# Patient Record
Sex: MALE | Race: WHITE | HISPANIC OR LATINO | Employment: OTHER | URBAN - METROPOLITAN AREA
[De-identification: names, ages, dates, MRNs, and addresses within clinical notes are randomized per-mention and may not be internally consistent; named-entity substitution may affect disease eponyms.]

---

## 2021-09-10 ENCOUNTER — HOSPITAL ENCOUNTER (OUTPATIENT)
Dept: WOUND CARE | Facility: HOSPITAL | Age: 71
Discharge: HOME | End: 2021-09-10
Attending: PREVENTIVE MEDICINE
Payer: MEDICARE

## 2021-09-10 VITALS
HEART RATE: 72 BPM | SYSTOLIC BLOOD PRESSURE: 109 MMHG | DIASTOLIC BLOOD PRESSURE: 59 MMHG | WEIGHT: 165 LBS | TEMPERATURE: 98 F

## 2021-09-10 DIAGNOSIS — T87.89 NON-HEALING AMPUTATION SITE: Primary | ICD-10-CM

## 2021-09-10 DIAGNOSIS — L89.223 PRESSURE INJURY OF LEFT THIGH, STAGE 3: ICD-10-CM

## 2021-09-10 DIAGNOSIS — Z89.612 S/P AKA (ABOVE KNEE AMPUTATION), LEFT: ICD-10-CM

## 2021-09-10 DIAGNOSIS — T81.49XA INCISIONAL ABSCESS: ICD-10-CM

## 2021-09-10 DIAGNOSIS — Z86.73 H/O: STROKE: ICD-10-CM

## 2021-09-10 PROCEDURE — 99214 OFFICE O/P EST MOD 30 MIN: CPT

## 2021-09-17 ENCOUNTER — HOSPITAL ENCOUNTER (OUTPATIENT)
Dept: WOUND CARE | Facility: HOSPITAL | Age: 71
Discharge: HOME | End: 2021-09-17
Attending: PREVENTIVE MEDICINE
Payer: MEDICARE

## 2021-09-17 VITALS
WEIGHT: 165 LBS | SYSTOLIC BLOOD PRESSURE: 128 MMHG | TEMPERATURE: 98 F | DIASTOLIC BLOOD PRESSURE: 82 MMHG | HEART RATE: 80 BPM

## 2021-09-17 DIAGNOSIS — Z89.612 S/P AKA (ABOVE KNEE AMPUTATION), LEFT: ICD-10-CM

## 2021-09-17 DIAGNOSIS — T81.49XA INCISIONAL ABSCESS: Primary | ICD-10-CM

## 2021-09-17 DIAGNOSIS — L89.223 PRESSURE INJURY OF LEFT THIGH, STAGE 3: ICD-10-CM

## 2021-09-17 DIAGNOSIS — T87.89 NON-HEALING AMPUTATION SITE: ICD-10-CM

## 2021-09-17 PROCEDURE — 99214 OFFICE O/P EST MOD 30 MIN: CPT

## 2021-09-24 ENCOUNTER — HOSPITAL ENCOUNTER (OUTPATIENT)
Dept: WOUND CARE | Facility: HOSPITAL | Age: 71
Discharge: HOME | End: 2021-09-24
Attending: PREVENTIVE MEDICINE
Payer: MEDICARE

## 2021-09-24 VITALS
TEMPERATURE: 97 F | WEIGHT: 165 LBS | DIASTOLIC BLOOD PRESSURE: 59 MMHG | HEART RATE: 80 BPM | SYSTOLIC BLOOD PRESSURE: 124 MMHG

## 2021-09-24 DIAGNOSIS — L89.223 PRESSURE INJURY OF LEFT THIGH, STAGE 3: ICD-10-CM

## 2021-09-24 DIAGNOSIS — T81.49XA INCISIONAL ABSCESS: ICD-10-CM

## 2021-09-24 DIAGNOSIS — T87.89 NON-HEALING AMPUTATION SITE: Primary | ICD-10-CM

## 2021-09-24 PROCEDURE — 87075 CULTR BACTERIA EXCEPT BLOOD: CPT | Performed by: PREVENTIVE MEDICINE

## 2021-09-24 PROCEDURE — 87070 CULTURE OTHR SPECIMN AEROBIC: CPT | Performed by: PREVENTIVE MEDICINE

## 2021-09-24 PROCEDURE — 87077 CULTURE AEROBIC IDENTIFY: CPT | Performed by: PREVENTIVE MEDICINE

## 2021-09-24 PROCEDURE — 87186 SC STD MICRODIL/AGAR DIL: CPT | Mod: 59 | Performed by: PREVENTIVE MEDICINE

## 2021-09-24 PROCEDURE — 99214 OFFICE O/P EST MOD 30 MIN: CPT

## 2021-09-28 LAB
BACTERIA SPEC AEROBE CULT: ABNORMAL

## 2021-09-30 LAB — BACTERIA SPEC ANAEROBE CULT: NORMAL

## 2021-10-01 ENCOUNTER — HOSPITAL ENCOUNTER (OUTPATIENT)
Dept: WOUND CARE | Facility: HOSPITAL | Age: 71
Discharge: HOME | End: 2021-10-01
Attending: PREVENTIVE MEDICINE
Payer: MEDICARE

## 2021-10-01 VITALS — SYSTOLIC BLOOD PRESSURE: 134 MMHG | HEART RATE: 65 BPM | DIASTOLIC BLOOD PRESSURE: 65 MMHG | TEMPERATURE: 98 F

## 2021-10-01 DIAGNOSIS — T81.49XA INCISIONAL ABSCESS: ICD-10-CM

## 2021-10-01 DIAGNOSIS — Z86.73 H/O: STROKE: ICD-10-CM

## 2021-10-01 DIAGNOSIS — L89.223 PRESSURE INJURY OF LEFT THIGH, STAGE 3: ICD-10-CM

## 2021-10-01 DIAGNOSIS — T87.89 NON-HEALING AMPUTATION SITE: Primary | ICD-10-CM

## 2021-10-01 PROCEDURE — 99214 OFFICE O/P EST MOD 30 MIN: CPT

## 2021-10-01 RX ORDER — CEPHALEXIN 500 MG/1
500 CAPSULE ORAL EVERY 12 HOURS
Qty: 20 CAPSULE | Refills: 0 | Status: SHIPPED | OUTPATIENT
Start: 2021-10-01 | End: 2021-10-11

## 2021-10-05 ENCOUNTER — EXTERNAL HOME HEALTH (OUTPATIENT)
Dept: HOME HEALTH SERVICES | Facility: HOSPITAL | Age: 71
End: 2021-10-05

## 2021-10-08 ENCOUNTER — HOSPITAL ENCOUNTER (OUTPATIENT)
Dept: WOUND CARE | Facility: HOSPITAL | Age: 71
Discharge: HOME | End: 2021-10-08
Attending: PREVENTIVE MEDICINE
Payer: MEDICARE

## 2021-10-08 VITALS
DIASTOLIC BLOOD PRESSURE: 59 MMHG | WEIGHT: 165 LBS | TEMPERATURE: 97 F | HEART RATE: 65 BPM | SYSTOLIC BLOOD PRESSURE: 126 MMHG

## 2021-10-08 DIAGNOSIS — L89.223 PRESSURE INJURY OF LEFT THIGH, STAGE 3: ICD-10-CM

## 2021-10-08 DIAGNOSIS — T87.89 NON-HEALING AMPUTATION SITE: ICD-10-CM

## 2021-10-08 DIAGNOSIS — T81.49XA INCISIONAL ABSCESS: Primary | ICD-10-CM

## 2021-10-08 PROCEDURE — 99214 OFFICE O/P EST MOD 30 MIN: CPT

## 2021-10-15 ENCOUNTER — HOSPITAL ENCOUNTER (OUTPATIENT)
Dept: WOUND CARE | Facility: HOSPITAL | Age: 71
Discharge: HOME | End: 2021-10-15
Attending: PREVENTIVE MEDICINE
Payer: MEDICARE

## 2021-10-15 VITALS — DIASTOLIC BLOOD PRESSURE: 62 MMHG | TEMPERATURE: 97 F | HEART RATE: 65 BPM | SYSTOLIC BLOOD PRESSURE: 137 MMHG

## 2021-10-15 DIAGNOSIS — T81.49XA INCISIONAL ABSCESS: ICD-10-CM

## 2021-10-15 DIAGNOSIS — T87.89 NON-HEALING AMPUTATION SITE: Primary | ICD-10-CM

## 2021-10-15 PROCEDURE — 99214 OFFICE O/P EST MOD 30 MIN: CPT

## 2021-10-22 ENCOUNTER — HOSPITAL ENCOUNTER (OUTPATIENT)
Dept: WOUND CARE | Facility: HOSPITAL | Age: 71
Discharge: HOME | End: 2021-10-22
Attending: PREVENTIVE MEDICINE
Payer: MEDICARE

## 2021-10-22 VITALS
DIASTOLIC BLOOD PRESSURE: 52 MMHG | SYSTOLIC BLOOD PRESSURE: 114 MMHG | TEMPERATURE: 98 F | HEIGHT: 63 IN | BODY MASS INDEX: 29.23 KG/M2 | WEIGHT: 165 LBS | HEART RATE: 63 BPM

## 2021-10-22 DIAGNOSIS — Z89.612 S/P AKA (ABOVE KNEE AMPUTATION), LEFT: ICD-10-CM

## 2021-10-22 DIAGNOSIS — L89.223 PRESSURE INJURY OF LEFT THIGH, STAGE 3: ICD-10-CM

## 2021-10-22 DIAGNOSIS — T87.89 NON-HEALING AMPUTATION SITE: Primary | ICD-10-CM

## 2021-10-22 DIAGNOSIS — T81.49XA INCISIONAL ABSCESS: ICD-10-CM

## 2021-10-22 PROCEDURE — 99214 OFFICE O/P EST MOD 30 MIN: CPT

## 2021-10-29 ENCOUNTER — HOSPITAL ENCOUNTER (OUTPATIENT)
Dept: WOUND CARE | Facility: HOSPITAL | Age: 71
Discharge: HOME | End: 2021-10-29
Attending: PREVENTIVE MEDICINE
Payer: MEDICARE

## 2021-10-29 VITALS
HEIGHT: 63 IN | BODY MASS INDEX: 29.23 KG/M2 | DIASTOLIC BLOOD PRESSURE: 84 MMHG | WEIGHT: 165 LBS | SYSTOLIC BLOOD PRESSURE: 132 MMHG | HEART RATE: 91 BPM | TEMPERATURE: 98 F

## 2021-10-29 DIAGNOSIS — T81.49XA INCISIONAL ABSCESS: ICD-10-CM

## 2021-10-29 DIAGNOSIS — L89.223 PRESSURE INJURY OF LEFT THIGH, STAGE 3: ICD-10-CM

## 2021-10-29 DIAGNOSIS — T87.89 NON-HEALING AMPUTATION SITE: Primary | ICD-10-CM

## 2021-10-29 PROCEDURE — 99214 OFFICE O/P EST MOD 30 MIN: CPT

## 2021-11-05 ENCOUNTER — HOSPITAL ENCOUNTER (OUTPATIENT)
Dept: WOUND CARE | Facility: HOSPITAL | Age: 71
Discharge: HOME | End: 2021-11-05
Attending: PREVENTIVE MEDICINE
Payer: MEDICARE

## 2021-11-05 VITALS
BODY MASS INDEX: 29.23 KG/M2 | HEART RATE: 77 BPM | HEIGHT: 63 IN | SYSTOLIC BLOOD PRESSURE: 101 MMHG | WEIGHT: 165 LBS | DIASTOLIC BLOOD PRESSURE: 58 MMHG | TEMPERATURE: 98 F

## 2021-11-05 DIAGNOSIS — T87.89 NON-HEALING AMPUTATION SITE: Primary | ICD-10-CM

## 2021-11-05 DIAGNOSIS — T81.49XA INCISIONAL ABSCESS: ICD-10-CM

## 2021-11-05 DIAGNOSIS — Z89.612 S/P AKA (ABOVE KNEE AMPUTATION), LEFT: ICD-10-CM

## 2021-11-05 DIAGNOSIS — L89.223 PRESSURE INJURY OF LEFT THIGH, STAGE 3: ICD-10-CM

## 2021-11-05 PROCEDURE — 99214 OFFICE O/P EST MOD 30 MIN: CPT

## 2021-11-05 RX ORDER — MUPIROCIN 20 MG/G
OINTMENT TOPICAL DAILY
Qty: 30 G | Refills: 2 | Status: SHIPPED | OUTPATIENT
Start: 2021-11-05 | End: 2022-08-19 | Stop reason: ALTCHOICE

## 2021-11-05 RX ORDER — GABAPENTIN 100 MG/1
300 CAPSULE ORAL 3 TIMES DAILY
Qty: 270 CAPSULE | Refills: 3 | Status: SHIPPED | OUTPATIENT
Start: 2021-11-05 | End: 2022-03-05

## 2021-11-12 ENCOUNTER — HOSPITAL ENCOUNTER (OUTPATIENT)
Dept: WOUND CARE | Facility: HOSPITAL | Age: 71
Discharge: HOME | End: 2021-11-12
Attending: PREVENTIVE MEDICINE
Payer: MEDICARE

## 2021-11-12 VITALS
DIASTOLIC BLOOD PRESSURE: 63 MMHG | TEMPERATURE: 96 F | WEIGHT: 165 LBS | BODY MASS INDEX: 29.23 KG/M2 | SYSTOLIC BLOOD PRESSURE: 131 MMHG | HEART RATE: 65 BPM | HEIGHT: 63 IN

## 2021-11-12 DIAGNOSIS — Z89.612 S/P AKA (ABOVE KNEE AMPUTATION), LEFT: ICD-10-CM

## 2021-11-12 DIAGNOSIS — L89.223 PRESSURE INJURY OF LEFT THIGH, STAGE 3: ICD-10-CM

## 2021-11-12 DIAGNOSIS — T87.89 NON-HEALING AMPUTATION SITE: Primary | ICD-10-CM

## 2021-11-12 DIAGNOSIS — T81.49XA INCISIONAL ABSCESS: ICD-10-CM

## 2021-11-12 PROCEDURE — 99214 OFFICE O/P EST MOD 30 MIN: CPT

## 2021-11-19 ENCOUNTER — HOSPITAL ENCOUNTER (OUTPATIENT)
Dept: WOUND CARE | Facility: HOSPITAL | Age: 71
Discharge: HOME | End: 2021-11-19
Attending: PREVENTIVE MEDICINE
Payer: MEDICARE

## 2021-11-19 DIAGNOSIS — Z89.612 S/P AKA (ABOVE KNEE AMPUTATION), LEFT: ICD-10-CM

## 2021-11-19 DIAGNOSIS — L89.223 PRESSURE INJURY OF LEFT THIGH, STAGE 3: ICD-10-CM

## 2021-11-19 DIAGNOSIS — S31.109S OPEN WOUND OF ABDOMINAL WALL, SEQUELA: ICD-10-CM

## 2021-11-19 DIAGNOSIS — T87.89 NON-HEALING AMPUTATION SITE: Primary | ICD-10-CM

## 2021-11-19 PROCEDURE — 27201912 HC WOUND CARE DEBRIDEMENT SUPPLIES

## 2021-11-19 PROCEDURE — 11042 DBRDMT SUBQ TIS 1ST 20SQCM/<: CPT

## 2021-11-26 ENCOUNTER — HOSPITAL ENCOUNTER (OUTPATIENT)
Dept: WOUND CARE | Facility: HOSPITAL | Age: 71
Discharge: HOME | End: 2021-11-26
Attending: PREVENTIVE MEDICINE
Payer: MEDICARE

## 2021-11-26 VITALS
WEIGHT: 165 LBS | DIASTOLIC BLOOD PRESSURE: 78 MMHG | TEMPERATURE: 97 F | SYSTOLIC BLOOD PRESSURE: 130 MMHG | BODY MASS INDEX: 29.23 KG/M2 | HEIGHT: 63 IN | HEART RATE: 71 BPM

## 2021-11-26 DIAGNOSIS — T87.89 NON-HEALING AMPUTATION SITE: Primary | ICD-10-CM

## 2021-11-26 DIAGNOSIS — Z89.612 S/P AKA (ABOVE KNEE AMPUTATION), LEFT: ICD-10-CM

## 2021-11-26 DIAGNOSIS — L89.223 PRESSURE INJURY OF LEFT THIGH, STAGE 3: ICD-10-CM

## 2021-11-26 DIAGNOSIS — S31.109S OPEN WOUND OF ABDOMINAL WALL, SEQUELA: ICD-10-CM

## 2021-11-26 PROCEDURE — 27201912 HC WOUND CARE DEBRIDEMENT SUPPLIES

## 2021-11-26 PROCEDURE — 11042 DBRDMT SUBQ TIS 1ST 20SQCM/<: CPT

## 2021-12-03 ENCOUNTER — HOSPITAL ENCOUNTER (OUTPATIENT)
Dept: WOUND CARE | Facility: HOSPITAL | Age: 71
Discharge: HOME | End: 2021-12-03
Attending: PREVENTIVE MEDICINE
Payer: MEDICARE

## 2021-12-03 VITALS
SYSTOLIC BLOOD PRESSURE: 138 MMHG | DIASTOLIC BLOOD PRESSURE: 60 MMHG | BODY MASS INDEX: 29.23 KG/M2 | TEMPERATURE: 98 F | HEART RATE: 71 BPM | WEIGHT: 165 LBS | HEIGHT: 63 IN

## 2021-12-03 DIAGNOSIS — L92.9 HYPERGRANULATION: ICD-10-CM

## 2021-12-03 DIAGNOSIS — L89.223 PRESSURE INJURY OF LEFT THIGH, STAGE 3: ICD-10-CM

## 2021-12-03 DIAGNOSIS — T81.49XA INCISIONAL ABSCESS: Primary | ICD-10-CM

## 2021-12-03 DIAGNOSIS — Z89.612 S/P AKA (ABOVE KNEE AMPUTATION), LEFT: ICD-10-CM

## 2021-12-03 DIAGNOSIS — T87.89 NON-HEALING AMPUTATION SITE: ICD-10-CM

## 2021-12-03 PROCEDURE — 99214 OFFICE O/P EST MOD 30 MIN: CPT

## 2021-12-06 ENCOUNTER — EXTERNAL HOME HEALTH (OUTPATIENT)
Dept: HOME HEALTH SERVICES | Facility: HOSPITAL | Age: 71
End: 2021-12-06
Payer: MEDICARE

## 2021-12-10 ENCOUNTER — HOSPITAL ENCOUNTER (OUTPATIENT)
Dept: WOUND CARE | Facility: HOSPITAL | Age: 71
Discharge: HOME | End: 2021-12-10
Attending: PREVENTIVE MEDICINE
Payer: MEDICARE

## 2021-12-10 VITALS
SYSTOLIC BLOOD PRESSURE: 130 MMHG | DIASTOLIC BLOOD PRESSURE: 61 MMHG | HEIGHT: 63 IN | HEART RATE: 68 BPM | WEIGHT: 165 LBS | BODY MASS INDEX: 29.23 KG/M2

## 2021-12-10 DIAGNOSIS — S31.109S OPEN WOUND OF ABDOMINAL WALL, SEQUELA: ICD-10-CM

## 2021-12-10 DIAGNOSIS — T87.89 NON-HEALING AMPUTATION SITE: Primary | ICD-10-CM

## 2021-12-10 DIAGNOSIS — L89.223 PRESSURE INJURY OF LEFT THIGH, STAGE 3: ICD-10-CM

## 2021-12-10 PROCEDURE — 87070 CULTURE OTHR SPECIMN AEROBIC: CPT | Performed by: PREVENTIVE MEDICINE

## 2021-12-10 PROCEDURE — 11042 DBRDMT SUBQ TIS 1ST 20SQCM/<: CPT

## 2021-12-10 PROCEDURE — 27201912 HC WOUND CARE DEBRIDEMENT SUPPLIES

## 2021-12-10 PROCEDURE — 87077 CULTURE AEROBIC IDENTIFY: CPT | Mod: 59 | Performed by: PREVENTIVE MEDICINE

## 2021-12-10 PROCEDURE — 87075 CULTR BACTERIA EXCEPT BLOOD: CPT | Performed by: PREVENTIVE MEDICINE

## 2021-12-10 PROCEDURE — 87186 SC STD MICRODIL/AGAR DIL: CPT | Mod: 59 | Performed by: PREVENTIVE MEDICINE

## 2021-12-13 LAB
BACTERIA SPEC AEROBE CULT: ABNORMAL
BACTERIA SPEC AEROBE CULT: ABNORMAL

## 2021-12-15 LAB — BACTERIA SPEC ANAEROBE CULT: NORMAL

## 2021-12-17 ENCOUNTER — HOSPITAL ENCOUNTER (OUTPATIENT)
Dept: WOUND CARE | Facility: HOSPITAL | Age: 71
Discharge: HOME | End: 2021-12-17
Attending: PREVENTIVE MEDICINE
Payer: MEDICARE

## 2021-12-17 VITALS
TEMPERATURE: 97 F | HEART RATE: 63 BPM | HEIGHT: 63 IN | SYSTOLIC BLOOD PRESSURE: 137 MMHG | DIASTOLIC BLOOD PRESSURE: 84 MMHG | WEIGHT: 165 LBS | BODY MASS INDEX: 29.23 KG/M2

## 2021-12-17 DIAGNOSIS — L89.223 PRESSURE INJURY OF LEFT THIGH, STAGE 3: ICD-10-CM

## 2021-12-17 DIAGNOSIS — T87.89 NON-HEALING AMPUTATION SITE: Primary | ICD-10-CM

## 2021-12-17 DIAGNOSIS — S31.109S OPEN WOUND OF ABDOMINAL WALL, SEQUELA: ICD-10-CM

## 2021-12-17 PROCEDURE — 99214 OFFICE O/P EST MOD 30 MIN: CPT

## 2021-12-17 RX ORDER — LEVOFLOXACIN 500 MG/1
500 TABLET, FILM COATED ORAL DAILY
Qty: 10 TABLET | Refills: 0 | Status: SHIPPED | OUTPATIENT
Start: 2021-12-17 | End: 2021-12-17 | Stop reason: CLARIF

## 2021-12-31 ENCOUNTER — HOSPITAL ENCOUNTER (INPATIENT)
Facility: HOSPITAL | Age: 71
LOS: 5 days | Discharge: HOME HEALTH | DRG: 065 | End: 2022-01-05
Attending: PSYCHIATRY & NEUROLOGY | Admitting: PSYCHIATRY & NEUROLOGY
Payer: MEDICARE

## 2021-12-31 ENCOUNTER — HOSPITAL ENCOUNTER (EMERGENCY)
Facility: HOSPITAL | Age: 71
Discharge: ANOTHER HOSPITAL | End: 2021-12-31
Attending: EMERGENCY MEDICINE
Payer: MEDICARE

## 2021-12-31 VITALS
RESPIRATION RATE: 13 BRPM | SYSTOLIC BLOOD PRESSURE: 155 MMHG | HEART RATE: 78 BPM | DIASTOLIC BLOOD PRESSURE: 105 MMHG | TEMPERATURE: 99 F | BODY MASS INDEX: 29.23 KG/M2 | WEIGHT: 165 LBS | HEIGHT: 63 IN | OXYGEN SATURATION: 100 %

## 2021-12-31 DIAGNOSIS — I63.9 ISCHEMIC STROKE: ICD-10-CM

## 2021-12-31 DIAGNOSIS — S06.5XAA SUBDURAL HEMATOMA: Primary | ICD-10-CM

## 2021-12-31 DIAGNOSIS — G40.409 TONIC CLONIC SEIZURES: ICD-10-CM

## 2021-12-31 DIAGNOSIS — I62.03 ACUTE ON CHRONIC INTRACRANIAL SUBDURAL HEMATOMA: ICD-10-CM

## 2021-12-31 DIAGNOSIS — I62.01 ACUTE ON CHRONIC INTRACRANIAL SUBDURAL HEMATOMA: ICD-10-CM

## 2021-12-31 DIAGNOSIS — I69.30 HISTORY OF CVA WITH RESIDUAL DEFICIT: ICD-10-CM

## 2021-12-31 DIAGNOSIS — R56.9 SEIZURE: ICD-10-CM

## 2021-12-31 PROBLEM — E78.5 HYPERLIPIDEMIA: Status: ACTIVE | Noted: 2021-12-31

## 2021-12-31 PROBLEM — I10 ESSENTIAL HYPERTENSION: Status: ACTIVE | Noted: 2021-12-31

## 2021-12-31 PROBLEM — I50.9 CHF (CONGESTIVE HEART FAILURE): Status: ACTIVE | Noted: 2021-12-31

## 2021-12-31 PROBLEM — I73.9 PERIPHERAL VASCULAR DISEASE: Status: ACTIVE | Noted: 2021-12-31

## 2021-12-31 PROBLEM — R79.89 ELEVATED LACTIC ACID LEVEL: Status: ACTIVE | Noted: 2021-12-31

## 2021-12-31 PROBLEM — E03.9 HYPOTHYROIDISM: Status: ACTIVE | Noted: 2021-12-31

## 2021-12-31 PROBLEM — Z89.612 S/P AKA (ABOVE KNEE AMPUTATION) UNILATERAL, LEFT: Status: ACTIVE | Noted: 2021-12-31

## 2021-12-31 LAB
ABO + RH BLD: NORMAL
ALBUMIN SERPL BCP-MCNC: 3.7 G/DL (ref 3.5–5.2)
ALLENS TEST: ABNORMAL
ALP SERPL-CCNC: 91 U/L (ref 55–135)
ALT SERPL W/O P-5'-P-CCNC: 16 U/L (ref 10–44)
ANION GAP SERPL CALC-SCNC: 13 MMOL/L (ref 8–16)
APTT BLDCRRT: 29.3 SEC (ref 21–32)
AST SERPL-CCNC: 15 U/L (ref 10–40)
BACTERIA #/AREA URNS AUTO: NORMAL /HPF
BASOPHILS # BLD AUTO: 0.12 K/UL (ref 0–0.2)
BASOPHILS NFR BLD: 1 % (ref 0–1.9)
BILIRUB SERPL-MCNC: 0.2 MG/DL (ref 0.1–1)
BILIRUB UR QL STRIP: NEGATIVE
BLD GP AB SCN CELLS X3 SERPL QL: NORMAL
BUN SERPL-MCNC: 19 MG/DL (ref 8–23)
CALCIUM SERPL-MCNC: 9.8 MG/DL (ref 8.7–10.5)
CHLORIDE SERPL-SCNC: 102 MMOL/L (ref 95–110)
CHOLEST SERPL-MCNC: 173 MG/DL (ref 120–199)
CHOLEST/HDLC SERPL: 5.1 {RATIO} (ref 2–5)
CK SERPL-CCNC: 146 U/L (ref 20–200)
CLARITY UR REFRACT.AUTO: CLEAR
CO2 SERPL-SCNC: 24 MMOL/L (ref 23–29)
COLOR UR AUTO: YELLOW
CREAT SERPL-MCNC: 1.6 MG/DL (ref 0.5–1.4)
CTP QC/QA: YES
DELSYS: ABNORMAL
DIFFERENTIAL METHOD: ABNORMAL
DIGOXIN SERPL-MCNC: 0.6 NG/ML (ref 0.8–2)
EOSINOPHIL # BLD AUTO: 0.4 K/UL (ref 0–0.5)
EOSINOPHIL NFR BLD: 3.3 % (ref 0–8)
ERYTHROCYTE [DISTWIDTH] IN BLOOD BY AUTOMATED COUNT: 16 % (ref 11.5–14.5)
EST. GFR  (AFRICAN AMERICAN): 50 ML/MIN/1.73 M^2
EST. GFR  (NON AFRICAN AMERICAN): 43 ML/MIN/1.73 M^2
ESTIMATED AVG GLUCOSE: 157 MG/DL (ref 68–131)
GLUCOSE SERPL-MCNC: 186 MG/DL (ref 70–110)
GLUCOSE UR QL STRIP: NEGATIVE
HBA1C MFR BLD: 7.1 % (ref 4–5.6)
HCO3 UR-SCNC: 29.4 MMOL/L (ref 24–28)
HCT VFR BLD AUTO: 35.9 % (ref 40–54)
HCT VFR BLD CALC: 34 %PCV (ref 36–54)
HDLC SERPL-MCNC: 34 MG/DL (ref 40–75)
HDLC SERPL: 19.7 % (ref 20–50)
HGB BLD-MCNC: 11.2 G/DL (ref 14–18)
HGB UR QL STRIP: NEGATIVE
HYALINE CASTS UR QL AUTO: 0 /LPF
IMM GRANULOCYTES # BLD AUTO: 0.13 K/UL (ref 0–0.04)
IMM GRANULOCYTES NFR BLD AUTO: 1.1 % (ref 0–0.5)
INFLUENZA A, MOLECULAR: NEGATIVE
INFLUENZA B, MOLECULAR: NEGATIVE
INR PPP: 1.1 (ref 0.8–1.2)
KETONES UR QL STRIP: NEGATIVE
LACTATE SERPL-SCNC: 0.9 MMOL/L (ref 0.5–2.2)
LACTATE SERPL-SCNC: 4.2 MMOL/L (ref 0.5–2.2)
LDLC SERPL CALC-MCNC: 96 MG/DL (ref 63–159)
LEUKOCYTE ESTERASE UR QL STRIP: NEGATIVE
LYMPHOCYTES # BLD AUTO: 2.1 K/UL (ref 1–4.8)
LYMPHOCYTES NFR BLD: 17.1 % (ref 18–48)
MCH RBC QN AUTO: 30.4 PG (ref 27–31)
MCHC RBC AUTO-ENTMCNC: 31.2 G/DL (ref 32–36)
MCV RBC AUTO: 98 FL (ref 82–98)
MICROSCOPIC COMMENT: NORMAL
MONOCYTES # BLD AUTO: 0.8 K/UL (ref 0.3–1)
MONOCYTES NFR BLD: 6.3 % (ref 4–15)
NEUTROPHILS # BLD AUTO: 8.7 K/UL (ref 1.8–7.7)
NEUTROPHILS NFR BLD: 71.2 % (ref 38–73)
NITRITE UR QL STRIP: NEGATIVE
NONHDLC SERPL-MCNC: 139 MG/DL
NRBC BLD-RTO: 0 /100 WBC
PCO2 BLDA: 42.9 MMHG (ref 35–45)
PH SMN: 7.44 [PH] (ref 7.35–7.45)
PH UR STRIP: 5 [PH] (ref 5–8)
PLATELET # BLD AUTO: 338 K/UL (ref 150–450)
PMV BLD AUTO: 9.2 FL (ref 9.2–12.9)
PO2 BLDA: 69 MMHG (ref 80–100)
POC BE: 5 MMOL/L
POC IONIZED CALCIUM: 1.28 MMOL/L (ref 1.06–1.42)
POC SATURATED O2: 94 % (ref 95–100)
POC TCO2: 31 MMOL/L (ref 23–27)
POCT GLUCOSE: 168 MG/DL (ref 70–110)
POCT GLUCOSE: 87 MG/DL (ref 70–110)
POTASSIUM BLD-SCNC: 3.8 MMOL/L (ref 3.5–5.1)
POTASSIUM SERPL-SCNC: 4.8 MMOL/L (ref 3.5–5.1)
PROCALCITONIN SERPL IA-MCNC: 0.02 NG/ML
PROT SERPL-MCNC: 7.6 G/DL (ref 6–8.4)
PROT UR QL STRIP: ABNORMAL
PROTHROMBIN TIME: 11.1 SEC (ref 9–12.5)
RBC # BLD AUTO: 3.68 M/UL (ref 4.6–6.2)
RBC #/AREA URNS AUTO: 1 /HPF (ref 0–4)
SAMPLE: ABNORMAL
SARS-COV-2 RDRP RESP QL NAA+PROBE: NEGATIVE
SITE: ABNORMAL
SODIUM BLD-SCNC: 141 MMOL/L (ref 136–145)
SODIUM SERPL-SCNC: 139 MMOL/L (ref 136–145)
SP GR UR STRIP: 1.02 (ref 1–1.03)
SPECIMEN SOURCE: NORMAL
SQUAMOUS #/AREA URNS AUTO: 3 /HPF
T4 FREE SERPL-MCNC: 0.48 NG/DL (ref 0.71–1.51)
TRIGL SERPL-MCNC: 215 MG/DL (ref 30–150)
TROPONIN I SERPL DL<=0.01 NG/ML-MCNC: <0.006 NG/ML (ref 0–0.03)
TSH SERPL DL<=0.005 MIU/L-ACNC: 54.59 UIU/ML (ref 0.4–4)
URN SPEC COLLECT METH UR: ABNORMAL
WBC # BLD AUTO: 12.22 K/UL (ref 3.9–12.7)
WBC #/AREA URNS AUTO: 3 /HPF (ref 0–5)

## 2021-12-31 PROCEDURE — 84484 ASSAY OF TROPONIN QUANT: CPT | Performed by: EMERGENCY MEDICINE

## 2021-12-31 PROCEDURE — 83036 HEMOGLOBIN GLYCOSYLATED A1C: CPT

## 2021-12-31 PROCEDURE — 83605 ASSAY OF LACTIC ACID: CPT | Mod: 91 | Performed by: PSYCHIATRY & NEUROLOGY

## 2021-12-31 PROCEDURE — U0002 COVID-19 LAB TEST NON-CDC: HCPCS | Performed by: EMERGENCY MEDICINE

## 2021-12-31 PROCEDURE — 20000000 HC ICU ROOM

## 2021-12-31 PROCEDURE — 63600175 PHARM REV CODE 636 W HCPCS

## 2021-12-31 PROCEDURE — 80053 COMPREHEN METABOLIC PANEL: CPT | Performed by: EMERGENCY MEDICINE

## 2021-12-31 PROCEDURE — 85025 COMPLETE CBC W/AUTO DIFF WBC: CPT | Performed by: EMERGENCY MEDICINE

## 2021-12-31 PROCEDURE — 27000221 HC OXYGEN, UP TO 24 HOURS

## 2021-12-31 PROCEDURE — 80162 ASSAY OF DIGOXIN TOTAL: CPT

## 2021-12-31 PROCEDURE — 82962 GLUCOSE BLOOD TEST: CPT

## 2021-12-31 PROCEDURE — 87040 BLOOD CULTURE FOR BACTERIA: CPT | Performed by: EMERGENCY MEDICINE

## 2021-12-31 PROCEDURE — 96365 THER/PROPH/DIAG IV INF INIT: CPT

## 2021-12-31 PROCEDURE — 99291 CRITICAL CARE FIRST HOUR: CPT | Mod: 25

## 2021-12-31 PROCEDURE — 83605 ASSAY OF LACTIC ACID: CPT | Performed by: EMERGENCY MEDICINE

## 2021-12-31 PROCEDURE — 84439 ASSAY OF FREE THYROXINE: CPT

## 2021-12-31 PROCEDURE — 63600175 PHARM REV CODE 636 W HCPCS: Performed by: EMERGENCY MEDICINE

## 2021-12-31 PROCEDURE — 84443 ASSAY THYROID STIM HORMONE: CPT

## 2021-12-31 PROCEDURE — 85730 THROMBOPLASTIN TIME PARTIAL: CPT | Performed by: EMERGENCY MEDICINE

## 2021-12-31 PROCEDURE — 94761 N-INVAS EAR/PLS OXIMETRY MLT: CPT

## 2021-12-31 PROCEDURE — 95720 PR EEG, W/VIDEO, CONT RECORD, I&R, >12<26 HRS: ICD-10-PCS | Mod: ,,, | Performed by: PSYCHIATRY & NEUROLOGY

## 2021-12-31 PROCEDURE — 99291 CRITICAL CARE FIRST HOUR: CPT | Mod: ,,,

## 2021-12-31 PROCEDURE — 25000003 PHARM REV CODE 250

## 2021-12-31 PROCEDURE — 36415 COLL VENOUS BLD VENIPUNCTURE: CPT

## 2021-12-31 PROCEDURE — 87502 INFLUENZA DNA AMP PROBE: CPT | Performed by: EMERGENCY MEDICINE

## 2021-12-31 PROCEDURE — 84145 PROCALCITONIN (PCT): CPT | Performed by: EMERGENCY MEDICINE

## 2021-12-31 PROCEDURE — 80061 LIPID PANEL: CPT

## 2021-12-31 PROCEDURE — 82550 ASSAY OF CK (CPK): CPT | Performed by: EMERGENCY MEDICINE

## 2021-12-31 PROCEDURE — 81001 URINALYSIS AUTO W/SCOPE: CPT

## 2021-12-31 PROCEDURE — 85610 PROTHROMBIN TIME: CPT | Performed by: EMERGENCY MEDICINE

## 2021-12-31 PROCEDURE — 86901 BLOOD TYPING SEROLOGIC RH(D): CPT

## 2021-12-31 PROCEDURE — 25000003 PHARM REV CODE 250: Performed by: EMERGENCY MEDICINE

## 2021-12-31 PROCEDURE — 95720 EEG PHY/QHP EA INCR W/VEEG: CPT | Mod: ,,, | Performed by: PSYCHIATRY & NEUROLOGY

## 2021-12-31 PROCEDURE — 99291 PR CRITICAL CARE, E/M 30-74 MINUTES: ICD-10-PCS | Mod: ,,,

## 2021-12-31 PROCEDURE — 99900035 HC TECH TIME PER 15 MIN (STAT)

## 2021-12-31 RX ORDER — GABAPENTIN 250 MG/5ML
300 SOLUTION ORAL EVERY 8 HOURS
Status: DISCONTINUED | OUTPATIENT
Start: 2021-12-31 | End: 2021-12-31

## 2021-12-31 RX ORDER — CARVEDILOL 12.5 MG/1
12.5 TABLET ORAL 2 TIMES DAILY
Status: DISCONTINUED | OUTPATIENT
Start: 2021-12-31 | End: 2022-01-02

## 2021-12-31 RX ORDER — OLANZAPINE 2.5 MG/1
2.5 TABLET ORAL DAILY
Status: DISCONTINUED | OUTPATIENT
Start: 2022-01-01 | End: 2022-01-02

## 2021-12-31 RX ORDER — IBUPROFEN 100 MG/5ML
1000 SUSPENSION, ORAL (FINAL DOSE FORM) ORAL DAILY
COMMUNITY

## 2021-12-31 RX ORDER — POLYETHYLENE GLYCOL 3350 17 G/17G
17 POWDER, FOR SOLUTION ORAL DAILY
Status: DISCONTINUED | OUTPATIENT
Start: 2022-01-01 | End: 2022-01-02

## 2021-12-31 RX ORDER — AMOXICILLIN 250 MG
1 CAPSULE ORAL DAILY
Status: DISCONTINUED | OUTPATIENT
Start: 2021-12-31 | End: 2021-12-31

## 2021-12-31 RX ORDER — SODIUM CHLORIDE 0.9 % (FLUSH) 0.9 %
10 SYRINGE (ML) INJECTION
Status: DISCONTINUED | OUTPATIENT
Start: 2021-12-31 | End: 2022-01-05 | Stop reason: HOSPADM

## 2021-12-31 RX ORDER — LEVOTHYROXINE SODIUM 25 UG/1
25 TABLET ORAL
Status: DISCONTINUED | OUTPATIENT
Start: 2022-01-01 | End: 2021-12-31

## 2021-12-31 RX ORDER — LEVOTHYROXINE SODIUM 25 UG/1
25 TABLET ORAL
Status: DISCONTINUED | OUTPATIENT
Start: 2022-01-01 | End: 2022-01-02

## 2021-12-31 RX ORDER — CARVEDILOL 12.5 MG/1
12.5 TABLET ORAL 2 TIMES DAILY
Status: DISCONTINUED | OUTPATIENT
Start: 2021-12-31 | End: 2021-12-31

## 2021-12-31 RX ORDER — ACETAMINOPHEN 500 MG
1000 TABLET ORAL 2 TIMES DAILY PRN
COMMUNITY

## 2021-12-31 RX ORDER — UBIDECARENONE 75 MG
500 CAPSULE ORAL DAILY
COMMUNITY

## 2021-12-31 RX ORDER — LABETALOL HCL 20 MG/4 ML
10 SYRINGE (ML) INTRAVENOUS EVERY 6 HOURS PRN
Status: DISCONTINUED | OUTPATIENT
Start: 2021-12-31 | End: 2022-01-05 | Stop reason: HOSPADM

## 2021-12-31 RX ORDER — OLANZAPINE 2.5 MG/1
2.5 TABLET ORAL DAILY
COMMUNITY
Start: 2021-09-17

## 2021-12-31 RX ORDER — AMOXICILLIN 250 MG
1 CAPSULE ORAL DAILY PRN
Status: DISCONTINUED | OUTPATIENT
Start: 2021-12-31 | End: 2021-12-31

## 2021-12-31 RX ORDER — ATORVASTATIN CALCIUM 20 MG/1
20 TABLET, FILM COATED ORAL NIGHTLY
COMMUNITY
Start: 2021-09-17

## 2021-12-31 RX ORDER — ATORVASTATIN CALCIUM 20 MG/1
20 TABLET, FILM COATED ORAL DAILY
Status: DISCONTINUED | OUTPATIENT
Start: 2021-12-31 | End: 2021-12-31

## 2021-12-31 RX ORDER — OLANZAPINE 2.5 MG/1
2.5 TABLET ORAL DAILY
Status: DISCONTINUED | OUTPATIENT
Start: 2021-12-31 | End: 2021-12-31

## 2021-12-31 RX ORDER — CARVEDILOL 12.5 MG/1
12.5 TABLET ORAL 2 TIMES DAILY
Status: ON HOLD | COMMUNITY
Start: 2021-09-17 | End: 2022-01-05 | Stop reason: HOSPADM

## 2021-12-31 RX ORDER — DIGOXIN 125 MCG
0.12 TABLET ORAL DAILY
Status: DISCONTINUED | OUTPATIENT
Start: 2021-12-31 | End: 2021-12-31

## 2021-12-31 RX ORDER — ACETAMINOPHEN 500 MG
5000 TABLET ORAL WEEKLY
COMMUNITY

## 2021-12-31 RX ORDER — DULOXETIN HYDROCHLORIDE 30 MG/1
60 CAPSULE, DELAYED RELEASE ORAL 2 TIMES DAILY
Status: DISCONTINUED | OUTPATIENT
Start: 2021-12-31 | End: 2022-01-05 | Stop reason: HOSPADM

## 2021-12-31 RX ORDER — DIGOXIN 125 MCG
0.12 TABLET ORAL DAILY
Status: DISCONTINUED | OUTPATIENT
Start: 2022-01-01 | End: 2022-01-02

## 2021-12-31 RX ORDER — BISACODYL 10 MG
10 SUPPOSITORY, RECTAL RECTAL ONCE
Status: COMPLETED | OUTPATIENT
Start: 2021-12-31 | End: 2021-12-31

## 2021-12-31 RX ORDER — SILODOSIN 4 MG/1
4 CAPSULE ORAL DAILY
Status: DISCONTINUED | OUTPATIENT
Start: 2021-12-31 | End: 2021-12-31

## 2021-12-31 RX ORDER — DULOXETIN HYDROCHLORIDE 60 MG/1
60 CAPSULE, DELAYED RELEASE ORAL 2 TIMES DAILY
COMMUNITY
Start: 2021-09-17

## 2021-12-31 RX ORDER — LEVOTHYROXINE SODIUM 25 UG/1
25 TABLET ORAL DAILY
COMMUNITY
Start: 2021-09-17

## 2021-12-31 RX ORDER — ATORVASTATIN CALCIUM 20 MG/1
20 TABLET, FILM COATED ORAL DAILY
Status: DISCONTINUED | OUTPATIENT
Start: 2022-01-01 | End: 2022-01-02

## 2021-12-31 RX ORDER — INSULIN ASPART 100 [IU]/ML
1-10 INJECTION, SOLUTION INTRAVENOUS; SUBCUTANEOUS EVERY 6 HOURS PRN
Status: DISCONTINUED | OUTPATIENT
Start: 2021-12-31 | End: 2022-01-03

## 2021-12-31 RX ORDER — DIGOXIN 125 MCG
0.12 TABLET ORAL DAILY
COMMUNITY
Start: 2021-09-02

## 2021-12-31 RX ORDER — SILODOSIN 4 MG/1
4 CAPSULE ORAL DAILY
Status: DISCONTINUED | OUTPATIENT
Start: 2022-01-01 | End: 2022-01-02

## 2021-12-31 RX ORDER — HYDRALAZINE HYDROCHLORIDE 20 MG/ML
10 INJECTION INTRAMUSCULAR; INTRAVENOUS EVERY 6 HOURS PRN
Status: DISCONTINUED | OUTPATIENT
Start: 2021-12-31 | End: 2022-01-05 | Stop reason: HOSPADM

## 2021-12-31 RX ORDER — POLYETHYLENE GLYCOL 3350 17 G/17G
17 POWDER, FOR SOLUTION ORAL DAILY
Status: DISCONTINUED | OUTPATIENT
Start: 2022-01-01 | End: 2021-12-31

## 2021-12-31 RX ORDER — TAMSULOSIN HYDROCHLORIDE 0.4 MG/1
1 CAPSULE ORAL NIGHTLY
COMMUNITY
Start: 2021-09-17

## 2021-12-31 RX ORDER — LEVETIRACETAM 500 MG/5ML
1000 INJECTION, SOLUTION, CONCENTRATE INTRAVENOUS EVERY 12 HOURS
Status: DISCONTINUED | OUTPATIENT
Start: 2021-12-31 | End: 2022-01-01

## 2021-12-31 RX ORDER — AMOXICILLIN 250 MG
1 CAPSULE ORAL DAILY
Status: DISCONTINUED | OUTPATIENT
Start: 2022-01-01 | End: 2022-01-01

## 2021-12-31 RX ORDER — BISACODYL 5 MG
5 TABLET, DELAYED RELEASE (ENTERIC COATED) ORAL DAILY PRN
COMMUNITY

## 2021-12-31 RX ORDER — SODIUM CHLORIDE 9 MG/ML
INJECTION, SOLUTION INTRAVENOUS CONTINUOUS
Status: DISCONTINUED | OUTPATIENT
Start: 2021-12-31 | End: 2022-01-01

## 2021-12-31 RX ORDER — LABETALOL HCL 20 MG/4 ML
10 SYRINGE (ML) INTRAVENOUS EVERY 6 HOURS PRN
Status: DISCONTINUED | OUTPATIENT
Start: 2021-12-31 | End: 2021-12-31

## 2021-12-31 RX ORDER — GABAPENTIN 250 MG/5ML
300 SOLUTION ORAL EVERY 8 HOURS
Status: DISCONTINUED | OUTPATIENT
Start: 2021-12-31 | End: 2022-01-02

## 2021-12-31 RX ORDER — GLUCAGON 1 MG
1 KIT INJECTION
Status: DISCONTINUED | OUTPATIENT
Start: 2021-12-31 | End: 2022-01-05 | Stop reason: HOSPADM

## 2021-12-31 RX ADMIN — SODIUM CHLORIDE: 0.9 INJECTION, SOLUTION INTRAVENOUS at 04:12

## 2021-12-31 RX ADMIN — HYDRALAZINE HYDROCHLORIDE 10 MG: 20 INJECTION INTRAMUSCULAR; INTRAVENOUS at 05:12

## 2021-12-31 RX ADMIN — CARVEDILOL 12.5 MG: 12.5 TABLET, FILM COATED ORAL at 09:12

## 2021-12-31 RX ADMIN — LEVETIRACETAM 1000 MG: 500 INJECTION, SOLUTION INTRAVENOUS at 09:12

## 2021-12-31 RX ADMIN — BISACODYL 10 MG: 10 SUPPOSITORY RECTAL at 06:12

## 2021-12-31 RX ADMIN — GABAPENTIN 300 MG: 250 SOLUTION ORAL at 09:12

## 2021-12-31 RX ADMIN — LEVETIRACETAM 3000 MG: 100 INJECTION, SOLUTION INTRAVENOUS at 10:12

## 2021-12-31 RX ADMIN — DULOXETINE 60 MG: 60 CAPSULE, DELAYED RELEASE ORAL at 09:12

## 2021-12-31 NOTE — H&P
[facility]  Neurocritical Care  History & Physical    Admit Date: 12/31/2021  Service Date: 12/31/2021  Length of Stay: 0    Subjective:     Chief Complaint: Tonic clonic seizures    History of Present Illness: Mr. Blair is a 70 yo M with PMHx of PVD, CHF, and recent L AKA who presents to [facility] for new onset seizures and an AoC SDH. He originally presented to to the ED at [facility] via EMS with the complaint of seizure. Per his wife, the patient was in his normal state health yesterday and this morning.  She heard the bed shaking and found her  kicking his right leg and shaking his right arm in a manner most consistent with a tonic-clonic seizure approximately 1 hour prior to arrival.  Patient's wife states that she called EMS and upon EMS arrival patient was awake but postictal.  He was then being transported to the ambulance where he reportedly had another seizure and was given 2.5 mg of Versed.  Per chart review, the patient's wife states he is currently receiving antibiotics via a PICC for infected left AKA. She also denied any history of seizures or recent illness. Of note, the patient had a stroke approximately 10 years ago  for which he has residual deficit on the left side of his body and takes daily ASA. He was loaded with 3 g keppra prior to transfer to [facility].    He is being admitted to [facility] for hourly neuromonitoring and a higher level of care.       Past Medical History:   Diagnosis Date    Congestive heart failure     Peripheral vascular disease     S/P AKA (above knee amputation)      Past Surgical History:   Procedure Laterality Date    AMPUTATION      HIP SURGERY        Current Facility-Administered Medications on File Prior to Encounter   Medication Dose Route Frequency Provider Last Rate Last Admin    [COMPLETED] levETIRAcetam (KEPPRA) 3,000 mg in dextrose 5 % 250 mL IVPB  3,000 mg Intravenous ED 1 Time Pa Zelaya MD   Stopped at 12/31/21 3432     Current Outpatient  Medications on File Prior to Encounter   Medication Sig Dispense Refill    acetaminophen (TYLENOL) 500 MG tablet Take 1,000 mg by mouth 2 (two) times daily as needed for Pain.      ascorbic acid, vitamin C, (VITAMIN C) 1000 MG tablet Take 1,000 mg by mouth once daily.      atorvastatin (LIPITOR) 20 MG tablet Take 20 mg by mouth nightly.      bisacodyL (DULCOLAX) 5 mg EC tablet Take 5 mg by mouth daily as needed for Constipation.      carvediloL (COREG) 12.5 MG tablet Take 12.5 mg by mouth 2 (two) times daily.      cholecalciferol, vitamin D3, 125 mcg (5,000 unit) Tab Take 5,000 Units by mouth once a week.      cyanocobalamin 500 MCG tablet Take 500 mcg by mouth once daily.      digoxin (LANOXIN) 125 mcg tablet Take 0.125 mg by mouth once daily.      DULoxetine (CYMBALTA) 60 MG capsule Take 60 mg by mouth 2 (two) times daily.      gabapentin (NEURONTIN) 100 MG capsule Take 3 capsules (300 mg total) by mouth 3 (three) times daily. 270 capsule 3    levothyroxine (SYNTHROID) 25 MCG tablet Take 25 mcg by mouth once daily.      mupirocin (BACTROBAN) 2 % ointment Apply topically once daily. (Patient not taking: Reported on 12/31/2021) 30 g 2    OLANZapine (ZYPREXA) 2.5 MG tablet Take 2.5 mg by mouth once daily.      omega-3 fatty acids 500 mg Cap Take 1 capsule by mouth once daily.      sodium chloride 0.9 % PgBk 100 mL with ertapenem 1 gram SolR 1 g Inject 1 g into the vein.      tamsulosin (FLOMAX) 0.4 mg Cap Take 1 capsule by mouth nightly.        Allergies: Patient has no known allergies.    Family History   Problem Relation Age of Onset    Heart disease Father      Social History     Tobacco Use    Smoking status: Current Every Day Smoker     Packs/day: 0.25    Smokeless tobacco: Current User   Substance Use Topics    Alcohol use: Yes     Comment: occassionally    Drug use: Never     Review of Systems   Unable to perform ROS: Acuity of condition     Objective:     Vitals:    Temp: 97.8 °F (36.6  °C)  Pulse: 63  BP: 117/64  Resp: 15  SpO2: 97 %    Temp  Min: 97.8 °F (36.6 °C)  Max: 99 °F (37.2 °C)  Pulse  Min: 63  Max: 102  BP  Min: 110/68  Max: 155/105  MAP (mmHg)  Min: 86  Max: 121  Resp  Min: 10  Max: 16  SpO2  Min: 97 %  Max: 100 %    No intake/output data recorded.           Physical Exam  Constitutional: Well-nourished, well-developed. No obvious distress.  Eyes: Clear conjunctiva. Anicteric. No discharge. Lids without lesions.  HEENT: MMM. Nose, external ears atraumatic.  Cardio: RRR. Pulses intact. Capillary refill time < 2 seconds.  Respiratory: Clear to auscultation. Regular effort.  GI: Bowel sounds present. Soft, non-tender. + Distention.  Extremities: L AKA with wound present    Neurologic:  [id number]  Follows some simple commands  PERRL, EOMI  Moves R side spontaneously and AG  LUE no response to painful stimuli (baseline 2/2 to previous stroke)     Today I personally reviewed pertinent medications, lines/drains/airways, imaging, cardiology results, laboratory results, microbiology results, notably:        Assessment/Plan:     Neuro  * Tonic clonic seizures  72 y/o M with new onset tonic clonic seizure-like activity of the R side witnessed by both his wife and EMS. Given 2.5 versed en route to [facility] and loaded with 3 g keppra.   -cEEG  -No prior history of seizures   -Keppra 1 g BID         Acute on chronic intracranial subdural hematoma  -Admit to [facility], NSGY following  -q1h neuro checks, vital checks  -EKG, ECHO, CXR  -A1c, TSH, lipid panel, coag panel  -Daily CBC, CMP, mag, phos  -SBP < 160, prn labetalol and hydralazine  -SCDs, hold DVT chemoppx in setting of acute bleed  -Hold ASA  -CT 8 mm AoC SDH along the R posterior cerebral convexity with localized mass effect but no significant MLS  -Keppra 1 g bid   -PT/OT/SLP  -Repeat CT pending    History of CVA with residual deficit  CTH with multifocal large remote appearing infarcts, R>L with generalized cerebral volume loss and chronic ischemic  changes  Statin  Hold ASA in setting of AoC SDH  q1h neuro checks  PT/OT/SLP  Repeat CTH pending      Cardiac/Vascular  Peripheral vascular disease  statin    Hyperlipidemia  Resume home statin  Lipid panel pending      Essential hypertension  SBP < 160  Resume home carvedilol      CHF (congestive heart failure)  -History of CHF per chart review, no ECHO available  -Repeat ECHO, CXR pending  -resume home digoxin, digoxin level pending    Endocrine  Hypothyroidism  TSH pending  Resume home synthroid    Orthopedic  S/P AKA (above knee amputation) unilateral, left  History of L AKA with poor wound healing  -wound care consult  -currently afebrile with WBC WNL, bcx from [facility] pending    Other  Elevated lactic acid level  -Present on admission, repeat pending  -Procal negative  -Afebrile and WBC WNL  -Blood cx from [facility] pending        The patient is being Prophylaxed for:  Venous Thromboembolism with: Mechanical  Stress Ulcer with: None  Ventilator Pneumonia with: none    Activity Orders          Turn patient starting at 12/31 1400    Elevate HOB starting at 12/31 1347    Diet NPO: NPO starting at 12/31 1347        Full Code     Critical condition in that Patient has a condition that poses threat to life and bodily function: AoC SDH, history of multiple CVAs, new tonic clonic seizures, airway watch, L AKA with poor wound healing, hypertension, CHF, peripheral vascular disease     35 minutes of Critical care time was spent personally by me on the following activities: development of treatment plan with patient or surrogate and bedside caregivers, discussions with consultants, evaluation of patient's response to treatment, examination of patient, ordering and performing treatments and interventions, ordering and review of laboratory studies, ordering and review of radiographic studies, pulse oximetry, antibiotic titration if applicable, vasopressor titration if applicable, re-evaluation of patient's condition. This critical  care time did not overlap with that of any other provider or involve time for any procedures. There is high probability for acute neurological change leading to clinical and possibly life-threatening deterioration requiring highest level of physician preparedness for urgent intervention.    Ladan Moyer PA-C  Neurocritical Care  [facility]

## 2021-12-31 NOTE — SUBJECTIVE & OBJECTIVE
Past Medical History:   Diagnosis Date    Congestive heart failure     Peripheral vascular disease     S/P AKA (above knee amputation)      Past Surgical History:   Procedure Laterality Date    AMPUTATION      HIP SURGERY        Current Facility-Administered Medications on File Prior to Encounter   Medication Dose Route Frequency Provider Last Rate Last Admin    [COMPLETED] levETIRAcetam (KEPPRA) 3,000 mg in dextrose 5 % 250 mL IVPB  3,000 mg Intravenous ED 1 Time Pa Zelaya MD   Stopped at 12/31/21 1149     Current Outpatient Medications on File Prior to Encounter   Medication Sig Dispense Refill    acetaminophen (TYLENOL) 500 MG tablet Take 1,000 mg by mouth 2 (two) times daily as needed for Pain.      ascorbic acid, vitamin C, (VITAMIN C) 1000 MG tablet Take 1,000 mg by mouth once daily.      atorvastatin (LIPITOR) 20 MG tablet Take 20 mg by mouth nightly.      bisacodyL (DULCOLAX) 5 mg EC tablet Take 5 mg by mouth daily as needed for Constipation.      carvediloL (COREG) 12.5 MG tablet Take 12.5 mg by mouth 2 (two) times daily.      cholecalciferol, vitamin D3, 125 mcg (5,000 unit) Tab Take 5,000 Units by mouth once a week.      cyanocobalamin 500 MCG tablet Take 500 mcg by mouth once daily.      digoxin (LANOXIN) 125 mcg tablet Take 0.125 mg by mouth once daily.      DULoxetine (CYMBALTA) 60 MG capsule Take 60 mg by mouth 2 (two) times daily.      gabapentin (NEURONTIN) 100 MG capsule Take 3 capsules (300 mg total) by mouth 3 (three) times daily. 270 capsule 3    levothyroxine (SYNTHROID) 25 MCG tablet Take 25 mcg by mouth once daily.      mupirocin (BACTROBAN) 2 % ointment Apply topically once daily. (Patient not taking: Reported on 12/31/2021) 30 g 2    OLANZapine (ZYPREXA) 2.5 MG tablet Take 2.5 mg by mouth once daily.      omega-3 fatty acids 500 mg Cap Take 1 capsule by mouth once daily.      sodium chloride 0.9 % PgBk 100 mL with ertapenem 1 gram SolR 1 g Inject 1 g into the  vein.      tamsulosin (FLOMAX) 0.4 mg Cap Take 1 capsule by mouth nightly.        Allergies: Patient has no known allergies.    Family History   Problem Relation Age of Onset    Heart disease Father      Social History     Tobacco Use    Smoking status: Current Every Day Smoker     Packs/day: 0.25    Smokeless tobacco: Current User   Substance Use Topics    Alcohol use: Yes     Comment: occassionally    Drug use: Never     Review of Systems   Unable to perform ROS: Acuity of condition     Objective:     Vitals:    Temp: 97.8 °F (36.6 °C)  Pulse: 63  BP: 117/64  Resp: 15  SpO2: 97 %    Temp  Min: 97.8 °F (36.6 °C)  Max: 99 °F (37.2 °C)  Pulse  Min: 63  Max: 102  BP  Min: 110/68  Max: 155/105  MAP (mmHg)  Min: 86  Max: 121  Resp  Min: 10  Max: 16  SpO2  Min: 97 %  Max: 100 %    No intake/output data recorded.           Physical Exam  Constitutional: Well-nourished, well-developed. No obvious distress.  Eyes: Clear conjunctiva. Anicteric. No discharge. Lids without lesions.  HEENT: MMM. Nose, external ears atraumatic.  Cardio: RRR. Pulses intact. Capillary refill time < 2 seconds.  Respiratory: Clear to auscultation. Regular effort.  GI: Bowel sounds present. Soft, non-tender. + Distention.  Extremities: L AKA with wound present    Neurologic:  [id number]  Follows some simple commands  PERRL, EOMI  Moves R side spontaneously and AG  LUE no response to painful stimuli (baseline 2/2 to previous stroke)     Today I personally reviewed pertinent medications, lines/drains/airways, imaging, cardiology results, laboratory results, microbiology results, notably:

## 2021-12-31 NOTE — ASSESSMENT & PLAN NOTE
-History of CHF per chart review, no ECHO available  -Repeat ECHO, CXR pending  -resume home digoxin, digoxin level pending

## 2021-12-31 NOTE — ED NOTES
Pt has wound to lt stump,  Dime size to lt testicle good granulation tissue noted, rt waist woun linear 2cm x 1cm good granulation tissue noted, wound to lt hip unstageable.

## 2021-12-31 NOTE — ASSESSMENT & PLAN NOTE
-Admit to [facility], NSGY following  -q1h neuro checks, vital checks  -EKG, ECHO, CXR  -A1c, TSH, lipid panel, coag panel  -Daily CBC, CMP, mag, phos  -SBP < 160, prn labetalol and hydralazine  -SCDs, hold DVT chemoppx in setting of acute bleed  -Hold ASA  -CT 8 mm AoC SDH along the R posterior cerebral convexity with localized mass effect but no significant MLS  -Keppra 1 g bid   -PT/OT/SLP  -Repeat CT pending

## 2021-12-31 NOTE — HPI
Mr. Blair is a 70 yo M with PMHx of PVD, CHF, and recent L AKA who presents to [facility] for new onset seizures and an AoC SDH. He originally presented to to the ED at [facility] via EMS with the complaint of seizure. Per his wife, the patient was in his normal state health yesterday and this morning.  She heard the bed shaking and found her  kicking his right leg and shaking his right arm in a manner most consistent with a tonic-clonic seizure approximately 1 hour prior to arrival.  Patient's wife states that she called EMS and upon EMS arrival patient was awake but postictal.  He was then being transported to the ambulance where he reportedly had another seizure and was given 2.5 mg of Versed.  Per chart review, the patient's wife states he is currently receiving antibiotics via a PICC for infected left AKA. She also denied any history of seizures or recent illness. Of note, the patient had a stroke approximately 10 years ago  for which he has residual deficit on the left side of his body and takes daily ASA. He was loaded with 3 g keppra prior to transfer to [facility].    He is being admitted to [facility] for hourly neuromonitoring and a higher level of care.

## 2021-12-31 NOTE — ASSESSMENT & PLAN NOTE
CTH with multifocal large remote appearing infarcts, R>L with generalized cerebral volume loss and chronic ischemic changes  Statin  Hold ASA in setting of AoC SDH  q1h neuro checks  PT/OT/SLP  Repeat CTH pending

## 2021-12-31 NOTE — ED PROVIDER NOTES
Encounter Date: 12/31/2021       History     Chief Complaint   Patient presents with    Seizures     Brought to ED from home for seizures. Pt had tonic clonic seizure and was given 2.5mg versed en route. Pt is currently post ictal. Pt has PICC for Iv ABX for left stump.     Patient is a 70 yo M with pmhx of PVD, CHF, recent L AKA who presents to the ED via EMS with the complaint of seizure.  Wife, patient was in his normal state health yesterday and this morning.  She heard the bed shaking and found her  kicking his right leg and shaking his right arm in a manner most consistent with a tonic-clonic seizure approximately 1 hour prior to arrival.  Patient's wife states that she called EMS and upon EMS arrival patient was awake but postictal.  Patient was being transported to the ambulance where he reportedly had another seizure and was given 2.5 mg of Versed.  Per wife patient is currently receiving antibiotics versus a PICC for infected left AKA.  The patient's wife denies any history of seizures or recent illness.  The patient's wife states that the patient had a stroke approximately 10 years ago  for which he has residual deficit on the left side of his body. Pt takes an aspirin daily.              Review of patient's allergies indicates:  No Known Allergies  Past Medical History:   Diagnosis Date    Congestive heart failure     Peripheral vascular disease     S/P AKA (above knee amputation)      Past Surgical History:   Procedure Laterality Date    AMPUTATION      HIP SURGERY       Family History   Problem Relation Age of Onset    Heart disease Father      Social History     Tobacco Use    Smoking status: Current Every Day Smoker     Packs/day: 0.25    Smokeless tobacco: Current User   Substance Use Topics    Alcohol use: Yes     Comment: occassionally    Drug use: Never     Review of Systems   Unable to perform ROS: Mental status change (Pt received 2.5 mg Versed )       Physical Exam      Initial Vitals [12/31/21 0913]   BP Pulse Resp Temp SpO2   110/68 102 16 99 °F (37.2 °C) 99 %      MAP       --         Physical Exam    Nursing note and vitals reviewed.  Constitutional: He appears well-developed and well-nourished.   HENT:   Head: Normocephalic and atraumatic.   No signs of head trauma noted   Eyes: Pupils are equal, round, and reactive to light.   Neck: Neck supple.   Cardiovascular: Normal rate, regular rhythm, normal heart sounds and intact distal pulses.   Pulmonary/Chest: Breath sounds normal.   Abdominal: Abdomen is soft. Bowel sounds are normal.   Genitourinary:    Genitourinary Comments: Patient has a small ulceration noted to the left scrotal region; there are areas of inflamed skin but no findings suggestive of Fermin's gangrene; no crepitus     Musculoskeletal:         General: No edema.      Cervical back: Neck supple.      Comments: L AKA; bandage covering wound; bandage not removed and there is no surrounding erythema or signs if infection around the bandage or elsewhere to the L stump.   PICC to the RUE.      Skin: Skin is warm. Capillary refill takes less than 2 seconds.         ED Course   Critical Care    Date/Time: 12/31/2021 12:25 PM  Performed by: Pa Zelaya MD  Authorized by: Pa Zelaya MD   Direct patient critical care time: 20 minutes  Additional history critical care time: 10 minutes  Ordering / reviewing critical care time: 5 minutes  Documentation critical care time: 5 minutes  Consulting other physicians critical care time: 5 minutes  Consult with family critical care time: 5 minutes  Other critical care time: 10 minutes  Total critical care time (exclusive of procedural time) : 60 minutes        Labs Reviewed   CBC W/ AUTO DIFFERENTIAL - Abnormal; Notable for the following components:       Result Value    RBC 3.68 (*)     Hemoglobin 11.2 (*)     Hematocrit 35.9 (*)     MCHC 31.2 (*)     RDW 16.0 (*)     Immature Granulocytes 1.1 (*)     Gran #  (ANC) 8.7 (*)     Immature Grans (Abs) 0.13 (*)     Lymph % 17.1 (*)     All other components within normal limits   COMPREHENSIVE METABOLIC PANEL - Abnormal; Notable for the following components:    Glucose 186 (*)     Creatinine 1.6 (*)     eGFR if  50 (*)     eGFR if non  43 (*)     All other components within normal limits   LACTIC ACID, PLASMA - Abnormal; Notable for the following components:    Lactate (Lactic Acid) 4.2 (*)     All other components within normal limits    Narrative:      LACT  critical result(s) called and verbal readback obtained from JADA Hudson RN by JAMES 12/31/2021 10:36   POCT GLUCOSE - Abnormal; Notable for the following components:    POCT Glucose 168 (*)     All other components within normal limits   INFLUENZA A & B BY MOLECULAR   CULTURE, BLOOD   CULTURE, BLOOD   TROPONIN I   CK   PROCALCITONIN   PROTIME-INR   APTT   SARS-COV-2 RNA AMPLIFICATION, QUAL   URINALYSIS, REFLEX TO URINE CULTURE   DRUG SCREEN PANEL, URINE EMERGENCY   SARS-COV-2 RDRP GENE   POCT GLUCOSE MONITORING CONTINUOUS     EKG Readings: (Independently Interpreted)   Initial Reading: No STEMI. Heart Rate: 92. ST Segments: Non-Specific ST Segment Depression.   NSR   Incomplete RBB         Imaging Results           CT Head Without Contrast (Final result)  Result time 12/31/21 09:50:18    Final result by Raleigh Sumner MD (12/31/21 09:50:18)                 Impression:      8 mm acute on chronic subdural hematoma along the right posterior cerebral convexity with localized mass effect but no significant midline shift.    Multifocal large remote appearing infarcts, right side greater than left.    Generalized cerebral volume loss and chronic ischemic changes.    New complete opacification of the bilateral mastoid air cells and extensive paranasal sinus disease.    This report was flagged in Epic as abnormal.    COMMUNICATION  This critical result was discovered/received at 09:43.  The  critical information above was relayed directly by Raleigh Sumner MD by [employer] secure chat (with acknowledgement) to Pa Zelaya MD on 12/31/2021 at 09:45.      Electronically signed by: Raleigh Sumner MD  Date:    12/31/2021  Time:    09:50             Narrative:    EXAMINATION:  CT HEAD WITHOUT CONTRAST    CLINICAL HISTORY:  Seizure, new-onset, no history of trauma;    TECHNIQUE:  Low dose axial images were obtained through the head.  Coronal and sagittal reformations were also performed. Contrast was not administered.    COMPARISON:  None.    FINDINGS:  Multiple large areas of encephalomalacia in the right frontoparietal and temporal lobes suggestive of prior large right-sided infarct.  Additional areas of encephalomalacia in the left frontotemporal lobes.  Probable small remote infarcts in the carmine and bilateral basal ganglia.    No convincing evidence of acute territorial infarct allowing for limitations in the setting of multiple remote infarcts.  No acute parenchymal hemorrhage.  No significant midline shift.  8 mm predominantly hypoattenuating collection along the right posterior cerebral convexity with small region of increased density layering dependently, suggestive of acute on chronic subdural hematoma.  There is slight localized mass effect without significant midline shift.    Generalized cerebral volume loss with ex vacuo dilation of the ventricles and sulci.  Ex vacuo dilation of the ventricles also likely related to multiple remote appearing infarcts.    No displaced calvarial fracture.    New complete opacification of the bilateral mastoid air cells.  Diffuse mucosal thickening throughout the paranasal sinuses, most pronounced in the maxillary sinuses.  Partially visualized nasal endotracheal tube.                               X-Ray Chest AP Portable (Final result)  Result time 12/31/21 09:45:31    Final result by Yeimy Quinn MD (12/31/21 09:45:31)                 Impression:       Mild reticulonodular pattern throughout the lungs.  Consider non emergent CT of the chest to further evaluate.  There are no acute findings on today's exam.      Electronically signed by: Yeimy Quinn MD  Date:    12/31/2021  Time:    09:45             Narrative:    EXAMINATION:  XR CHEST AP PORTABLE    CLINICAL HISTORY:  Altered mental status, unspecified    TECHNIQUE:  Single frontal view of the chest was performed.    COMPARISON:  01/09/2005    FINDINGS:  The lungs are symmetrically inflated.  There is a mild diffuse reticulonodular pattern throughout the lung parenchyma.  Otherwise no mass, nodule, pneumothorax, airspace consolidation or pleural effusion.  The cardiomediastinal silhouette, osseous and soft tissue structures are normal.                                 Medications   levETIRAcetam (KEPPRA) 3,000 mg in dextrose 5 % 250 mL IVPB (0 mg Intravenous Stopped 12/31/21 1149)     Medical Decision Making:   Clinical Tests:   Lab Tests: Reviewed and Ordered  Radiological Study: Ordered and Reviewed  ED Management:  - POCT glucose WNL   - CT head WO demonstrates 8 mm acute on chronic subdural hematoma along the right posterior cerebral convexity with localized mass effect but no significant midline shift per final radiology read.   - discussed case with neuro critical care physician  who recommends 3 g IV Keppra; blood pressure control; she will accept the patient as a stat transfer to her service; patient's wife and daughter at bedside updated regarding CT scan findings, need for emergent transfer for higher level of care, neuro critical care  - laboratory analysis unremarkable other than Cr 1.6,  lactic acid of 4; coags within normal limits; no significant leukocytosis; hemoglobin and hematocrit are stable  - CXR without acute cardiopulmonary process per my interpretation, final radiology read   - UDS, urinalysis ordered, pending  -  patient stable for transfer at this time; vital signs are  stable; patient protecting his airway; patient more arousable prior to transfer, moving extremities             ED Course as of 12/31/21 1227   Fri Dec 31, 2021   1011 Spoke to neuro critical care physician Dr. Starr who recommends 3 g Keppra IV and SBP < 160  at this time. She will accept pt for STAT transfer.  [LC]      ED Course User Index  [LC] Pa Zelaya MD             Clinical Impression:   Final diagnoses:  [S06.5X9A] Subdural hematoma (Primary)          ED Disposition Condition    Transfer to Another Facility Stable              Pa Zelaya MD  12/31/21 1221

## 2021-12-31 NOTE — ASSESSMENT & PLAN NOTE
History of L AKA with poor wound healing  -wound care consult  -currently afebrile with WBC WNL, bcx from [facility] pending

## 2021-12-31 NOTE — PHARMACY MED REC
"  Admission Medication History     The home medication history was taken by Jaylyn Melchor CPhT.    Medication history obtained from,[facility]    You may go to "Admission" then "Reconcile Home Medications" tabs to review and/or act upon these items.      The home medication list has been updated by the Pharmacy department.    Please read ALL comments highlighted in yellow.    Please address this information as you see fit.     Feel free to contact us if you have any questions or require assistance.        Jaylyn Melchor CPhT.  Ext [phone]                .          "

## 2021-12-31 NOTE — ASSESSMENT & PLAN NOTE
72 y/o M with new onset tonic clonic seizure-like activity of the R side witnessed by both his wife and EMS. Given 2.5 versed en route to [facility] and loaded with 3 g keppra.   -cEEG  -No prior history of seizures   -Keppra 1 g BID

## 2022-01-01 LAB
ALBUMIN SERPL BCP-MCNC: 3.5 G/DL (ref 3.5–5.2)
ALP SERPL-CCNC: 93 U/L (ref 55–135)
ALT SERPL W/O P-5'-P-CCNC: 13 U/L (ref 10–44)
ANION GAP SERPL CALC-SCNC: 9 MMOL/L (ref 8–16)
ASCENDING AORTA: 3.09 CM
AST SERPL-CCNC: 18 U/L (ref 10–40)
AV INDEX (PROSTH): 1.34
AV MEAN GRADIENT: 2 MMHG
AV PEAK GRADIENT: 2 MMHG
AV VALVE AREA: 4.6 CM2
AV VELOCITY RATIO: 1.12
BASOPHILS # BLD AUTO: 0.09 K/UL (ref 0–0.2)
BASOPHILS NFR BLD: 0.8 % (ref 0–1.9)
BILIRUB SERPL-MCNC: 0.3 MG/DL (ref 0.1–1)
BSA FOR ECHO PROCEDURE: 1.83 M2
BUN SERPL-MCNC: 15 MG/DL (ref 8–23)
CALCIUM SERPL-MCNC: 9.5 MG/DL (ref 8.7–10.5)
CHLORIDE SERPL-SCNC: 102 MMOL/L (ref 95–110)
CO2 SERPL-SCNC: 27 MMOL/L (ref 23–29)
CREAT SERPL-MCNC: 1.1 MG/DL (ref 0.5–1.4)
CV ECHO LV RWT: 0.38 CM
DIFFERENTIAL METHOD: ABNORMAL
DOP CALC AO PEAK VEL: 0.75 M/S
DOP CALC AO VTI: 13.37 CM
DOP CALC LVOT AREA: 3.4 CM2
DOP CALC LVOT DIAMETER: 2.09 CM
DOP CALC LVOT PEAK VEL: 0.84 M/S
DOP CALC LVOT STROKE VOLUME: 61.52 CM3
DOP CALCLVOT PEAK VEL VTI: 17.94 CM
E WAVE DECELERATION TIME: 222.36 MSEC
E/A RATIO: 0.8
E/E' RATIO: 9 M/S
ECHO LV POSTERIOR WALL: 0.99 CM (ref 0.6–1.1)
EJECTION FRACTION: 50 %
EOSINOPHIL # BLD AUTO: 0.3 K/UL (ref 0–0.5)
EOSINOPHIL NFR BLD: 2.8 % (ref 0–8)
ERYTHROCYTE [DISTWIDTH] IN BLOOD BY AUTOMATED COUNT: 15.9 % (ref 11.5–14.5)
EST. GFR  (AFRICAN AMERICAN): >60 ML/MIN/1.73 M^2
EST. GFR  (NON AFRICAN AMERICAN): >60 ML/MIN/1.73 M^2
FRACTIONAL SHORTENING: 24 % (ref 28–44)
GLUCOSE SERPL-MCNC: 121 MG/DL (ref 70–110)
HCT VFR BLD AUTO: 35.5 % (ref 40–54)
HGB BLD-MCNC: 11.4 G/DL (ref 14–18)
IMM GRANULOCYTES # BLD AUTO: 0.05 K/UL (ref 0–0.04)
IMM GRANULOCYTES NFR BLD AUTO: 0.4 % (ref 0–0.5)
INTERVENTRICULAR SEPTUM: 0.96 CM (ref 0.6–1.1)
LEFT ATRIUM SIZE: 2.95 CM
LEFT ATRIUM VOLUME INDEX MOD: 22.3 ML/M2
LEFT ATRIUM VOLUME MOD: 40 CM3
LEFT INTERNAL DIMENSION IN SYSTOLE: 3.94 CM (ref 2.1–4)
LEFT VENTRICLE DIASTOLIC VOLUME INDEX: 72.82 ML/M2
LEFT VENTRICLE DIASTOLIC VOLUME: 130.34 ML
LEFT VENTRICLE MASS INDEX: 105 G/M2
LEFT VENTRICLE SYSTOLIC VOLUME INDEX: 37.8 ML/M2
LEFT VENTRICLE SYSTOLIC VOLUME: 67.63 ML
LEFT VENTRICULAR INTERNAL DIMENSION IN DIASTOLE: 5.21 CM (ref 3.5–6)
LEFT VENTRICULAR MASS: 188.33 G
LV LATERAL E/E' RATIO: 7.5 M/S
LV SEPTAL E/E' RATIO: 11.25 M/S
LYMPHOCYTES # BLD AUTO: 2.3 K/UL (ref 1–4.8)
LYMPHOCYTES NFR BLD: 20.2 % (ref 18–48)
MAGNESIUM SERPL-MCNC: 2.2 MG/DL (ref 1.6–2.6)
MCH RBC QN AUTO: 30.1 PG (ref 27–31)
MCHC RBC AUTO-ENTMCNC: 32.1 G/DL (ref 32–36)
MCV RBC AUTO: 94 FL (ref 82–98)
MONOCYTES # BLD AUTO: 0.8 K/UL (ref 0.3–1)
MONOCYTES NFR BLD: 7.2 % (ref 4–15)
MV PEAK A VEL: 0.56 M/S
MV PEAK E VEL: 0.45 M/S
MV STENOSIS PRESSURE HALF TIME: 64.49 MS
MV VALVE AREA P 1/2 METHOD: 3.41 CM2
NEUTROPHILS # BLD AUTO: 7.9 K/UL (ref 1.8–7.7)
NEUTROPHILS NFR BLD: 68.6 % (ref 38–73)
NRBC BLD-RTO: 0 /100 WBC
PHOSPHATE SERPL-MCNC: 1.8 MG/DL (ref 2.7–4.5)
PISA TR MAX VEL: 1.6 M/S
PLATELET # BLD AUTO: 345 K/UL (ref 150–450)
PMV BLD AUTO: 9.4 FL (ref 9.2–12.9)
POCT GLUCOSE: 109 MG/DL (ref 70–110)
POCT GLUCOSE: 127 MG/DL (ref 70–110)
POCT GLUCOSE: 137 MG/DL (ref 70–110)
POTASSIUM SERPL-SCNC: 3.6 MMOL/L (ref 3.5–5.1)
PROT SERPL-MCNC: 7.6 G/DL (ref 6–8.4)
RBC # BLD AUTO: 3.79 M/UL (ref 4.6–6.2)
RV TISSUE DOPPLER FREE WALL SYSTOLIC VELOCITY 1 (APICAL 4 CHAMBER VIEW): 10.38 CM/S
SINUS: 3.37 CM
SODIUM SERPL-SCNC: 138 MMOL/L (ref 136–145)
STJ: 2.74 CM
TDI LATERAL: 0.06 M/S
TDI SEPTAL: 0.04 M/S
TDI: 0.05 M/S
TR MAX PG: 10 MMHG
TRICUSPID ANNULAR PLANE SYSTOLIC EXCURSION: 1.93 CM
WBC # BLD AUTO: 11.52 K/UL (ref 3.9–12.7)

## 2022-01-01 PROCEDURE — 99900035 HC TECH TIME PER 15 MIN (STAT)

## 2022-01-01 PROCEDURE — 25000003 PHARM REV CODE 250: Performed by: PSYCHIATRY & NEUROLOGY

## 2022-01-01 PROCEDURE — 63600175 PHARM REV CODE 636 W HCPCS

## 2022-01-01 PROCEDURE — 20000000 HC ICU ROOM

## 2022-01-01 PROCEDURE — 84100 ASSAY OF PHOSPHORUS: CPT

## 2022-01-01 PROCEDURE — 99233 PR SUBSEQUENT HOSPITAL CARE,LEVL III: ICD-10-PCS | Mod: ,,,

## 2022-01-01 PROCEDURE — 97162 PT EVAL MOD COMPLEX 30 MIN: CPT

## 2022-01-01 PROCEDURE — 80053 COMPREHEN METABOLIC PANEL: CPT

## 2022-01-01 PROCEDURE — 97530 THERAPEUTIC ACTIVITIES: CPT

## 2022-01-01 PROCEDURE — 97112 NEUROMUSCULAR REEDUCATION: CPT

## 2022-01-01 PROCEDURE — 94761 N-INVAS EAR/PLS OXIMETRY MLT: CPT

## 2022-01-01 PROCEDURE — 83735 ASSAY OF MAGNESIUM: CPT

## 2022-01-01 PROCEDURE — 99233 SBSQ HOSP IP/OBS HIGH 50: CPT | Mod: ,,,

## 2022-01-01 PROCEDURE — 92610 EVALUATE SWALLOWING FUNCTION: CPT

## 2022-01-01 PROCEDURE — 25000003 PHARM REV CODE 250

## 2022-01-01 PROCEDURE — 85025 COMPLETE CBC W/AUTO DIFF WBC: CPT

## 2022-01-01 PROCEDURE — 27000221 HC OXYGEN, UP TO 24 HOURS

## 2022-01-01 RX ORDER — LANOLIN ALCOHOL/MO/W.PET/CERES
800 CREAM (GRAM) TOPICAL
Status: DISCONTINUED | OUTPATIENT
Start: 2022-01-01 | End: 2022-01-04

## 2022-01-01 RX ORDER — SODIUM,POTASSIUM PHOSPHATES 280-250MG
2 POWDER IN PACKET (EA) ORAL
Status: DISCONTINUED | OUTPATIENT
Start: 2022-01-01 | End: 2022-01-04

## 2022-01-01 RX ORDER — AMOXICILLIN 250 MG
1 CAPSULE ORAL 2 TIMES DAILY
Status: DISCONTINUED | OUTPATIENT
Start: 2022-01-01 | End: 2022-01-02

## 2022-01-01 RX ORDER — LEVETIRACETAM 500 MG/1
1000 TABLET ORAL 2 TIMES DAILY
Status: DISCONTINUED | OUTPATIENT
Start: 2022-01-01 | End: 2022-01-05 | Stop reason: HOSPADM

## 2022-01-01 RX ORDER — HEPARIN SODIUM 5000 [USP'U]/ML
5000 INJECTION, SOLUTION INTRAVENOUS; SUBCUTANEOUS EVERY 8 HOURS
Status: DISCONTINUED | OUTPATIENT
Start: 2022-01-01 | End: 2022-01-05 | Stop reason: HOSPADM

## 2022-01-01 RX ORDER — AMLODIPINE BESYLATE 5 MG/1
5 TABLET ORAL DAILY
Status: DISCONTINUED | OUTPATIENT
Start: 2022-01-01 | End: 2022-01-02

## 2022-01-01 RX ADMIN — ATORVASTATIN CALCIUM 20 MG: 20 TABLET, FILM COATED ORAL at 08:01

## 2022-01-01 RX ADMIN — GABAPENTIN 300 MG: 250 SOLUTION ORAL at 05:01

## 2022-01-01 RX ADMIN — LEVETIRACETAM 1000 MG: 500 TABLET, FILM COATED ORAL at 09:01

## 2022-01-01 RX ADMIN — GABAPENTIN 300 MG: 250 SOLUTION ORAL at 03:01

## 2022-01-01 RX ADMIN — DIGOXIN 0.12 MG: 125 TABLET ORAL at 08:01

## 2022-01-01 RX ADMIN — SENNOSIDES AND DOCUSATE SODIUM 1 TABLET: 50; 8.6 TABLET ORAL at 09:01

## 2022-01-01 RX ADMIN — GABAPENTIN 300 MG: 250 SOLUTION ORAL at 09:01

## 2022-01-01 RX ADMIN — CARVEDILOL 12.5 MG: 12.5 TABLET, FILM COATED ORAL at 09:01

## 2022-01-01 RX ADMIN — LEVETIRACETAM 1000 MG: 500 INJECTION, SOLUTION INTRAVENOUS at 08:01

## 2022-01-01 RX ADMIN — SENNOSIDES AND DOCUSATE SODIUM 1 TABLET: 50; 8.6 TABLET ORAL at 08:01

## 2022-01-01 RX ADMIN — AMLODIPINE BESYLATE 5 MG: 5 TABLET ORAL at 11:01

## 2022-01-01 RX ADMIN — CARVEDILOL 12.5 MG: 12.5 TABLET, FILM COATED ORAL at 08:01

## 2022-01-01 RX ADMIN — HYDRALAZINE HYDROCHLORIDE 10 MG: 20 INJECTION INTRAMUSCULAR; INTRAVENOUS at 02:01

## 2022-01-01 RX ADMIN — SILODOSIN 4 MG: 4 CAPSULE ORAL at 08:01

## 2022-01-01 RX ADMIN — HEPARIN SODIUM 5000 UNITS: 5000 INJECTION INTRAVENOUS; SUBCUTANEOUS at 03:01

## 2022-01-01 RX ADMIN — POLYETHYLENE GLYCOL 3350 17 G: 17 POWDER, FOR SOLUTION ORAL at 08:01

## 2022-01-01 RX ADMIN — DULOXETINE 60 MG: 60 CAPSULE, DELAYED RELEASE ORAL at 08:01

## 2022-01-01 RX ADMIN — OLANZAPINE 2.5 MG: 2.5 TABLET, FILM COATED ORAL at 08:01

## 2022-01-01 RX ADMIN — LEVOTHYROXINE SODIUM 25 MCG: 25 TABLET ORAL at 05:01

## 2022-01-01 RX ADMIN — DULOXETINE 60 MG: 60 CAPSULE, DELAYED RELEASE ORAL at 09:01

## 2022-01-01 NOTE — ASSESSMENT & PLAN NOTE
-Admit to NCC, NSGY following  -q1h neuro checks, vital checks  -EKG, ECHO, CXR  -A1c, TSH, lipid panel, coag panel  -Daily CBC, CMP, mag, phos  -SBP < 160, prn labetalol and hydralazine  -SCDs, hold DVT chemoppx in setting of acute bleed  -Hold ASA  -CT 8 mm AoC SDH along the R posterior cerebral convexity with localized mass effect but no significant MLS  -Keppra 1 g bid   -PT/OT/SLP  -Repeat CT stable

## 2022-01-01 NOTE — HOSPITAL COURSE
01/01/2022: EEG negative. Discontinue. Add Norvasc and SQH. Start TF. Step down orders to hospital medicine placed.   01/02/2022: Decrease coreg. D/c Norvasc. Boarding for hospital medicine.   01/03/2022 Coreg decreased to 3.125 BID. Continue Digoxin. Continues to board for hospital medicine

## 2022-01-01 NOTE — SUBJECTIVE & OBJECTIVE
Interval History:  EEG negative. Discontinue. Add Norvasc and SQH. Start TF. Step down orders to hospital medicine placed. Continue bowel regimen. KUB 1/1 with excessive stool burden.     Review of Systems   Unable to perform ROS: Acuity of condition     Objective:     Vitals:  Temp: 98.5 °F (36.9 °C)  Pulse: 60  Rhythm: normal sinus rhythm  BP: (!) 148/66  MAP (mmHg): 95  Resp: 14  SpO2: 98 %  O2 Device (Oxygen Therapy): nasal cannula    Temp  Min: 97.6 °F (36.4 °C)  Max: 98.5 °F (36.9 °C)  Pulse  Min: 51  Max: 71  BP  Min: 115/59  Max: 177/70  MAP (mmHg)  Min: 83  Max: 118  Resp  Min: 8  Max: 24  SpO2  Min: 80 %  Max: 100 %    12/31 0701 - 01/01 0700  In: 67.2 [I.V.:67.2]  Out: 650 [Urine:650]           Physical Exam  Constitutional: Well-nourished, well-developed. No obvious distress.  Eyes: Clear conjunctiva. Anicteric. No discharge. Lids without lesions.  HEENT: MMM. Nose, external ears atraumatic.  Cardio: RRR. Pulses intact. Capillary refill time < 2 seconds.  Respiratory: Clear to auscultation. Regular effort.  GI: Bowel sounds present. Soft, non-tender. + Distention.  Extremities: L AKA with wound present     Neurologic:  E4V3M6  More alert today, oriented only to self  Follows some simple commands  PERRL, EOMI  Moves R side spontaneously and AG  LUE no response to painful stimuli (baseline 2/2 to previous stroke)     Medications:  Continuous ScheduledamLODIPine, 5 mg, Daily  atorvastatin, 20 mg, Daily  carvediloL, 12.5 mg, BID  digoxin, 0.125 mg, Daily  DULoxetine, 60 mg, BID  gabapentin, 300 mg, Q8H  heparin (porcine), 5,000 Units, Q8H  levETIRAcetam, 1,000 mg, BID  levothyroxine, 25 mcg, Before breakfast  OLANZapine, 2.5 mg, Daily  polyethylene glycol, 17 g, Daily  senna-docusate 8.6-50 mg, 1 tablet, BID  silodosin, 4 mg, Daily    PRNdextrose 50%, 12.5 g, PRN  glucagon (human recombinant), 1 mg, PRN  hydrALAZINE, 10 mg, Q6H PRN  insulin aspart U-100, 1-10 Units, Q6H PRN  labetalol, 10 mg, Q6H  PRN  magnesium oxide, 800 mg, PRN  magnesium oxide, 800 mg, PRN  potassium bicarbonate, 35 mEq, PRN  potassium bicarbonate, 50 mEq, PRN  potassium bicarbonate, 60 mEq, PRN  potassium, sodium phosphates, 2 packet, PRN  potassium, sodium phosphates, 2 packet, PRN  potassium, sodium phosphates, 2 packet, PRN  sodium chloride 0.9%, 10 mL, PRN      Today I personally reviewed pertinent medications, lines/drains/airways, imaging, cardiology results, laboratory results, microbiology results, notably:    Diet  Diet NPO  Diet NPO

## 2022-01-01 NOTE — ASSESSMENT & PLAN NOTE
CTH with multifocal large remote appearing infarcts, R>L with generalized cerebral volume loss and chronic ischemic changes  Statin  Hold ASA in setting of AoC SDH  q1h neuro checks  PT/OT/SLP  Repeat CT stable

## 2022-01-01 NOTE — PLAN OF CARE
Recommendations    1. As medically able, ADAT to diabetic with texture per SLP.    - Suggest adding Santiago BID x 2 weeks for wound healing.    2. If TF warranted, recommend TF of Isosource 1.5 advancing as tolerated to goal rate of 45 mL/hr to provide 1620 kcal, 73 gm protein, and 825 mL free fluid.     3. Suggest adding MVI, vitamin C, and zinc to promote skin integrity.     4. RD following.     Goals: receive nutrition by RD follow-up  Nutrition Goal Status: new

## 2022-01-01 NOTE — PROGRESS NOTES
[facility]  Neurocritical Care  Progress Note    Admit Date: 12/31/2021  Service Date: 01/01/2022  Length of Stay: 1    Subjective:     Chief Complaint: Tonic clonic seizures    History of Present Illness: Mr. lBair is a 72 yo M with PMHx of PVD, CHF, and recent L AKA who presents to [facility] for new onset seizures and an AoC SDH. He originally presented to to the ED at [facility] via EMS with the complaint of seizure. Per his wife, the patient was in his normal state health yesterday and this morning.  She heard the bed shaking and found her  kicking his right leg and shaking his right arm in a manner most consistent with a tonic-clonic seizure approximately 1 hour prior to arrival.  Patient's wife states that she called EMS and upon EMS arrival patient was awake but postictal.  He was then being transported to the ambulance where he reportedly had another seizure and was given 2.5 mg of Versed.  Per chart review, the patient's wife states he is currently receiving antibiotics via a PICC for infected left AKA. She also denied any history of seizures or recent illness. Of note, the patient had a stroke approximately 10 years ago  for which he has residual deficit on the left side of his body and takes daily ASA. He was loaded with 3 g keppra prior to transfer to [facility].    He is being admitted to [facility] for hourly neuromonitoring and a higher level of care.       Hospital Course: 01/01/2022: EEG negative. Discontinue. Add Norvasc and SQH. Start TF. Step down orders to hospital medicine placed.       Interval History:  EEG negative. Discontinue. Add Norvasc and SQH. Start TF. Step down orders to hospital medicine placed. Continue bowel regimen. KUB 1/1 with excessive stool burden.     Review of Systems   Unable to perform ROS: Acuity of condition     Objective:     Vitals:  Temp: 98.5 °F (36.9 °C)  Pulse: 60  Rhythm: normal sinus rhythm  BP: (!) 148/66  MAP (mmHg): 95  Resp: 14  SpO2: 98 %  O2 Device (Oxygen  Therapy): nasal cannula    Temp  Min: 97.6 °F (36.4 °C)  Max: 98.5 °F (36.9 °C)  Pulse  Min: 51  Max: 71  BP  Min: 115/59  Max: 177/70  MAP (mmHg)  Min: 83  Max: 118  Resp  Min: 8  Max: 24  SpO2  Min: 80 %  Max: 100 %    12/31 0701 - 01/01 0700  In: 67.2 [I.V.:67.2]  Out: 650 [Urine:650]           Physical Exam  Constitutional: Well-nourished, well-developed. No obvious distress.  Eyes: Clear conjunctiva. Anicteric. No discharge. Lids without lesions.  HEENT: MMM. Nose, external ears atraumatic.  Cardio: RRR. Pulses intact. Capillary refill time < 2 seconds.  Respiratory: Clear to auscultation. Regular effort.  GI: Bowel sounds present. Soft, non-tender. + Distention.  Extremities: L AKA with wound present     Neurologic:  E4V3M6  More alert today, oriented only to self  Follows some simple commands  PERRL, EOMI  Moves R side spontaneously and AG  LUE no response to painful stimuli (baseline 2/2 to previous stroke)     Medications:  Continuous ScheduledamLODIPine, 5 mg, Daily  atorvastatin, 20 mg, Daily  carvediloL, 12.5 mg, BID  digoxin, 0.125 mg, Daily  DULoxetine, 60 mg, BID  gabapentin, 300 mg, Q8H  heparin (porcine), 5,000 Units, Q8H  levETIRAcetam, 1,000 mg, BID  levothyroxine, 25 mcg, Before breakfast  OLANZapine, 2.5 mg, Daily  polyethylene glycol, 17 g, Daily  senna-docusate 8.6-50 mg, 1 tablet, BID  silodosin, 4 mg, Daily    PRNdextrose 50%, 12.5 g, PRN  glucagon (human recombinant), 1 mg, PRN  hydrALAZINE, 10 mg, Q6H PRN  insulin aspart U-100, 1-10 Units, Q6H PRN  labetalol, 10 mg, Q6H PRN  magnesium oxide, 800 mg, PRN  magnesium oxide, 800 mg, PRN  potassium bicarbonate, 35 mEq, PRN  potassium bicarbonate, 50 mEq, PRN  potassium bicarbonate, 60 mEq, PRN  potassium, sodium phosphates, 2 packet, PRN  potassium, sodium phosphates, 2 packet, PRN  potassium, sodium phosphates, 2 packet, PRN  sodium chloride 0.9%, 10 mL, PRN      Today I personally reviewed pertinent medications, lines/drains/airways, imaging,  cardiology results, laboratory results, microbiology results, notably:    Diet  Diet NPO  Diet NPO        Assessment/Plan:     Neuro  * Tonic clonic seizures  70 y/o M with new onset tonic clonic seizure-like activity of the R side witnessed by both his wife and EMS. Given 2.5 versed en route to [facility] and loaded with 3 g keppra.   -cEEG negative 1/1, discontinue  -No prior history of seizures   -Keppra 1 g BID         Acute on chronic intracranial subdural hematoma  -Admit to [facility], NSGY following  -q1h neuro checks, vital checks  -EKG, ECHO, CXR  -A1c, TSH, lipid panel, coag panel  -Daily CBC, CMP, mag, phos  -SBP < 160, prn labetalol and hydralazine  -SCDs, hold DVT chemoppx in setting of acute bleed  -Hold ASA  -CT 8 mm AoC SDH along the R posterior cerebral convexity with localized mass effect but no significant MLS  -Keppra 1 g bid   -PT/OT/SLP  -Repeat CT stable    History of CVA with residual deficit  CTH with multifocal large remote appearing infarcts, R>L with generalized cerebral volume loss and chronic ischemic changes  Statin  Hold ASA in setting of AoC SDH  q1h neuro checks  PT/OT/SLP  Repeat CT stable      Cardiac/Vascular  Peripheral vascular disease  statin    Hyperlipidemia  Resume home statin        Essential hypertension  SBP < 160  Resume home carvedilol  Add Norvasc      CHF (congestive heart failure)  -History of CHF per chart review, no ECHO available  -ECHO, CXR   -resume home digoxin, digoxin level 0.6    Endocrine  Hypothyroidism  TSH 54, free T4 0.48 on admission  Resume home synthroid    Orthopedic  S/P AKA (above knee amputation) unilateral, left  History of L AKA with poor wound healing  -wound care consult  -currently afebrile with WBC WNL, bcx from [facility] pending    Other  Elevated lactic acid level  -Present on admission, repeat pending  -Procal negative  -Afebrile and WBC WNL  -Blood cx from [facility] pending    Resolved          The patient is being Prophylaxed for:  Venous Thromboembolism  with: Mechanical or Chemical  Stress Ulcer with: None  Ventilator Pneumonia with: none    Activity Orders          Turn patient starting at 12/31 1400    Elevate HOB starting at 12/31 1347    Diet NPO: NPO starting at 12/31 1347        DNR     Level III    ZEINAB RosalesC  Neurocritical Care  [facility] - Neuro Critical Care

## 2022-01-01 NOTE — CONSULTS
[facility]  Neurosurgery  Consult Note    Consults  Subjective:     Chief Complaint/Reason for Admission: seizure    History of Present Illness: Pt is 71M pmh prior R hemispheric stroke who presents today with two witnessed seizures per wife and EMS today. Pt transferred for further evaluation. CTH on workup showed small chronic subdural hygroma with small acute component. NSGY consulted.      Medications Prior to Admission   Medication Sig Dispense Refill Last Dose    acetaminophen (TYLENOL) 500 MG tablet Take 1,000 mg by mouth 2 (two) times daily as needed for Pain.       ascorbic acid, vitamin C, (VITAMIN C) 1000 MG tablet Take 1,000 mg by mouth once daily.       atorvastatin (LIPITOR) 20 MG tablet Take 20 mg by mouth nightly.       bisacodyL (DULCOLAX) 5 mg EC tablet Take 5 mg by mouth daily as needed for Constipation.       carvediloL (COREG) 12.5 MG tablet Take 12.5 mg by mouth 2 (two) times daily.       cholecalciferol, vitamin D3, 125 mcg (5,000 unit) Tab Take 5,000 Units by mouth once a week.       cyanocobalamin 500 MCG tablet Take 500 mcg by mouth once daily.       digoxin (LANOXIN) 125 mcg tablet Take 0.125 mg by mouth once daily.       DULoxetine (CYMBALTA) 60 MG capsule Take 60 mg by mouth 2 (two) times daily.       gabapentin (NEURONTIN) 100 MG capsule Take 3 capsules (300 mg total) by mouth 3 (three) times daily. 270 capsule 3     levothyroxine (SYNTHROID) 25 MCG tablet Take 25 mcg by mouth once daily.       mupirocin (BACTROBAN) 2 % ointment Apply topically once daily. (Patient not taking: Reported on 12/31/2021) 30 g 2     OLANZapine (ZYPREXA) 2.5 MG tablet Take 2.5 mg by mouth once daily.       omega-3 fatty acids 500 mg Cap Take 1 capsule by mouth once daily.       sodium chloride 0.9 % PgBk 100 mL with ertapenem 1 gram SolR 1 g Inject 1 g into the vein.       tamsulosin (FLOMAX) 0.4 mg Cap Take 1 capsule by mouth nightly.          Review of patient's allergies  indicates:  No Known Allergies    Past Medical History:   Diagnosis Date    Congestive heart failure     Peripheral vascular disease     S/P AKA (above knee amputation)      Past Surgical History:   Procedure Laterality Date    AMPUTATION      HIP SURGERY       Family History     Problem Relation (Age of Onset)    Heart disease Father        Tobacco Use    Smoking status: Current Every Day Smoker     Packs/day: 0.25    Smokeless tobacco: Current User   Substance and Sexual Activity    Alcohol use: Yes     Comment: occassionally    Drug use: Never    Sexual activity: Not Currently     Review of Systems   Unable to perform ROS: Mental status change     Objective:     Weight: 75.7 kg (166 lb 14.2 oz)  Body mass index is 29.56 kg/m².  Vital Signs (Most Recent):  Temp: 97.6 °F (36.4 °C) (12/31/21 1501)  Pulse: 71 (12/31/21 1801)  Resp: (!) 21 (12/31/21 1801)  BP: (!) 154/69 (12/31/21 1801)  SpO2: 99 % (12/31/21 1801) Vital Signs (24h Range):  Temp:  [97.6 °F (36.4 °C)-99 °F (37.2 °C)] 97.6 °F (36.4 °C)  Pulse:  [] 71  Resp:  [10-21] 21  SpO2:  [97 %-100 %] 99 %  BP: (110-169)/() 154/69     Date 12/31/21 0700 - 01/01/22 0659   Shift 6607-1879 5299-3112 0396-9640 24 Hour Total   INTAKE   I.V.(mL/kg)  67.2(0.9)  67.2(0.9)   Shift Total(mL/kg)  67.2(0.9)  67.2(0.9)   OUTPUT   Shift Total(mL/kg)       Weight (kg) 75.7 75.7 75.7 75.7                        NG/OG Tube 12/31/21 1345 Right nostril (Active)       Male External Urinary Catheter 12/31/21 1345 Small (Active)       Neurosurgery Physical Exam  [id number]  Awake, [place] speaking, confused, Oriented to self only with dysarthria  PERRL, CN grossly intact  FC right side, paretic left with left leg amputation      Significant Labs:  Recent Labs   Lab 12/31/21  1002   *      K 4.8      CO2 24   BUN 19   CREATININE 1.6*   CALCIUM 9.8     Recent Labs   Lab 12/31/21  1002 12/31/21  1909   WBC 12.22  --    HGB 11.2*  --    HCT 35.9* 34*      --      Recent Labs   Lab 12/31/21  1019   INR 1.1   APTT 29.3     Microbiology Results (last 7 days)     ** No results found for the last 168 hours. **        All pertinent labs from the last 24 hours have been reviewed.    Significant Diagnostics:  I have reviewed all pertinent imaging results/findings within the past 24 hours.  I have reviewed and interpreted all pertinent imaging results/findings within the past 24 hours.    Assessment/Plan:     * Tonic clonic seizures  Pt is 71M pmh prior R hemispheric stroke who presents today with two witnessed seizures per wife and EMS today. Pt transferred for further evaluation. CTH on workup showed small chronic subdural hygroma with small acute component. NSGY consulted.    Plan:  No neurosurgical intervention indicated  Recommend ICU admit, Neurology consult for seizure workup  Imaging reviewed, small acute component on chronic subdural likely noncontributory, stable on repeat imaging.  Neurosurgery will signoff at this time. Please contact with any further questions or concerns.        Thank you for your consult. I will sign off. Please contact us if you have any additional questions.    Topher Jarvis MD  Neurosurgery  [facility]

## 2022-01-01 NOTE — ASSESSMENT & PLAN NOTE
-Present on admission, repeat pending  -Procal negative  -Afebrile and WBC WNL  -Blood cx from [facility] pending    Resolved

## 2022-01-01 NOTE — PT/OT/SLP EVAL
"Speech Language Pathology Evaluation  Bedside Swallow    Patient Name:  Raleigh Blair   MRN:  [id number]  Admitting Diagnosis: Tonic clonic seizures    Recommendations:     Pt does present with essentially safe swallow function for purees and thin liquids ; however,  given fluctuations with alertness would only offer these consistencies for pleasure at this time and continue with primary means of nutrition/hydration/medication via NG tube.                   General Recommendations:  Dysphagia therapy  Diet recommendations:  Puree (for pleasure + NG), Thin (for pleasure + NG)   Aspiration Precautions: Continue alternate means of nutrition and Puree for pleasure, sips of water for pleasure    General Precautions: Standard,    Communication strategies:  provide increased time to answer    History:     Past Medical History:   Diagnosis Date    Congestive heart failure     Peripheral vascular disease     S/P AKA (above knee amputation)        Past Surgical History:   Procedure Laterality Date    AMPUTATION      HIP SURGERY       Pt is 71M pmh prior R hemispheric stroke who presents today with two witnessed seizures per wife and EMS today. Pt transferred for further evaluation. CTH on workup showed small chronic subdural hygroma with small acute component.    Social History: Patient lives with spouse     Prior Intubation HX:  None this admission     Modified Barium Swallow: none documented prior despite past history of stroke     Prior diet: unknown, will continue to follow up re: baseline diet in future therapy sessions with family at the bedside once pt more alert     Occupation/hobbies/homemaking: TBD    Subjective     "hold my hand" Pt lethargic but cooperative  RN aware of and in agreement with assessment at this time     Pain/Comfort:  · Pain Rating 1: 0/10  · Pain Rating Post-Intervention 1: 0/10    Respiratory Status: Nasal cannula, flow 3 L/min    Objective:     Oral Musculature Evaluation  · Oral Musculature: " unable to assess due to poor participation/comprehension  · Dentition: scattered dentition  · Oral Labial Strength and Mobility:  (baseline right sided facial droop)  · Volitional Cough: unable to elicit  · Volitional Swallow: unable to elicit  · Voice Prior to PO Intake: clear; strained and hoarse likely related to baseline dysarthria    Bedside Swallow Eval:   Consistencies Assessed:  · Thin liquids small single sips from open cup x5, single straw sips x3  · Puree full tsp x4     Oral Phase:   · WFL for oral acceptance and containment     Pharyngeal Phase:   · no overt clinical signs/symptoms of aspiration   · No wet vocal quality or coughing appreciated post PO trials  · Confusion and poor alertness impacting swallow safety at this time     Compensatory Strategies  · None    Treatment:  Education provided to Pt re: SLP role in acute care setting, overall impressions and therapeutic goals. Whiteboard updated.    Pt seen for initial swallow assessment. Pt already with NG tube in place. Pt drowsy and confused.Vocal quality is clear but with strained and dysarthric speech likely related to past CVAs. Pt does present with essentially safe swallow function for purees and thin liquids ; however,  given fluctuations with alertness would only offer these consistencies for pleasure at this time and continue with primary means of nutrition/hydration/medication via NG tube. No family at the bedside and SLP updated whiteboard and discussed recs with RN . Speech to continue to follow.     Assessment:     Raleigh Blair is a 71 y.o. male with an SLP diagnosis of Dysphagia.   Goals:   Multidisciplinary Problems     SLP Goals        Problem: SLP Goal    Goal Priority Disciplines Outcome   SLP Goal     SLP    Description: Speech Language Pathology Goals  Goals expected to be met by 1/8/22    1. Pt will tolerate diet of  thin liqiuds and purees,without overt clinical signs of aspiration   2. Pt will participate in ongoing  assessment of swallow function to help determine least restrictive diet                   Plan:     · Patient to be seen:  4 x/week   · Plan of Care expires:     · Plan of Care reviewed with:  patient   · SLP Follow-Up:  Yes       Discharge recommendations:   (TBD)   Barriers to Discharge:  TBD    Time Tracking:     SLP Treatment Date:   01/01/22  Speech Start Time:  0843  Speech Stop Time:  0851     Speech Total Time (min):  8 min    Billable Minutes: Eval Swallow and Oral Function 8    01/01/2022

## 2022-01-01 NOTE — PROVIDER TRANSFER
Neuro Critical Care Transfer of Care note    Date of Admit: 12/31/2021  Date of Transfer / Stepdown: 1/1/2022    Brief History of Present Illness:    Mr. Blair is a 70 y/o gentleman w/ PMHx CHF, PVD s/p L AKA w/ chronically non-healing wounds, prior CVAs, hypothyroidism, DM presenting s/p witnessed tonic-clonic activity x2 yesterday. Head imaging at OSH revealing of a very small acute on chronic R sided SDH w/o mass effect. He was given versed 2.5 mg IV and keppra 3 g IV x1 at OSH.   Suspect seizures related to either SDH vs localization related epilepsy given clearly visible multiple prior cortical infarcts on CT head. On exam patient is easily arousable, able to state his name, no other comprehensible speech and not following commands. Spontaneously AG in RUE/RLE, LUE contracted, LLE mute and s/p AKA w/ visualized wound at distal end of stump. Unclear neurologic baseline. Not febrile, lactate cleared, low clinical suspicion for CNS infection at this time. Will start keppra 1 g BID, restart home meds. Overnight EEG negative and subsequently discontinued. KUB yesterday w/ large stool burden, abdomen distended, firm, however no guarding/rebound tenderness, non-tender to palpation. Aggressive bowel regimen initiated, and the patient has since had a BM.          Hospital Course By Problem with Pertinent Findings:   Neuro  * Tonic clonic seizures  70 y/o M with new onset tonic clonic seizure-like activity of the R side witnessed by both his wife and EMS. Given 2.5 versed en route to [facility] and loaded with 3 g keppra.   -cEEG negative 1/1, discontinue  -No prior history of seizures   -Keppra 1 g BID            Acute on chronic intracranial subdural hematoma  -Admit to [facility], [facility] following  -q1h neuro checks, vital checks  -EKG, ECHO, CXR  -A1c, TSH, lipid panel, coag panel  -Daily CBC, CMP, mag, phos  -SBP < 160, prn labetalol and hydralazine  -SCDs, hold DVT chemoppx in setting of acute bleed  -Hold ASA  -CT 8 mm AoC SDH  along the R posterior cerebral convexity with localized mass effect but no significant MLS  -Keppra 1 g bid   -PT/OT/SLP  -Repeat CT stable     History of CVA with residual deficit  CTH with multifocal large remote appearing infarcts, R>L with generalized cerebral volume loss and chronic ischemic changes  Statin  Hold ASA in setting of AoC SDH  q1h neuro checks  PT/OT/SLP  Repeat CT stable        Cardiac/Vascular  Peripheral vascular disease  statin     Hyperlipidemia  Resume home statin           Essential hypertension  SBP < 160  Resume home carvedilol  Add Norvasc 01/01        CHF (congestive heart failure)  -History of CHF per chart review, no ECHO available  -ECHO, CXR   -resume home digoxin, digoxin level 0.6     Endocrine  Type II DM  -A1c 7.1 on admission  -SSI    Hypothyroidism  TSH 54, free T4 0.48 on admission  Resume home synthroid     Orthopedic  S/P AKA (above knee amputation) unilateral, left  History of L AKA with poor wound healing  -wound care consult  -currently afebrile with WBC WNL, bcx from [facility] pending     Other  Elevated lactic acid level  -Present on admission, repeat pending  -Procal negative  -Afebrile and WBC WNL  -Blood cx from [facility] pending     Resolved        Consultants and Procedures:     Consultants:  Neurosurgery  Wound Care  Nutrition    Procedures:    EEG    Transfer Information:     Diet:  Tube feedings/formulas [facility]: [facility] 1.5 Pasha; NG; Tube Feeding Bag    Physical Activity:  As tolerated.    To Do / Pending Studies / Follow ups:  Blood cultures  Wound Care  ECHO    Ladan Moyer PA-C  Neuro Crtical Care

## 2022-01-01 NOTE — CONSULTS
"[facility]  Adult Nutrition  Consult Note    SUMMARY     Recommendations    1. As medically able, ADAT to diabetic with texture per SLP.    - Suggest adding Santiago BID x 2 weeks for wound healing.    2. If TF warranted, recommend TF of Isosource 1.5 advancing as tolerated to goal rate of 45 mL/hr to provide 1620 kcal, 73 gm protein, and 825 mL free fluid.     3. Suggest adding MVI, vitamin C, and zinc to promote skin integrity.     4. RD following.     Goals: receive nutrition by RD follow-up  Nutrition Goal Status: new  Communication of RD Recs:  (POC)    Assessment and Plan    Nutrition Problem  Inadequate Energy Intake    Related to (etiology):   Inability to consume sufficient energy     Signs and Symptoms (as evidenced by):   NPO with no alternative means of nutrition      Intervention (treatment strategy):  Collaboration of nutrition care with other providers    Nutrition Diagnosis Status:   New    Reason for Assessment    Reason For Assessment: consult  Diagnosis:  (Tonic clonic seizures)  Relevant Medical History: CHF, PVD s/p L AKA with chronically non-healing wounds, prior CVAs, DM  Interdisciplinary Rounds: did not attend  General Information Comments: Pt currently NPO, seen by SLP this AM. Noted pt drowsy and confused. NG tube present. Unable to assess appetite PTA. Per chart review, pt with no wt loss. Will continue to monitor energy intake and wt changes.   Nutrition Discharge Planning: pending medical course    Nutrition Risk Screen    Nutrition Risk Screen: no indicators present,dysphagia or difficulty swallowing    Nutrition/Diet History    Factors Affecting Nutritional Intake: NPO    Anthropometrics    Temp: 98 °F (36.7 °C)  Height: 5' 3" (160 cm)  Height (inches): 63 in  Weight Method: Bed Scale  Weight: 75.7 kg (166 lb 14.2 oz)  Weight (lb): 166.89 lb  Ideal Body Weight (IBW), Male: 124 lb  % Ideal Body Weight, Male (lb): 134.59 %  BMI (Calculated): 29.6  BMI Grade: 25 - 29.9 " - overweight     Lab/Procedures/Meds    Pertinent Labs Reviewed: reviewed  Pertinent Labs Comments: glucose 121, phos 1.8  Pertinent Medications Reviewed: reviewed  Pertinent Medications Comments: statin, levothyroxine, polyethylene glycol, senna-docusate, IVF     Estimated/Assessed Needs    Weight Used For Calorie Calculations: 75.7 kg (166 lb 14.2 oz)  Energy Calorie Requirements (kcal): 1700 kcal/day  Energy Need Method: [place]-St Jeor (x 1.2)  Protein Requirements: 76 gm/day (1.0 gm/kg)  Weight Used For Protein Calculations: 75.7 kg (166 lb 14.2 oz)  Fluid Requirements (mL): 1 mL/kcal or per MD     RDA Method (mL): 1700  CHO Requirement: 212 gm/day    Nutrition Prescription Ordered    Current Diet Order: NPO    Evaluation of Received Nutrient/Fluid Intake    I/O: -582mL since admit   Comments: LBM 12/31  % Intake of Estimated Energy Needs: 0 - 25 %  % Meal Intake: NPO    Nutrition Risk    Level of Risk/Frequency of Follow-up:  (f/u 1 x wk)       Monitor and Evaluation    Food and Nutrient Intake: energy intake  Food and Nutrient Adminstration: diet order,enteral and parenteral nutrition administration  Anthropometric Measurements: weight,weight change,body mass index  Biochemical Data, Medical Tests and Procedures: electrolyte and renal panel,gastrointestinal profile,glucose/endocrine profile,inflammatory profile,lipid profile  Nutrition-Focused Physical Findings: overall appearance       Nutrition Follow-Up    RD Follow-up?: Yes

## 2022-01-01 NOTE — PLAN OF CARE
[facility] Care Plan    POC reviewed with Raleigh Blair at 0300. Pt unable to verbalize understanding. No acute events overnight. Pt progressing toward goals. Will continue to monitor. See below and flowsheets for full assessment and VS info.       Neuro:  [place] Coma Scale  Best Eye Response: 3-->(E3) to speech  Best Motor Response: 6-->(M6) obeys commands  Best Verbal Response: 2-->(V2) incomprehensible speech  [place] Coma Scale Score: 11  Assessment Qualifiers: patient not sedated/intubated  Pupil PERRLA: yes     24hr Temp:  [97.6 °F (36.4 °C)-99 °F (37.2 °C)]     CV:   Rhythm: normal sinus rhythm  BP goals:   SBP < 160  MAP > 65    Resp:   O2 Device (Oxygen Therapy): nasal cannula       Plan: N/A    GI/:     Diet/Nutrition Received: NPO  Last Bowel Movement: 12/31/21  Voiding Characteristics: due to void    Intake/Output Summary (Last 24 hours) at 1/1/2022 0644  Last data filed at 12/31/2021 1801  Gross per 24 hour   Intake 67.16 ml   Output --   Net 67.16 ml          Labs/Accuchecks:  Recent Labs   Lab 01/01/22  0256   WBC 11.52   RBC 3.79*   HGB 11.4*   HCT 35.5*         Recent Labs   Lab 01/01/22  0256      K 3.6   CO2 27      BUN 15   CREATININE 1.1   ALKPHOS 93   ALT 13   AST 18   BILITOT 0.3      Recent Labs   Lab 12/31/21  1019   INR 1.1   APTT 29.3      Recent Labs   Lab 12/31/21  1002      TROPONINI <0.006       Electrolytes: N/A - electrolytes WDL  Accuchecks: Q6H    Gtts:   sodium chloride 0.9% 50 mL/hr at 12/31/21 1801       LDA/Wounds:  Lines/Drains/Airways       Peripherally Inserted Central Catheter Line              PICC Single Lumen right brachial -- days              Drain                   NG/OG Tube 12/31/21 1345 Right nostril <1 day    Male External Urinary Catheter 12/31/21 1345 Small <1 day              Peripheral Intravenous Line                   Peripheral IV - Single Lumen 12/31/21 0847 20 G Left;Posterior Hand <1 day                  Wounds: Yes  Wound care  consulted: Yes

## 2022-01-01 NOTE — PLAN OF CARE
Pt seen for initial swallow assessment. Pt already with NG tube in place. Pt drowsy and confused.Vocal quality is clear but with strained and dysarthric speech likely related to past CVAs. Pt does present with essentially safe swallow function for purees and thin liquids ; however,  given fluctuations with alertness would only offer these consistencies for pleasure at this time and continue with primary means of nutrition/hydration/medication via NG tube. No family at the bedside and SLP updated whiteboard and discussed recs with RN . Speech to continue to follow.     Landy Rodríguez MS, CCC-SLP  Speech Language Pathologist  Pager: [phone]  Date 1/1/2022

## 2022-01-01 NOTE — HPI
Pt is 71M pmh prior R hemispheric stroke who presents today with two witnessed seizures per wife and EMS today. Pt transferred for further evaluation. CTH on workup showed small chronic subdural hygroma with small acute component. NSGY consulted.

## 2022-01-01 NOTE — PT/OT/SLP EVAL
Physical Therapy Evaluation and Treatment    Patient Name:  Raleigh Blair   MRN:  [id number]    Recommendations:     Discharge Recommendations:  nursing facility, skilled (pending confirmation of previous functional level)   Discharge Equipment Recommendations:  (TBD)   Barriers to discharge: Increased level of assist    Assessment:     Raleigh Blair is a 71 y.o. male admitted with a medical diagnosis of Tonic clonic seizures.  He presents with the following impairments/functional limitations:  weakness,impaired endurance,impaired self care skills,impaired functional mobilty,impaired balance,impaired cognition,decreased upper extremity function,decreased lower extremity function,decreased safety awareness,decreased ROM,impaired cardiopulmonary response to activity,impaired muscle length. These deficits affect their roles and responsibilities in which they were able to complete prior to admit. Raleigh Blair would benefit from acute PT intervention to improve quality of life and focus on recovery of impairments. Once medically stable, recommending pt discharge to Skilled Nursing Facility. Patient AxOx1 this visit and intermittently speaking garbled French and English. Follows limited commands. Requires maximal-total assist x2 persons for bed mobility and to sit edge of bed. RN present throughout to assist.    Rehab Prognosis: Fair; patient would benefit from acute skilled PT services 3 x/week to address these deficits and reach maximum level of function. Patient is most appropriate to go to nursing facility, skilled (pending confirmation of previous functional level).  Recent Surgery: * No surgery found *      Plan:     During this hospitalization, patient to be seen 3 x/week to address the identified rehab impairments via therapeutic activities,therapeutic exercises,neuromuscular re-education and progress toward the following goals:    · Plan of Care Expires:  02/01/22    Subjective     Chief Complaint: None  "verbalized   Patient/Family Comments/Goals: "I love you", "da me un mano"  Pain/Comfort:  · Pain Rating 1: 0/10  · Pain Rating Post-Intervention 1: 0/10    Patients cultural, spiritual, Mormonism conflicts given the current situation: no    Living Environment:  Unable to assess due to confusion. Per chart review, patient appears to live with his wife. Attempted to call patient's spouse from number on facesheet, unsuccessful.   Patient uses DME as follows:  (unknown). Upon discharge, patient will have assistance from unknown.    Objective:     Communicated with nursing prior to session.  Patient found HOB elevated with Condom Catheter,peripheral IV,blood pressure cuff,pulse ox (continuous),telemetry,NG tube,restraints upon PT entry to room.    General Precautions: Standard, fall   Orthopedic Precautions:N/A   Braces: N/A    Exams:  · Cognitive Exam:  Patient is oriented to Person, Not oriented to place (reports he is at home), Not oriented to time, Not oriented to situation, follows commands 25% of the time, intermittently speaking garbled Maori and English  · RLE ROM: WFL  · RLE Strength: unable to fully assess due to impaired command following, 4/5 dorsiflexion and moves RLE spontaneously against gravity  · LLE ROM: WFL  · LLE Strength: moves residual limb against gravity  · Sensation: unable to assess due to confusion    Functional Mobility:  · Bed Mobility:     · Rolling Left:  maximal assistance of 2 persons  · Rolling Right: total assistance of 2 persons  · Scooting: maximal assistance of 2 persons  · Supine to Sit: total assistance of 2 persons  · Sit to Supine: maximal assistance of 2 persons  · Transfers:  Deferred due to poor sitting balance   · Balance:   · Static Sitting: Poor, able to maintain for 5 minute(s) with moderate - total assistance, demonstrates significant posterior and left lean minimally corrected with cuing to orient to midline  · Dynamic Sitting: Poor: Patient unable to accept challenge " or move without loss of balance, total assistance    Therapeutic Activities and Exercises:  Patient educated on role of acute care PT and PT POC   Patient encountered with soiled moshe pad, extra time spent changing pads  Patient intermittently attempting to pull at NG tube, educated on importance of not doing so, no evidence of learning    AM-PAC 6 CLICK MOBILITY  Total Score:8     Patient left HOB elevated with all lines intact, call button in reach, RN present and restraints reapplied at end of session.    GOALS:   Multidisciplinary Problems     Physical Therapy Goals        Problem: Physical Therapy Goal    Goal Priority Disciplines Outcome Goal Variances Interventions   Physical Therapy Goal     PT, PT/OT Ongoing, Progressing     Description: Goals to be met by: 1/15/2022     Patient will increase functional independence with mobility by performin. Supine to sit with minimum assistance  2. Sit to supine with minimum assistance  3. Roll to right with minimal assistance  4. Sit to stand transfer with maximal assistance  5. Bed to chair transfer with maximal assistance using LRAD as needed  6. Sitting at edge of bed x5 minutes with Minimal Assistance  7. Lower extremity exercise program x10 reps per handout, with independence                    History:     Past Medical History:   Diagnosis Date    Congestive heart failure     Peripheral vascular disease     S/P AKA (above knee amputation)        Past Surgical History:   Procedure Laterality Date    AMPUTATION      HIP SURGERY         Time Tracking:     PT Received On: 22  PT Start Time: 1029     PT Stop Time: 1044  PT Total Time (min): 15 min     Billable Minutes: Evaluation 7 min Therapeutic Activity 8 min    2022

## 2022-01-01 NOTE — ASSESSMENT & PLAN NOTE
Pt is 71M pmh prior R hemispheric stroke who presents today with two witnessed seizures per wife and EMS today. Pt transferred for further evaluation. CTH on workup showed small chronic subdural hygroma with small acute component. NSGY consulted.    Plan:  No neurosurgical intervention indicated  Recommend ICU admit, Neurology consult for seizure workup  Imaging reviewed, small acute component on chronic subdural likely noncontributory, stable on repeat imaging.  Neurosurgery will signoff at this time. Please contact with any further questions or concerns.

## 2022-01-01 NOTE — SUBJECTIVE & OBJECTIVE
Medications Prior to Admission   Medication Sig Dispense Refill Last Dose    acetaminophen (TYLENOL) 500 MG tablet Take 1,000 mg by mouth 2 (two) times daily as needed for Pain.       ascorbic acid, vitamin C, (VITAMIN C) 1000 MG tablet Take 1,000 mg by mouth once daily.       atorvastatin (LIPITOR) 20 MG tablet Take 20 mg by mouth nightly.       bisacodyL (DULCOLAX) 5 mg EC tablet Take 5 mg by mouth daily as needed for Constipation.       carvediloL (COREG) 12.5 MG tablet Take 12.5 mg by mouth 2 (two) times daily.       cholecalciferol, vitamin D3, 125 mcg (5,000 unit) Tab Take 5,000 Units by mouth once a week.       cyanocobalamin 500 MCG tablet Take 500 mcg by mouth once daily.       digoxin (LANOXIN) 125 mcg tablet Take 0.125 mg by mouth once daily.       DULoxetine (CYMBALTA) 60 MG capsule Take 60 mg by mouth 2 (two) times daily.       gabapentin (NEURONTIN) 100 MG capsule Take 3 capsules (300 mg total) by mouth 3 (three) times daily. 270 capsule 3     levothyroxine (SYNTHROID) 25 MCG tablet Take 25 mcg by mouth once daily.       mupirocin (BACTROBAN) 2 % ointment Apply topically once daily. (Patient not taking: Reported on 12/31/2021) 30 g 2     OLANZapine (ZYPREXA) 2.5 MG tablet Take 2.5 mg by mouth once daily.       omega-3 fatty acids 500 mg Cap Take 1 capsule by mouth once daily.       sodium chloride 0.9 % PgBk 100 mL with ertapenem 1 gram SolR 1 g Inject 1 g into the vein.       tamsulosin (FLOMAX) 0.4 mg Cap Take 1 capsule by mouth nightly.          Review of patient's allergies indicates:  No Known Allergies    Past Medical History:   Diagnosis Date    Congestive heart failure     Peripheral vascular disease     S/P AKA (above knee amputation)      Past Surgical History:   Procedure Laterality Date    AMPUTATION      HIP SURGERY       Family History     Problem Relation (Age of Onset)    Heart disease Father        Tobacco Use    Smoking status: Current Every Day Smoker      Packs/day: 0.25    Smokeless tobacco: Current User   Substance and Sexual Activity    Alcohol use: Yes     Comment: occassionally    Drug use: Never    Sexual activity: Not Currently     Review of Systems   Unable to perform ROS: Mental status change     Objective:     Weight: 75.7 kg (166 lb 14.2 oz)  Body mass index is 29.56 kg/m².  Vital Signs (Most Recent):  Temp: 97.6 °F (36.4 °C) (12/31/21 1501)  Pulse: 71 (12/31/21 1801)  Resp: (!) 21 (12/31/21 1801)  BP: (!) 154/69 (12/31/21 1801)  SpO2: 99 % (12/31/21 1801) Vital Signs (24h Range):  Temp:  [97.6 °F (36.4 °C)-99 °F (37.2 °C)] 97.6 °F (36.4 °C)  Pulse:  [] 71  Resp:  [10-21] 21  SpO2:  [97 %-100 %] 99 %  BP: (110-169)/() 154/69     Date 12/31/21 0700 - 01/01/22 0659   Shift 1500-1782 8777-2467 5293-1147 24 Hour Total   INTAKE   I.V.(mL/kg)  67.2(0.9)  67.2(0.9)   Shift Total(mL/kg)  67.2(0.9)  67.2(0.9)   OUTPUT   Shift Total(mL/kg)       Weight (kg) 75.7 75.7 75.7 75.7                        NG/OG Tube 12/31/21 1345 Right nostril (Active)       Male External Urinary Catheter 12/31/21 1345 Small (Active)       Neurosurgery Physical Exam  [id number]  Awake, Singaporean speaking, confused, Oriented to self only with dysarthria  PERRL, CN grossly intact  FC right side, paretic left with left leg amputation      Significant Labs:  Recent Labs   Lab 12/31/21  1002   *      K 4.8      CO2 24   BUN 19   CREATININE 1.6*   CALCIUM 9.8     Recent Labs   Lab 12/31/21  1002 12/31/21  1909   WBC 12.22  --    HGB 11.2*  --    HCT 35.9* 34*     --      Recent Labs   Lab 12/31/21  1019   INR 1.1   APTT 29.3     Microbiology Results (last 7 days)     ** No results found for the last 168 hours. **        All pertinent labs from the last 24 hours have been reviewed.    Significant Diagnostics:  I have reviewed all pertinent imaging results/findings within the past 24 hours.  I have reviewed and interpreted all pertinent imaging results/findings  within the past 24 hours.

## 2022-01-01 NOTE — PLAN OF CARE
Hospital Medicine ICU Acceptance Note    Date of Admit: 12/31/2021  Date of Transfer / Stepdown: 1/1/2022  Bopaul, C/J, L, Onc (IV chemo w/in 1 month), Gyn/Onc, or other special case?: no   ICU team stepping patient down: NCC  Accepting  team: MICHAEL    Brief History of Present Illness:      Mr. Blair is a 72 yo M with PMHx of PVD, CHF, and recent L AKA who presents to [facility] for new onset seizures and an AoC SDH. He originally presented to to the ED at [facility] via EMS with the complaint of seizure. Per his wife, the patient was in his normal state health yesterday and this morning.  She heard the bed shaking and found her  kicking his right leg and shaking his right arm in a manner most consistent with a tonic-clonic seizure approximately 1 hour prior to arrival.  Patient's wife states that she called EMS and upon EMS arrival patient was awake but postictal.  He was then being transported to the ambulance where he reportedly had another seizure and was given 2.5 mg of Versed.  Per chart review, the patient's wife states he is currently receiving antibiotics via a PICC for infected left AKA. She also denied any history of seizures or recent illness. Of note, the patient had a stroke approximately 10 years ago  for which he has residual deficit on the left side of his body and takes daily ASA. He was loaded with 3 g keppra prior to transfer to [facility].     He is being admitted to [facility] for hourly neuromonitoring and a higher level of care.     Hospital/ICU Course:     01/01/2022: EEG negative. Discontinue. Add Norvasc and SQH. Start TF. Started on Keppra, Step down orders to hospital medicine placed.        Consultants and Procedures:     Consultants:  [facility]    Procedures:    EEG    Transfer Information:     Diet:  Ordered    Physical Activity:  OT/PT Ordered    To Do / Pending Studies / Follow ups:  - Continue keprra 1g BID  - Stable SDH, gollow up final recs regarding AC  - Trend lactate per last NCC  note    Patient has been accepted by Hospital Medicine Team Z, who will assume care of the patient upon arrival to the floor from the ICU. Please contact ICU team with any concerns prior to arrival. Please contact Hospital Medicine at [phone] (please do NOT leave a voicemail) when patient arrives to the floor.    Christian Zhou M.D.  Department of Hospital Medicine  [facility]  Pager: [phone]  Spect: [phone]

## 2022-01-01 NOTE — ASSESSMENT & PLAN NOTE
70 y/o M with new onset tonic clonic seizure-like activity of the R side witnessed by both his wife and EMS. Given 2.5 versed en route to [facility] and loaded with 3 g keppra.   -cEEG negative 1/1, discontinue  -No prior history of seizures   -Keppra 1 g BID

## 2022-01-01 NOTE — PLAN OF CARE
PT eval complete, plan of care established    2022    Problem: Physical Therapy Goal  Goal: Physical Therapy Goal  Description: Goals to be met by: 1/15/2022     Patient will increase functional independence with mobility by performin. Supine to sit with minimum assistance  2. Sit to supine with minimum assistance  3. Roll to right with minimal assistance  4. Sit to stand transfer with maximal assistance  5. Bed to chair transfer with maximal assistance using LRAD as needed  6. Sitting at edge of bed x5 minutes with Minimal Assistance  7. Lower extremity exercise program x10 reps per handout, with independence   Outcome: Ongoing, Progressing

## 2022-01-01 NOTE — PROCEDURES
[facility] EEG/VIDEO MONITORING REPORT  Raleigh Blair  [id number]  1950    DATE OF SERVICE:  12/31/2021-01/01/2022  DATE OF ADMISSION: 12/31/2021  1:37 PM    ADMITTING/REQUESTING PROVIDER: Lupe Starr MD    REASON FOR CONSULT:  71-year-old man with multiple complicated medical comorbidities including multiple large strokes bilaterally and a right-sided subdural hematoma admitted after episodes of decreased responsiveness with abnormal movements.  Evaluate for evidence of epileptiform activity.    METHODOLOGY   Electroencephalographic (EEG) recording is with electrodes placed according to the International 10-20 placement system.  Thirty two (32) channels of digital signal (sampling rate of 512/sec) including T1 and T2 was simultaneously recorded from the scalp and may include  EKG, EMG, and/or eye monitors.  Recording band pass was 0.1 to 512 hz.  Digital video recording of the patient is simultaneously recorded with the EEG.  The patient is instructed report clinical symptoms which may occur during the recording session.  EEG and video recording is stored and archived in digital format.  Activation procedures which include photic stimulation, hyperventilation and instructing patients to perform simple task are done in selected patients.   The EEG is displayed on a monitor screen and can be reviewed using different montages.  Computer assisted analysis is employed to detect spike and electrographic seizure activity.   The entire record is submitted for computer analysis.  The entire recording is visually reviewed and the times identified by computer analysis as being spikes or seizures are reviewed again.  Compresses spectral analysis (CSA) is also performed on the activity recorded from each individual channel.  This is displayed as a power display of frequencies from 0 to 30 Hz over time.   The CSA is reviewed looking for asymmetries in power between homologous areas of the scalp and then compared with the  original EEG recording.     [employer] software is also utilized in the review of this study.  This software suite analyzes the EEG recording in multiple domains.  Coherence and rhythmicity is computed to identify EEG sections which may contain organized seizures.  Each channel undergoes analysis to detect presence of spike and sharp waves which have special and morphological characteristic of epileptic activity.  The routine EEG recording is converted from spacial into frequency domain.  This is then displayed comparing homologous areas to identify areas of significant asymmetry.  Algorithm to identify non-cortically generated artifact is used to separate eye movement, EMG and other artifact from the EEG.      RECORDING TIMES  Start on 12/31/2021 at 16:46 p.m.  Stop on 01/01/2022 at 09:03 a.m.  A total of 16 hours and 17 minutes of EEG recording is obtained.    EEG FINDINGS  Background activity:   Within the limitation of a significant amount of movement/lead artifact on an electrode cap EEG, the background is continuous, disorganized, mixed frequency theta/delta activity with slightly higher frequencies seen over the left hemisphere with a left posterior dominant rhythm that gets up to 6.5 hz.  There is more prominent theta/delta slowing over the right hemisphere with a less well-formed posterior dominant rhythm.  Rarely, over the right temporal leads, there are runs of lateralized rhythmic delta activity.    There are no pushbutton activations.    Sleep:  There are periods with poorly formed sleep architecture.    Activation procedures:   Hyperventilation is not performed  Photic stimulation is not performed    Cardiac Monitor:   Electrode caps do not have EKG capabilities    Impression:   Within the limitation of a significant amount of lead/movement artifact, this is an abnormal electrode cap EEG monitoring study because of rare runs of rhythmic delta activity over the right temporal leads suggesting focal  cortical dysfunction and possibly a seizure focus in this region.  There is generalized background slowing consistent with diffuse cortical dysfunction and a moderate encephalopathy with somewhat more prominent focal slowing over the right hemisphere.  There are no pushbutton activations.  There are no electrographic seizures.  Depending on the clinical scenario, consider additional monitoring with standard scalp electrodes.    Rosalinda Bassett MD PhD  Neurology-Epilepsy  [facility].

## 2022-01-02 PROBLEM — E11.9 TYPE 2 DIABETES MELLITUS: Status: ACTIVE | Noted: 2022-01-02

## 2022-01-02 LAB
ALBUMIN SERPL BCP-MCNC: 3.6 G/DL (ref 3.5–5.2)
ALP SERPL-CCNC: 97 U/L (ref 55–135)
ALT SERPL W/O P-5'-P-CCNC: 14 U/L (ref 10–44)
ANION GAP SERPL CALC-SCNC: 11 MMOL/L (ref 8–16)
AST SERPL-CCNC: 17 U/L (ref 10–40)
BASOPHILS # BLD AUTO: 0.08 K/UL (ref 0–0.2)
BASOPHILS NFR BLD: 0.7 % (ref 0–1.9)
BILIRUB SERPL-MCNC: 0.4 MG/DL (ref 0.1–1)
BUN SERPL-MCNC: 18 MG/DL (ref 8–23)
CALCIUM SERPL-MCNC: 9.6 MG/DL (ref 8.7–10.5)
CHLORIDE SERPL-SCNC: 104 MMOL/L (ref 95–110)
CO2 SERPL-SCNC: 28 MMOL/L (ref 23–29)
CREAT SERPL-MCNC: 1.4 MG/DL (ref 0.5–1.4)
DIFFERENTIAL METHOD: ABNORMAL
EOSINOPHIL # BLD AUTO: 0.3 K/UL (ref 0–0.5)
EOSINOPHIL NFR BLD: 2.7 % (ref 0–8)
ERYTHROCYTE [DISTWIDTH] IN BLOOD BY AUTOMATED COUNT: 16.2 % (ref 11.5–14.5)
EST. GFR  (AFRICAN AMERICAN): 58.8 ML/MIN/1.73 M^2
EST. GFR  (NON AFRICAN AMERICAN): 50.9 ML/MIN/1.73 M^2
GLUCOSE SERPL-MCNC: 115 MG/DL (ref 70–110)
HCT VFR BLD AUTO: 35.5 % (ref 40–54)
HGB BLD-MCNC: 11 G/DL (ref 14–18)
IMM GRANULOCYTES # BLD AUTO: 0.05 K/UL (ref 0–0.04)
IMM GRANULOCYTES NFR BLD AUTO: 0.5 % (ref 0–0.5)
LYMPHOCYTES # BLD AUTO: 2.5 K/UL (ref 1–4.8)
LYMPHOCYTES NFR BLD: 22.8 % (ref 18–48)
MAGNESIUM SERPL-MCNC: 2.6 MG/DL (ref 1.6–2.6)
MCH RBC QN AUTO: 30.1 PG (ref 27–31)
MCHC RBC AUTO-ENTMCNC: 31 G/DL (ref 32–36)
MCV RBC AUTO: 97 FL (ref 82–98)
MONOCYTES # BLD AUTO: 0.9 K/UL (ref 0.3–1)
MONOCYTES NFR BLD: 8.1 % (ref 4–15)
NEUTROPHILS # BLD AUTO: 7.1 K/UL (ref 1.8–7.7)
NEUTROPHILS NFR BLD: 65.2 % (ref 38–73)
NRBC BLD-RTO: 0 /100 WBC
PHOSPHATE SERPL-MCNC: 3.2 MG/DL (ref 2.7–4.5)
PLATELET # BLD AUTO: 304 K/UL (ref 150–450)
PMV BLD AUTO: 9.1 FL (ref 9.2–12.9)
POCT GLUCOSE: 122 MG/DL (ref 70–110)
POTASSIUM SERPL-SCNC: 4.6 MMOL/L (ref 3.5–5.1)
PROT SERPL-MCNC: 7.3 G/DL (ref 6–8.4)
RBC # BLD AUTO: 3.66 M/UL (ref 4.6–6.2)
SODIUM SERPL-SCNC: 143 MMOL/L (ref 136–145)
WBC # BLD AUTO: 10.89 K/UL (ref 3.9–12.7)

## 2022-01-02 PROCEDURE — 85025 COMPLETE CBC W/AUTO DIFF WBC: CPT

## 2022-01-02 PROCEDURE — 20000000 HC ICU ROOM

## 2022-01-02 PROCEDURE — 94761 N-INVAS EAR/PLS OXIMETRY MLT: CPT

## 2022-01-02 PROCEDURE — 97165 OT EVAL LOW COMPLEX 30 MIN: CPT

## 2022-01-02 PROCEDURE — 25000003 PHARM REV CODE 250

## 2022-01-02 PROCEDURE — 63600175 PHARM REV CODE 636 W HCPCS

## 2022-01-02 PROCEDURE — 99233 SBSQ HOSP IP/OBS HIGH 50: CPT | Mod: ,,,

## 2022-01-02 PROCEDURE — 80053 COMPREHEN METABOLIC PANEL: CPT

## 2022-01-02 PROCEDURE — 27000221 HC OXYGEN, UP TO 24 HOURS

## 2022-01-02 PROCEDURE — 83735 ASSAY OF MAGNESIUM: CPT

## 2022-01-02 PROCEDURE — 84100 ASSAY OF PHOSPHORUS: CPT

## 2022-01-02 PROCEDURE — 94667 MNPJ CHEST WALL 1ST: CPT

## 2022-01-02 PROCEDURE — 99233 PR SUBSEQUENT HOSPITAL CARE,LEVL III: ICD-10-PCS | Mod: ,,,

## 2022-01-02 PROCEDURE — 97530 THERAPEUTIC ACTIVITIES: CPT

## 2022-01-02 PROCEDURE — 25000003 PHARM REV CODE 250: Performed by: PSYCHIATRY & NEUROLOGY

## 2022-01-02 RX ORDER — CARVEDILOL 6.25 MG/1
6.25 TABLET ORAL 2 TIMES DAILY
Status: DISCONTINUED | OUTPATIENT
Start: 2022-01-02 | End: 2022-01-02

## 2022-01-02 RX ORDER — AMOXICILLIN 250 MG
1 CAPSULE ORAL 2 TIMES DAILY
Status: DISCONTINUED | OUTPATIENT
Start: 2022-01-02 | End: 2022-01-04

## 2022-01-02 RX ORDER — OLANZAPINE 2.5 MG/1
2.5 TABLET ORAL DAILY
Status: DISCONTINUED | OUTPATIENT
Start: 2022-01-03 | End: 2022-01-05 | Stop reason: HOSPADM

## 2022-01-02 RX ORDER — POLYETHYLENE GLYCOL 3350 17 G/17G
17 POWDER, FOR SOLUTION ORAL DAILY
Status: DISCONTINUED | OUTPATIENT
Start: 2022-01-03 | End: 2022-01-04

## 2022-01-02 RX ORDER — DIGOXIN 125 MCG
0.12 TABLET ORAL DAILY
Status: DISCONTINUED | OUTPATIENT
Start: 2022-01-03 | End: 2022-01-05 | Stop reason: HOSPADM

## 2022-01-02 RX ORDER — LEVOTHYROXINE SODIUM 25 UG/1
25 TABLET ORAL
Status: DISCONTINUED | OUTPATIENT
Start: 2022-01-03 | End: 2022-01-04

## 2022-01-02 RX ORDER — SILODOSIN 4 MG/1
4 CAPSULE ORAL DAILY
Status: DISCONTINUED | OUTPATIENT
Start: 2022-01-03 | End: 2022-01-05 | Stop reason: HOSPADM

## 2022-01-02 RX ORDER — ATORVASTATIN CALCIUM 20 MG/1
20 TABLET, FILM COATED ORAL DAILY
Status: DISCONTINUED | OUTPATIENT
Start: 2022-01-03 | End: 2022-01-05 | Stop reason: HOSPADM

## 2022-01-02 RX ORDER — CARVEDILOL 6.25 MG/1
6.25 TABLET ORAL 2 TIMES DAILY
Status: DISCONTINUED | OUTPATIENT
Start: 2022-01-02 | End: 2022-01-03

## 2022-01-02 RX ORDER — GABAPENTIN 250 MG/5ML
300 SOLUTION ORAL EVERY 8 HOURS
Status: DISCONTINUED | OUTPATIENT
Start: 2022-01-02 | End: 2022-01-05 | Stop reason: HOSPADM

## 2022-01-02 RX ADMIN — AMLODIPINE BESYLATE 5 MG: 5 TABLET ORAL at 09:01

## 2022-01-02 RX ADMIN — DIGOXIN 0.12 MG: 125 TABLET ORAL at 10:01

## 2022-01-02 RX ADMIN — HEPARIN SODIUM 5000 UNITS: 5000 INJECTION INTRAVENOUS; SUBCUTANEOUS at 09:01

## 2022-01-02 RX ADMIN — SENNOSIDES AND DOCUSATE SODIUM 1 TABLET: 50; 8.6 TABLET ORAL at 09:01

## 2022-01-02 RX ADMIN — CARVEDILOL 6.25 MG: 6.25 TABLET, FILM COATED ORAL at 09:01

## 2022-01-02 RX ADMIN — GABAPENTIN 300 MG: 250 SOLUTION ORAL at 02:01

## 2022-01-02 RX ADMIN — HEPARIN SODIUM 5000 UNITS: 5000 INJECTION INTRAVENOUS; SUBCUTANEOUS at 06:01

## 2022-01-02 RX ADMIN — DULOXETINE 60 MG: 60 CAPSULE, DELAYED RELEASE ORAL at 09:01

## 2022-01-02 RX ADMIN — GABAPENTIN 300 MG: 250 SOLUTION ORAL at 06:01

## 2022-01-02 RX ADMIN — ATORVASTATIN CALCIUM 20 MG: 20 TABLET, FILM COATED ORAL at 09:01

## 2022-01-02 RX ADMIN — HEPARIN SODIUM 5000 UNITS: 5000 INJECTION INTRAVENOUS; SUBCUTANEOUS at 02:01

## 2022-01-02 RX ADMIN — LEVETIRACETAM 1000 MG: 500 TABLET, FILM COATED ORAL at 09:01

## 2022-01-02 RX ADMIN — GABAPENTIN 300 MG: 250 SOLUTION ORAL at 09:01

## 2022-01-02 RX ADMIN — OLANZAPINE 2.5 MG: 2.5 TABLET, FILM COATED ORAL at 09:01

## 2022-01-02 RX ADMIN — POLYETHYLENE GLYCOL 3350 17 G: 17 POWDER, FOR SOLUTION ORAL at 09:01

## 2022-01-02 RX ADMIN — LEVOTHYROXINE SODIUM 25 MCG: 25 TABLET ORAL at 06:01

## 2022-01-02 RX ADMIN — SILODOSIN 4 MG: 4 CAPSULE ORAL at 09:01

## 2022-01-02 NOTE — SUBJECTIVE & OBJECTIVE
Interval History:  Patient with reported waxing and waning difficulty to arouse in conjunction with drops in blood pressure. Decrease coreg and d/c norvasc. Initiate chest PT. Boarding for [facility] medicine.     Review of Systems   Unable to perform ROS: Acuity of condition     Objective:     Vitals:  Temp: 97.8 °F (36.6 °C)  Pulse: (!) 57  Rhythm: sinus bradycardia  BP: 116/88  MAP (mmHg): 93  Resp: 13  SpO2: 98 %  O2 Device (Oxygen Therapy): nasal cannula    Temp  Min: 97.8 °F (36.6 °C)  Max: 98.5 °F (36.9 °C)  Pulse  Min: 51  Max: 68  BP  Min: 90/57  Max: 152/67  MAP (mmHg)  Min: 70  Max: 100  Resp  Min: 7  Max: 22  SpO2  Min: 90 %  Max: 100 %    01/01 0701 - 01/02 0700  In: 797.8 [I.V.:797.8]  Out: -    Unmeasured Output  Urine Occurrence: 1       Physical Exam  Physical Exam  Constitutional: Well-nourished, well-developed. No obvious distress.  Eyes: Clear conjunctiva. Anicteric. No discharge. Lids without lesions.  HEENT: MMM. Nose, external ears atraumatic.  Cardio: RRR. Pulses intact. Capillary refill time < 2 seconds.  Respiratory: Clear to auscultation. Regular effort.  GI: Bowel sounds present. Soft, non-tender.   Extremities: L AKA with wound present     Neurologic:  E4V4M6  More alert today, oriented to self and place   More conversational with some confused speech  Improvement in following commands  PERRL, EOMI  Moves R side spontaneously and AG  LUE no response to painful stimuli (baseline 2/2 to previous stroke)     Medications:  Continuous Scheduledatorvastatin, 20 mg, Daily  carvediloL, 6.25 mg, BID  digoxin, 0.125 mg, Daily  DULoxetine, 60 mg, BID  gabapentin, 300 mg, Q8H  heparin (porcine), 5,000 Units, Q8H  levETIRAcetam, 1,000 mg, BID  levothyroxine, 25 mcg, Before breakfast  OLANZapine, 2.5 mg, Daily  polyethylene glycol, 17 g, Daily  senna-docusate 8.6-50 mg, 1 tablet, BID  silodosin, 4 mg, Daily    PRNdextrose 50%, 12.5 g, PRN  glucagon (human recombinant), 1 mg, PRN  hydrALAZINE, 10 mg, Q6H  PRN  insulin aspart U-100, 1-10 Units, Q6H PRN  labetalol, 10 mg, Q6H PRN  magnesium oxide, 800 mg, PRN  magnesium oxide, 800 mg, PRN  potassium bicarbonate, 35 mEq, PRN  potassium bicarbonate, 50 mEq, PRN  potassium bicarbonate, 60 mEq, PRN  potassium, sodium phosphates, 2 packet, PRN  potassium, sodium phosphates, 2 packet, PRN  potassium, sodium phosphates, 2 packet, PRN  sodium chloride 0.9%, 10 mL, PRN      Today I personally reviewed pertinent medications, lines/drains/airways, imaging, cardiology results, laboratory results, microbiology results, notably:    Diet  Diet NPO  Diet NPO

## 2022-01-02 NOTE — PROGRESS NOTES
[facility] - Neuro Critical Care  Neurocritical Care  Progress Note    Admit Date: 12/31/2021  Service Date: 01/02/2022  Length of Stay: 2    Subjective:     Chief Complaint: Tonic clonic seizures    History of Present Illness: Mr. Blair is a 72 yo M with PMHx of PVD, CHF, and recent L AKA who presents to [facility] for new onset seizures and an AoC SDH. He originally presented to to the ED at [facility] via EMS with the complaint of seizure. Per his wife, the patient was in his normal state health yesterday and this morning.  She heard the bed shaking and found her  kicking his right leg and shaking his right arm in a manner most consistent with a tonic-clonic seizure approximately 1 hour prior to arrival.  Patient's wife states that she called EMS and upon EMS arrival patient was awake but postictal.  He was then being transported to the ambulance where he reportedly had another seizure and was given 2.5 mg of Versed.  Per chart review, the patient's wife states he is currently receiving antibiotics via a PICC for infected left AKA. She also denied any history of seizures or recent illness. Of note, the patient had a stroke approximately 10 years ago  for which he has residual deficit on the left side of his body and takes daily ASA. He was loaded with 3 g keppra prior to transfer to [facility].    He is being admitted to [facility] for hourly neuromonitoring and a higher level of care.       Hospital Course: 01/01/2022: EEG negative. Discontinue. Add Norvasc and SQH. Start TF. Step down orders to hospital medicine placed.   01/02/2022: Decrease coreg. D/c Norvasc. Boarding for hospital medicine.         Interval History:  Patient with reported waxing and waning difficulty to arouse in conjunction with drops in blood pressure. Decrease coreg and d/c norvasc. Initiate chest PT. Boarding for hospital medicine.     Review of Systems   Unable to perform ROS: Acuity of condition     Objective:     Vitals:  Temp: 97.8 °F (36.6 °C)  Pulse: (!)  57  Rhythm: sinus bradycardia  BP: 116/88  MAP (mmHg): 93  Resp: 13  SpO2: 98 %  O2 Device (Oxygen Therapy): nasal cannula    Temp  Min: 97.8 °F (36.6 °C)  Max: 98.5 °F (36.9 °C)  Pulse  Min: 51  Max: 68  BP  Min: 90/57  Max: 152/67  MAP (mmHg)  Min: 70  Max: 100  Resp  Min: 7  Max: 22  SpO2  Min: 90 %  Max: 100 %    01/01 0701 - 01/02 0700  In: 797.8 [I.V.:797.8]  Out: -    Unmeasured Output  Urine Occurrence: 1       Physical Exam  Physical Exam  Constitutional: Well-nourished, well-developed. No obvious distress.  Eyes: Clear conjunctiva. Anicteric. No discharge. Lids without lesions.  HEENT: MMM. Nose, external ears atraumatic.  Cardio: RRR. Pulses intact. Capillary refill time < 2 seconds.  Respiratory: Clear to auscultation. Regular effort.  GI: Bowel sounds present. Soft, non-tender.   Extremities: L AKA with wound present     Neurologic:  E4V4M6  More alert today, oriented to self and place   More conversational with some confused speech  Improvement in following commands  PERRL, EOMI  Moves R side spontaneously and AG  LUE no response to painful stimuli (baseline 2/2 to previous stroke)     Medications:  Continuous Scheduledatorvastatin, 20 mg, Daily  carvediloL, 6.25 mg, BID  digoxin, 0.125 mg, Daily  DULoxetine, 60 mg, BID  gabapentin, 300 mg, Q8H  heparin (porcine), 5,000 Units, Q8H  levETIRAcetam, 1,000 mg, BID  levothyroxine, 25 mcg, Before breakfast  OLANZapine, 2.5 mg, Daily  polyethylene glycol, 17 g, Daily  senna-docusate 8.6-50 mg, 1 tablet, BID  silodosin, 4 mg, Daily    PRNdextrose 50%, 12.5 g, PRN  glucagon (human recombinant), 1 mg, PRN  hydrALAZINE, 10 mg, Q6H PRN  insulin aspart U-100, 1-10 Units, Q6H PRN  labetalol, 10 mg, Q6H PRN  magnesium oxide, 800 mg, PRN  magnesium oxide, 800 mg, PRN  potassium bicarbonate, 35 mEq, PRN  potassium bicarbonate, 50 mEq, PRN  potassium bicarbonate, 60 mEq, PRN  potassium, sodium phosphates, 2 packet, PRN  potassium, sodium phosphates, 2 packet,  PRN  potassium, sodium phosphates, 2 packet, PRN  sodium chloride 0.9%, 10 mL, PRN      Today I personally reviewed pertinent medications, lines/drains/airways, imaging, cardiology results, laboratory results, microbiology results, notably:    Diet  Diet NPO  Diet NPO      Assessment/Plan:     Neuro  * Tonic clonic seizures  72 y/o M with new onset tonic clonic seizure-like activity of the R side witnessed by both his wife and EMS. Given 2.5 versed en route to [facility] and loaded with 3 g keppra.   -cEEG negative 1/1, discontinue  -No prior history of seizures   -Keppra 1 g BID         Acute on chronic intracranial subdural hematoma  -Admit to NCC, NSGY following  -q1h neuro checks, vital checks  -EKG, ECHO, CXR  -A1c, TSH, lipid panel, coag panel  -Daily CBC, CMP, mag, phos  -SBP < 160, prn labetalol and hydralazine  -SCDs, hold DVT chemoppx in setting of acute bleed  -Hold ASA  -CT 8 mm AoC SDH along the R posterior cerebral convexity with localized mass effect but no significant MLS  -Keppra 1 g bid   -PT/OT/SLP  -Repeat CT stable    History of CVA with residual deficit  CTH with multifocal large remote appearing infarcts, R>L with generalized cerebral volume loss and chronic ischemic changes  Statin  Hold ASA in setting of AoC SDH  q1h neuro checks  PT/OT/SLP  Repeat CT stable      Cardiac/Vascular  Peripheral vascular disease  statin    Hyperlipidemia  Resume home statin        Essential hypertension  SBP < 160  Decrease home carvedilol dose to 6.25  D/c Norvasc      CHF (congestive heart failure)  -History of CHF per chart review, no ECHO available  -ECHO, CXR   -resume home digoxin, digoxin level 0.6    Endocrine  Type 2 diabetes mellitus  -A1c 7.1 on admission  -SSI    Hypothyroidism  TSH 54, free T4 0.48 on admission  Resume home synthroid    Orthopedic  S/P AKA (above knee amputation) unilateral, left  History of L AKA with poor wound healing  -wound care consult  -currently afebrile with WBC WNL, bcx from [facility]  pending    Other  Elevated lactic acid level  -Present on admission, repeat pending  -Procal negative  -Afebrile and WBC WNL  -Blood cx from [facility] pending    Resolved          The patient is being Prophylaxed for:  Venous Thromboembolism with: Mechanical or Chemical  Stress Ulcer with: None  Ventilator Pneumonia with: none    Activity Orders          Turn patient starting at 12/31 1400    Elevate HOB starting at 12/31 1347    Diet NPO: NPO starting at 12/31 1347        DNR     Level III    Ladan Moyer PA-C  Neurocritical Care  [facility] - Neuro Critical Care

## 2022-01-02 NOTE — ASSESSMENT & PLAN NOTE
-History of CHF per chart review, no ECHO available  -ECHO, CXR   -resume home digoxin, digoxin level 0.6

## 2022-01-02 NOTE — PLAN OF CARE
[facility] Care Plan    POC reviewed with Raleigh Blair and family at 1500. Pt's wife verbalized understanding. Questions and concerns addressed. No acute events today. Pt progressing toward goals. Will continue to monitor. See below and flowsheets for full assessment and VS info.   -pending transfer to NPU    Neuro:  [place] Coma Scale  Best Eye Response: 4-->(E4) spontaneous  Best Motor Response: 6-->(M6) obeys commands  Best Verbal Response: 4-->(V4) confused  [place] Coma Scale Score: 14  Assessment Qualifiers: patient not sedated/intubated  Pupil PERRLA: yes     24 hr Temp:  [97.8 °F (36.6 °C)-98.4 °F (36.9 °C)]     CV:   Rhythm: sinus bradycardia  BP goals:   SBP < 160  MAP > 65    Resp:   O2 Device (Oxygen Therapy): room air       Plan: N/A    GI/:     Diet/Nutrition Received: NPO  Last Bowel Movement: 12/31/22  Voiding Characteristics: external catheter    Intake/Output Summary (Last 24 hours) at 1/2/2022 1536  Last data filed at 1/2/2022 1400  Gross per 24 hour   Intake 330 ml   Output 1000 ml   Net -670 ml     Unmeasured Output  Urine Occurrence: 1    Labs/Accuchecks:  Recent Labs   Lab 01/02/22  0432   WBC 10.89   RBC 3.66*   HGB 11.0*   HCT 35.5*         Recent Labs   Lab 01/02/22  0432      K 4.6   CO2 28      BUN 18   CREATININE 1.4   ALKPHOS 97   ALT 14   AST 17   BILITOT 0.4      Recent Labs   Lab 12/31/21  1019   INR 1.1   APTT 29.3      Recent Labs   Lab 12/31/21  1002      TROPONINI <0.006       Electrolytes: N/A - electrolytes WDL  Accuchecks: Q6H    Gtts:       LDA/Wounds:  Lines/Drains/Airways       Peripherally Inserted Central Catheter Line              PICC Single Lumen right brachial -- days              Drain              Male External Urinary Catheter 12/31/21 1345 Small 2 days              Peripheral Intravenous Line                   Peripheral IV - Single Lumen 12/31/21 0847 20 G Left;Posterior Hand 2 days                  Wounds: Yes  Wound care consulted:  Yes

## 2022-01-02 NOTE — PT/OT/SLP EVAL
"Occupational Therapy   Evaluation/Treatment    Name: Raleigh Blair  MRN: [id number]  Admitting Diagnosis:  Tonic clonic seizures  Recent Surgery: * No surgery found *      Recommendations:     Discharge Recommendations: nursing facility, skilled  Discharge Equipment Recommendations: TBD  Barriers to discharge: increased assistance needed    Assessment:     Raleigh Blair is a 71 y.o. male with a medical diagnosis of Tonic clonic seizures. He presents with performance deficits including weakness,impaired endurance,impaired self care skills,impaired functional mobilty,impaired balance,decreased safety awareness,decreased lower extremity function,decreased ROM,impaired coordination. Pt inconsistently follows commands and agitated at times with poor safety, attempting to pull at lines and get up to sofa-- L AKA and left-sided weakness at baseline. Pt would continue to benefit from OT to increase functional independence and safety. Recommend SNF upon D/C pending progress.      Rehab Prognosis: Fair; patient would benefit from acute skilled OT services to address these deficits and reach maximum level of function.       Plan:     Patient to be seen 3 x/week to address the above listed problems via self-care/home management,therapeutic activities,therapeutic exercises,neuromuscular re-education  · Plan of Care Expires: 02/02/22  · Plan of Care Reviewed with: patient    Subjective     Chief Complaint: None stated  Patient/Family Comments/goals: "Let me sit on that sofa"    Occupational Profile:  Living Environment: Pt reports he lives with spouse in 1-story house with walk-in shower-- will need to be verified.  Previous level of function: Reports ambulating with sabi-walker and L LE prosthesis, requires assist with ADLs  Equipment Used at Home:  walker, sabi,bedside commode (needs to be verified)  Assistance upon Discharge: Reports spouse is able to provided limited assistance due to having asthma    Pain/Comfort:  · Pain " "Rating 1: 0/10  · Location 1:  (L UE pain to touch)  · Pain Rating Post-Intervention 1: 0/10    Patients cultural, spiritual, Rastafarian conflicts given the current situation: no    Objective:     Communicated with: RN prior to session. Patient found with HOB elevated with telemetry,pulse ox (continuous),blood pressure cuff,peripheral IV,Condom Catheter,bed alarm upon OT entry to room, no family present.  "Let me sit on that sofa!"    General Precautions: Standard, fall,aspiration   Orthopedic Precautions:N/A   Braces: N/A  Respiratory Status: Room air    Occupational Performance:    Bed Mobility:    · Patient completed Scooting/Bridging with maximal assistance and 2 persons  · Patient completed Supine to Sit with maximal assistance  · Patient completed Sit to Supine with maximal assistance and 2 persons due to pt resisting    Functional Mobility/Transfers:  · Not attempted-- poor sitting balance and safety, L AKA without prosthesis present    Activities of Daily Living:  · Total A to don sock    Cognitive/Visual Perceptual:  Cognitive/Psychosocial Skills:     -       Oriented to: Person and Place (hospital)  -       Follows Commands/attention: Follows one-step commands inconsistently, easily distracted  -       Communication: impaired  -       Safety awareness/insight to disability: impaired   Visual/Perceptual: not assessed    Physical Exam:  Balance:    -       poor sitting balance requiring Max A-- easily distracted, attempting to remove IV and condom catheter and was returned to supine  Postural examination/scapula alignment:    -       Rounded shoulders  Upper Extremity Range of Motion:     -       Right Upper Extremity: WFL  -       Left Upper Extremity: no active movement noted and pt refused PROM, removing therapist's hand  Upper Extremity Strength:    -       Right Upper Extremity: WFL  -       Left Upper Extremity: 0/5   Strength:    -       Right Upper Extremity: WFL  -       Left Upper Extremity: " absent  Fine Motor Coordination: impaired L    AMPAC 6 Click ADL:  AMPAC Total Score: 9    Treatment & Education:  OT eval; educated on OT role and POC as well as progression with mobility and ADLs and will require ongoing education/reinforcement; RN at bedside to restrain pt as pt pulling at lines and becoming agitated  Education:    Patient left HOB elevated with all lines intact, call button in reach, bed alarm on, restraints reapplied at end of session and RN present    GOALS:   Multidisciplinary Problems     Occupational Therapy Goals        Problem: Occupational Therapy Goal    Goal Priority Disciplines Outcome Interventions   Occupational Therapy Goal     OT, PT/OT Ongoing, Progressing    Description: Goals to be met by: 7 days (1/9/22)     Patient will increase functional independence with ADLs by performing:    UE Dressing with Minimal Assistance.  LE Dressing with Moderate Assistance.  Grooming while seated with Minimal Assistance.  Toileting from bedside commode with Minimal Assistance for hygiene and clothing management.   Sitting at edge of bed x10 minutes with Contact Guard Assistance.  Supine to sit with Minimal Assistance.  Toilet transfer to bedside commode with Minimal Assistance.                     History:     Past Medical History:   Diagnosis Date    Congestive heart failure     Peripheral vascular disease     S/P AKA (above knee amputation)     Type 2 diabetes mellitus 1/2/2022       Past Surgical History:   Procedure Laterality Date    AMPUTATION      HIP SURGERY         Time Tracking:     OT Date of Treatment: 01/02/22  OT Start Time: 1039  OT Stop Time: 1055  OT Total Time (min): 16 min    Billable Minutes:Evaluation 8  Therapeutic Activity 8    1/2/2022

## 2022-01-02 NOTE — PLAN OF CARE
[facility] Care Plan    POC reviewed with Raleigh Blair at 0300. Pt unable to verbalize understanding.  No acute events overnight. Pt progressing toward goals. Will continue to monitor. See below and flowsheets for full assessment and VS info.       Neuro:  [place] Coma Scale  Best Eye Response: 3-->(E3) to speech  Best Motor Response: 6-->(M6) obeys commands  Best Verbal Response: 2-->(V2) incomprehensible speech  [place] Coma Scale Score: 11  Assessment Qualifiers: patient not sedated/intubated  Pupil PERRLA: yes     24hr Temp:  [97.9 °F (36.6 °C)-98.5 °F (36.9 °C)]     CV:   Rhythm: normal sinus rhythm  BP goals:   SBP < 160  MAP > 65    Resp:   O2 Device (Oxygen Therapy): nasal cannula       Plan: N/A    GI/:     Diet/Nutrition Received: NPO  Last Bowel Movement: 12/31/22  Voiding Characteristics: external catheter    Intake/Output Summary (Last 24 hours) at 1/2/2022 0716  Last data filed at 1/1/2022 1200  Gross per 24 hour   Intake 797.81 ml   Output --   Net 797.81 ml     Unmeasured Output  Urine Occurrence: 1    Labs/Accuchecks:  Recent Labs   Lab 01/02/22  0432   WBC 10.89   RBC 3.66*   HGB 11.0*   HCT 35.5*         Recent Labs   Lab 01/02/22  0432      K 4.6   CO2 28      BUN 18   CREATININE 1.4   ALKPHOS 97   ALT 14   AST 17   BILITOT 0.4      Recent Labs   Lab 12/31/21  1019   INR 1.1   APTT 29.3      Recent Labs   Lab 12/31/21  1002      TROPONINI <0.006       Electrolytes: N/A - electrolytes WDL  Accuchecks: Q6H    Gtts:       LDA/Wounds:  Lines/Drains/Airways       Peripherally Inserted Central Catheter Line              PICC Single Lumen right brachial -- days              Drain              Male External Urinary Catheter 12/31/21 1345 Small 1 day              Peripheral Intravenous Line                   Peripheral IV - Single Lumen 12/31/21 0847 20 G Left;Posterior Hand 1 day                  Wounds: Yes  Wound care consulted: Yes

## 2022-01-02 NOTE — ASSESSMENT & PLAN NOTE
72 y/o M with new onset tonic clonic seizure-like activity of the R side witnessed by both his wife and EMS. Given 2.5 versed en route to [facility] and loaded with 3 g keppra.   -cEEG negative 1/1, discontinue  -No prior history of seizures   -Keppra 1 g BID

## 2022-01-02 NOTE — PLAN OF CARE
Eval and POC set 1/2/22    Problem: Occupational Therapy Goal  Goal: Occupational Therapy Goal  Description: Goals to be met by: 7 days (1/9/22)     Patient will increase functional independence with ADLs by performing:    UE Dressing with Minimal Assistance.  LE Dressing with Moderate Assistance.  Grooming while seated with Minimal Assistance.  Toileting from bedside commode with Minimal Assistance for hygiene and clothing management.   Sitting at edge of bed x10 minutes with Contact Guard Assistance.  Supine to sit with Minimal Assistance.  Toilet transfer to bedside commode with Minimal Assistance.    Outcome: Ongoing, Progressing

## 2022-01-03 LAB
POCT GLUCOSE: 116 MG/DL (ref 70–110)
POCT GLUCOSE: 126 MG/DL (ref 70–110)
POCT GLUCOSE: 139 MG/DL (ref 70–110)
POCT GLUCOSE: 99 MG/DL (ref 70–110)

## 2022-01-03 PROCEDURE — 25000003 PHARM REV CODE 250

## 2022-01-03 PROCEDURE — 27000207 HC ISOLATION

## 2022-01-03 PROCEDURE — 92526 ORAL FUNCTION THERAPY: CPT

## 2022-01-03 PROCEDURE — 99233 SBSQ HOSP IP/OBS HIGH 50: CPT | Mod: GC,,, | Performed by: INTERNAL MEDICINE

## 2022-01-03 PROCEDURE — 99233 PR SUBSEQUENT HOSPITAL CARE,LEVL III: ICD-10-PCS | Mod: GC,,, | Performed by: INTERNAL MEDICINE

## 2022-01-03 PROCEDURE — 27000221 HC OXYGEN, UP TO 24 HOURS

## 2022-01-03 PROCEDURE — 63600175 PHARM REV CODE 636 W HCPCS

## 2022-01-03 PROCEDURE — 94761 N-INVAS EAR/PLS OXIMETRY MLT: CPT

## 2022-01-03 PROCEDURE — 25000003 PHARM REV CODE 250: Performed by: PSYCHIATRY & NEUROLOGY

## 2022-01-03 PROCEDURE — 94668 MNPJ CHEST WALL SBSQ: CPT

## 2022-01-03 PROCEDURE — 11000001 HC ACUTE MED/SURG PRIVATE ROOM

## 2022-01-03 RX ORDER — INSULIN ASPART 100 [IU]/ML
1-10 INJECTION, SOLUTION INTRAVENOUS; SUBCUTANEOUS
Status: DISCONTINUED | OUTPATIENT
Start: 2022-01-03 | End: 2022-01-05 | Stop reason: HOSPADM

## 2022-01-03 RX ORDER — MUPIROCIN 20 MG/G
OINTMENT TOPICAL 2 TIMES DAILY
Status: DISCONTINUED | OUTPATIENT
Start: 2022-01-03 | End: 2022-01-05 | Stop reason: HOSPADM

## 2022-01-03 RX ORDER — CARVEDILOL 3.12 MG/1
3.12 TABLET ORAL 2 TIMES DAILY
Status: DISCONTINUED | OUTPATIENT
Start: 2022-01-03 | End: 2022-01-05 | Stop reason: HOSPADM

## 2022-01-03 RX ADMIN — SILODOSIN 4 MG: 4 CAPSULE ORAL at 08:01

## 2022-01-03 RX ADMIN — GABAPENTIN 300 MG: 250 SOLUTION ORAL at 06:01

## 2022-01-03 RX ADMIN — DIGOXIN 0.12 MG: 125 TABLET ORAL at 08:01

## 2022-01-03 RX ADMIN — LEVETIRACETAM 1000 MG: 500 TABLET, FILM COATED ORAL at 08:01

## 2022-01-03 RX ADMIN — CARVEDILOL 6.25 MG: 6.25 TABLET, FILM COATED ORAL at 08:01

## 2022-01-03 RX ADMIN — GABAPENTIN 300 MG: 250 SOLUTION ORAL at 02:01

## 2022-01-03 RX ADMIN — HEPARIN SODIUM 5000 UNITS: 5000 INJECTION INTRAVENOUS; SUBCUTANEOUS at 06:01

## 2022-01-03 RX ADMIN — DULOXETINE 60 MG: 60 CAPSULE, DELAYED RELEASE ORAL at 10:01

## 2022-01-03 RX ADMIN — POLYETHYLENE GLYCOL 3350 17 G: 17 POWDER, FOR SOLUTION ORAL at 08:01

## 2022-01-03 RX ADMIN — LEVOTHYROXINE SODIUM 25 MCG: 0.03 TABLET ORAL at 06:01

## 2022-01-03 RX ADMIN — SENNOSIDES AND DOCUSATE SODIUM 1 TABLET: 50; 8.6 TABLET ORAL at 08:01

## 2022-01-03 RX ADMIN — SENNOSIDES AND DOCUSATE SODIUM 1 TABLET: 50; 8.6 TABLET ORAL at 10:01

## 2022-01-03 RX ADMIN — DULOXETINE 60 MG: 60 CAPSULE, DELAYED RELEASE ORAL at 08:01

## 2022-01-03 RX ADMIN — HEPARIN SODIUM 5000 UNITS: 5000 INJECTION INTRAVENOUS; SUBCUTANEOUS at 02:01

## 2022-01-03 RX ADMIN — OLANZAPINE 2.5 MG: 2.5 TABLET, FILM COATED ORAL at 08:01

## 2022-01-03 RX ADMIN — MUPIROCIN: 20 OINTMENT TOPICAL at 10:01

## 2022-01-03 RX ADMIN — LEVETIRACETAM 1000 MG: 500 TABLET, FILM COATED ORAL at 10:01

## 2022-01-03 RX ADMIN — ATORVASTATIN CALCIUM 20 MG: 20 TABLET, FILM COATED ORAL at 08:01

## 2022-01-03 RX ADMIN — HEPARIN SODIUM 5000 UNITS: 5000 INJECTION INTRAVENOUS; SUBCUTANEOUS at 10:01

## 2022-01-03 RX ADMIN — CARVEDILOL 3.12 MG: 3.12 TABLET, FILM COATED ORAL at 10:01

## 2022-01-03 NOTE — PLAN OF CARE
[facility] Care Plan    POC reviewed with Raleigh Blair at 0300. Pt verbalized understanding. Questions and concerns addressed. No acute events overnight. Pt progressing toward goals. Will continue to monitor. See below and flowsheets for full assessment and VS info.   - Pressures soft when sleeping, with stimulation returns to normal limits.  - O2 support during sleep due to significant respiratory obstruction/respiratory compromise.      Neuro:  [place] Coma Scale  Best Eye Response: 3-->(E3) to speech  Best Motor Response: 6-->(M6) obeys commands  Best Verbal Response: 4-->(V4) confused  [place] Coma Scale Score: 13  Assessment Qualifiers: patient not sedated/intubated  Pupil PERRLA: yes     24hr Temp:  [97 °F (36.1 °C)-97.9 °F (36.6 °C)]     CV:   Rhythm: normal sinus rhythm  BP goals:   SBP < 160  MAP > 65    Resp:   O2 Device (Oxygen Therapy): nasal cannula       Plan: N/A    GI/:     Diet/Nutrition Received: NPO  Last Bowel Movement: 12/31/22  Voiding Characteristics: external catheter    Intake/Output Summary (Last 24 hours) at 1/3/2022 0628  Last data filed at 1/2/2022 1600  Gross per 24 hour   Intake 380 ml   Output 1000 ml   Net -620 ml     Unmeasured Output  Urine Occurrence: 1    Labs/Accuchecks:  Recent Labs   Lab 01/02/22  0432   WBC 10.89   RBC 3.66*   HGB 11.0*   HCT 35.5*         Recent Labs   Lab 01/02/22  0432      K 4.6   CO2 28      BUN 18   CREATININE 1.4   ALKPHOS 97   ALT 14   AST 17   BILITOT 0.4      Recent Labs   Lab 12/31/21  1019   INR 1.1   APTT 29.3      Recent Labs   Lab 12/31/21  1002      TROPONINI <0.006       Electrolytes: N/A - electrolytes WDL  Accuchecks: Q6H    Gtts:       LDA/Wounds:  Lines/Drains/Airways       Peripherally Inserted Central Catheter Line              PICC Single Lumen right brachial -- days              Drain              Male External Urinary Catheter 12/31/21 1345 Small 2 days              Peripheral Intravenous Line                    Peripheral IV - Single Lumen 12/31/21 0847 20 G Left;Posterior Hand 2 days                  Wounds: Yes  Wound care consulted: Yes

## 2022-01-03 NOTE — ASSESSMENT & PLAN NOTE
-History of CHF per chart review, no ECHO available  -ECHO, CXR   -continue home digoxin, digoxin level 0.6

## 2022-01-03 NOTE — SUBJECTIVE & OBJECTIVE
Interval History:  NAEO. Pt resting in hospital bed. Soft pressures while sleeping with response when stimulated. Minimal O2 support while sleeping overnight.     Review of Systems   Unable to perform ROS: Acuity of condition       Objective:     Vitals:  Temp: 97 °F (36.1 °C)  Pulse: (!) 51  Rhythm: sinus bradycardia  BP: (!) 109/54  MAP (mmHg): 82  Resp: 11  SpO2: 98 %  O2 Device (Oxygen Therapy): nasal cannula    Temp  Min: 97 °F (36.1 °C)  Max: 97.6 °F (36.4 °C)  Pulse  Min: 51  Max: 63  BP  Min: 91/57  Max: 137/60  MAP (mmHg)  Min: 66  Max: 87  Resp  Min: 9  Max: 25  SpO2  Min: 92 %  Max: 100 %    01/02 0701 - 01/03 0700  In: 620 [P.O.:620]  Out: 1000 [Urine:1000]   Unmeasured Output  Urine Occurrence: 1       Physical Exam  Constitutional: Well-nourished, well-developed. No obvious distress.  Eyes: Clear conjunctiva. Anicteric. No discharge. Lids without lesions.  HEENT: MMM. Nose, external ears atraumatic.  Cardio: RRR. Pulses intact. Capillary refill time < 2 seconds.  Respiratory: Clear to auscultation. Regular effort.  GI: Bowel sounds present. Soft, non-tender.   Extremities: L AKA with wound present     Neurologic:  E4V4M6  More alert today, oriented to self and place   More conversational with some confused speech  Improvement in following commands  PERRL, EOMI  Moves R side spontaneously and AG  LUE no response to painful stimuli (baseline 2/2 to previous stroke)    Medications:  Continuous Scheduledatorvastatin, 20 mg, Daily  carvediloL, 3.125 mg, BID  digoxin, 0.125 mg, Daily  DULoxetine, 60 mg, BID  gabapentin, 300 mg, Q8H  heparin (porcine), 5,000 Units, Q8H  levETIRAcetam, 1,000 mg, BID  levothyroxine, 25 mcg, Before breakfast  mupirocin, , BID  OLANZapine, 2.5 mg, Daily  polyethylene glycol, 17 g, Daily  senna-docusate 8.6-50 mg, 1 tablet, BID  silodosin, 4 mg, Daily    PRNdextrose 50%, 12.5 g, PRN  glucagon (human recombinant), 1 mg, PRN  hydrALAZINE, 10 mg, Q6H PRN  insulin aspart U-100, 1-10  Units, Q6H PRN  labetalol, 10 mg, Q6H PRN  magnesium oxide, 800 mg, PRN  magnesium oxide, 800 mg, PRN  potassium bicarbonate, 35 mEq, PRN  potassium bicarbonate, 50 mEq, PRN  potassium bicarbonate, 60 mEq, PRN  potassium, sodium phosphates, 2 packet, PRN  potassium, sodium phosphates, 2 packet, PRN  potassium, sodium phosphates, 2 packet, PRN  sodium chloride 0.9%, 10 mL, PRN      Today I personally reviewed pertinent medications, lines/drains/airways, imaging, cardiology results, laboratory results, microbiology results, notably:    Diet  Diet NPO  Diet NPO

## 2022-01-03 NOTE — NURSING TRANSFER
Nursing Transfer Note      1/3/2022     Reason patient is being transferred: no longer needs ICU care    Transfer To: [id number] from [facility]    Transfer via bed    Transfer with cardiac monitoring    Transported by MAURICE Olson    Medicines sent:     Any special needs or follow-up needed: none    Chart send with patient: Yes    Notified: son (Chapito)    Patient reassessed at: 1/3/22     Upon arrival to floor: cardiac monitor applied, patient oriented to room, call bell in reach and bed in lowest position

## 2022-01-03 NOTE — PLAN OF CARE
01/03/22 1549   Post-Acute Status   Post-Acute Authorization Placement   Post-Acute Placement Status Referrals Sent  (SNF)     MARITZA advised by CM that she provided SNF list to Pt daughter via phone during dc assessment. Per daughter, Pt was at [facility] before and fell twice. She does want SNF but not there. Sent referrals via [facility] to [facility] , [facility], [facility], [facility], [facility], [facility], [facility], and [facility].    MARITZA spoke with Ronald with [facility] ([phone]) regarding this Pt. He reported they were taking care of him and were actually set to remove his PICC line today. If Pt needs further care post hospital and/or post SNF, they would be happy to accept again.     Tanya Banks, KRISSYW  Neurocritical Care   [facility]  [id number]

## 2022-01-03 NOTE — ASSESSMENT & PLAN NOTE
70 y/o M with new onset tonic clonic seizure-like activity of the R side witnessed by both his wife and EMS. Given 2.5 versed en route to [facility] and loaded with 3 g keppra.   -cEEG negative 1/1, discontinue  -No prior history of seizures   -Keppra 1 g BID   -boarding for [facility]

## 2022-01-03 NOTE — PT/OT/SLP PROGRESS
Speech Language Pathology Treatment    Patient Name:  Raleigh Blair   MRN:  [id number]  Admitting Diagnosis: Tonic clonic seizures    Recommendations:                 General Recommendations:  Dysphagia therapy  Diet recommendations:  Mechanical soft, Liquid Diet Level: Thin   Aspiration Precautions: 1 bite/sip at a time, Meds crushed in puree and Standard aspiration precautions   General Precautions: Standard,    Communication strategies:  none    Subjective     Pt awake and alert     Pain/Comfort:  · Pain Rating 1: 0/10  · Pain Rating Post-Intervention 1: 0/10    Respiratory Status: Nasal cannula, flow   L/min    Objective:     Has the patient been evaluated by SLP for swallowing?   Yes  Keep patient NPO? No     Pt seen for ongoing swallow assessment. Per RN pt self-pulled NG tube prior to SLP arrival and team unsure if able to fully advance diet at this time.  Pt with sigificnalty improved alertness this date compared to initial assessment. Pt offered trials of thin liquids via straw and regular solids x3. Pt with slow yet functional chewing patterns mild left sided residue post solid trial thin which cleared with thin liquids. Pt appearing safe to advance to thin liquids and mechanical soft solids. Pt appearing at/or near baseline speech service will continue to monitor.     Assessment:     Raleigh Blair is a 71 y.o. male with an SLP diagnosis of Dysphagia  Appearing to gradually improve with improved alertness otherwise safe for thin liquids and mechanical soft solids.     Goals:   Multidisciplinary Problems     SLP Goals        Problem: SLP Goal    Goal Priority Disciplines Outcome   SLP Goal     SLP    Description: Speech Language Pathology Goals  Goals expected to be met by 1/8/22    1. Pt will tolerate diet of  thin liqiuds and purees,without overt clinical signs of aspiration   2. Pt will participate in ongoing assessment of swallow function to help determine least restrictive diet                   Plan:      · Patient to be seen:  4 x/week   · Plan of Care expires:     · Plan of Care reviewed with:  patient   · SLP Follow-Up:  Yes       Discharge recommendations:   (TBD)   Barriers to Discharge:  TBD    Time Tracking:     SLP Treatment Date:   01/03/22  Speech Start Time:  1019  Speech Stop Time:  1027     Speech Total Time (min):  8 min    Billable Minutes: Treatment Swallowing Dysfunction 8    01/03/2022

## 2022-01-03 NOTE — PLAN OF CARE
[facility]  Initial Discharge Assessment       Primary Care Provider: Joss Funk MD    Admission Diagnosis: Seizure [R56.9]    Admission Date: 12/31/2021  Expected Discharge Date: 1/12/2022    Discharge Barriers Identified: None    Payor: HU[employer] MANAGED MEDICARE / Plan: [employer] MEDICARE HMO / Product Type: Capitation /     Extended Emergency Contact Information  Primary Emergency Contact: Chapito Blair  Mobile Phone: [phone]  Relation: Son   needed? No  Secondary Emergency Contact: Mckenzie Blair  Mobile Phone: [phone]  Relation: Spouse   needed? No    Discharge Plan A: Skilled Nursing Facility  Discharge Plan B: Home with family,Home Health      [id number] - [place] - [address] AT [facility] [address]  [address]  [place] [address]  Phone: [phone] Fax: [phone]    [facility] - [place] - [address]  [address]  [place] [address]  Phone: [phone] Fax: [phone]      Transferred from: [facility]    Past Medical History:   Diagnosis Date    Congestive heart failure     Peripheral vascular disease     S/P AKA (above knee amputation)     Type 2 diabetes mellitus 1/2/2022         CM met with patient in room for Discharge Planning Assessment.  Patient is unable to answer questions. No family in room.  Phone call to Mckenzie Blair (wife) [phone].   Per wife, the patient lives with wife in a 2 story houes with 1 step(s) to enter and a ramp.   Per wife, the patient is total care with ADLS and is bed/wheelchair bound at baseline.  Wife stated that she gets patient up from hospital bed 3 times per day.    Patient will have assistance from his wife upon discharge.  Per wife, patient is current with [facility].  SNF discussed.  Per wife, it is ok to send referrals to the 4-5 star facilities on Medicare.gov, except for [facility].   Discharge  Planning Booklet left in room for  patient/family and discussed.  All questions addressed.  CM will follow for needs.      Initial Assessment (most recent)     Adult Discharge Assessment - 01/03/22 1500        Discharge Assessment    Assessment Type Discharge Planning Assessment     Confirmed/corrected address, phone number and insurance Yes     Confirmed Demographics Correct on Facesheet     Source of Information family     Communicated DEBORA with patient/caregiver Date not available/Unable to determine     Reason For Admission Seizures     Lives With spouse     Facility Arrived From: [facility]     Do you expect to return to your current living situation? Yes     Do you have help at home or someone to help you manage your care at home? Yes     Who are your caregiver(s) and their phone number(s)? Mckenzie Blair (wife) [phone]     Prior to hospitilization cognitive status: Unable to Assess     Current cognitive status: Unable to Assess     Walking or Climbing Stairs Difficulty ambulation difficulty, dependent;stair climbing difficulty, dependent;transferring difficulty, dependent     Mobility Management Wheelchair/bedbound     Dressing/Bathing Difficulty bathing difficulty, dependent;dressing difficulty, dependent     Dressing/Bathing Management Wife assists     Home Layout Able to live on 1st floor     Equipment Currently Used at Home wheelchair;walker, sabi;bedside commode;hospital bed     Readmission within 30 days? No     Patient currently being followed by outpatient case management? No     Do you currently have service(s) that help you manage your care at home? Yes     Name and Contact number of agency [facility]     Is the pt/caregiver preference to resume services with current agency Yes     Do you take prescription medications? Yes     Do you have prescription coverage? Yes     Coverage [employer]     Do you have any problems affording any of your prescribed medications? No     Who is going to help you get  home at discharge? Mckenzie Blair (wife) [phone]     How do you get to doctors appointments? family or friend will provide     Are you on dialysis? No     Do you take coumadin? No     Discharge Plan A Skilled Nursing Facility     Discharge Plan B Home with family;Home Health     DME Needed Upon Discharge  none     Discharge Plan discussed with: Spouse/sig other     Name(s) and Number(s) Mckenzie Blair (wife) [phone]     Discharge Barriers Identified None        Relationship/Environment    Name(s) of Who Lives With Patient Mckenzie Blair (wife) [phone]               Sushila Mauro RN, CCRN-K, [facility]  Neuro-Critical Care   X [id number]

## 2022-01-03 NOTE — PROGRESS NOTES
[facility]  Neurocritical Care  Progress Note    Admit Date: 12/31/2021  Service Date: 01/03/2022  Length of Stay: 3    Subjective:     Chief Complaint: Tonic clonic seizures    History of Present Illness: Mr. Blair is a 70 yo M with PMHx of PVD, CHF, and recent L AKA who presents to [facility] for new onset seizures and an AoC SDH. He originally presented to to the ED at [facility] via EMS with the complaint of seizure. Per his wife, the patient was in his normal state health yesterday and this morning.  She heard the bed shaking and found her  kicking his right leg and shaking his right arm in a manner most consistent with a tonic-clonic seizure approximately 1 hour prior to arrival.  Patient's wife states that she called EMS and upon EMS arrival patient was awake but postictal.  He was then being transported to the ambulance where he reportedly had another seizure and was given 2.5 mg of Versed.  Per chart review, the patient's wife states he is currently receiving antibiotics via a PICC for infected left AKA. She also denied any history of seizures or recent illness. Of note, the patient had a stroke approximately 10 years ago  for which he has residual deficit on the left side of his body and takes daily ASA. He was loaded with 3 g keppra prior to transfer to [facility].    He is being admitted to [facility] for hourly neuromonitoring and a higher level of care.       Hospital Course: 01/01/2022: EEG negative. Discontinue. Add Norvasc and SQH. Start TF. Step down orders to hospital medicine placed.   01/02/2022: Decrease coreg. D/c Norvasc. Boarding for hospital medicine.   01/03/2022 Coreg decreased to 3.125 BID. Continue Digoxin. Continues to board for hospital medicine           Interval History:  NAEO. Pt resting in hospital bed. Soft pressures while sleeping with response when stimulated. Minimal O2 support while sleeping overnight.     Review of Systems   Unable to perform ROS: Acuity of condition        Objective:     Vitals:  Temp: 97 °F (36.1 °C)  Pulse: (!) 51  Rhythm: sinus bradycardia  BP: (!) 109/54  MAP (mmHg): 82  Resp: 11  SpO2: 98 %  O2 Device (Oxygen Therapy): nasal cannula    Temp  Min: 97 °F (36.1 °C)  Max: 97.6 °F (36.4 °C)  Pulse  Min: 51  Max: 63  BP  Min: 91/57  Max: 137/60  MAP (mmHg)  Min: 66  Max: 87  Resp  Min: 9  Max: 25  SpO2  Min: 92 %  Max: 100 %    01/02 0701 - 01/03 0700  In: 620 [P.O.:620]  Out: 1000 [Urine:1000]   Unmeasured Output  Urine Occurrence: 1       Physical Exam  Constitutional: Well-nourished, well-developed. No obvious distress.  Eyes: Clear conjunctiva. Anicteric. No discharge. Lids without lesions.  HEENT: MMM. Nose, external ears atraumatic.  Cardio: RRR. Pulses intact. Capillary refill time < 2 seconds.  Respiratory: Clear to auscultation. Regular effort.  GI: Bowel sounds present. Soft, non-tender.   Extremities: L AKA with wound present     Neurologic:  [id number]  More alert today, oriented to self and place   More conversational with some confused speech  Improvement in following commands  PERRL, EOMI  Moves R side spontaneously and AG  LUE no response to painful stimuli (baseline 2/2 to previous stroke)    Medications:  Continuous Scheduledatorvastatin, 20 mg, Daily  carvediloL, 3.125 mg, BID  digoxin, 0.125 mg, Daily  DULoxetine, 60 mg, BID  gabapentin, 300 mg, Q8H  heparin (porcine), 5,000 Units, Q8H  levETIRAcetam, 1,000 mg, BID  levothyroxine, 25 mcg, Before breakfast  mupirocin, , BID  OLANZapine, 2.5 mg, Daily  polyethylene glycol, 17 g, Daily  senna-docusate 8.6-50 mg, 1 tablet, BID  silodosin, 4 mg, Daily    PRNdextrose 50%, 12.5 g, PRN  glucagon (human recombinant), 1 mg, PRN  hydrALAZINE, 10 mg, Q6H PRN  insulin aspart U-100, 1-10 Units, Q6H PRN  labetalol, 10 mg, Q6H PRN  magnesium oxide, 800 mg, PRN  magnesium oxide, 800 mg, PRN  potassium bicarbonate, 35 mEq, PRN  potassium bicarbonate, 50 mEq, PRN  potassium bicarbonate, 60 mEq, PRN  potassium,  sodium phosphates, 2 packet, PRN  potassium, sodium phosphates, 2 packet, PRN  potassium, sodium phosphates, 2 packet, PRN  sodium chloride 0.9%, 10 mL, PRN      Today I personally reviewed pertinent medications, lines/drains/airways, imaging, cardiology results, laboratory results, microbiology results, notably:    Diet  Diet NPO  Diet NPO        Assessment/Plan:     Neuro  * Tonic clonic seizures  70 y/o M with new onset tonic clonic seizure-like activity of the R side witnessed by both his wife and EMS. Given 2.5 versed en route to [facility] and loaded with 3 g keppra.   -cEEG negative 1/1, discontinue  -No prior history of seizures   -Keppra 1 g BID   -boarding for hospital medicine         Acute on chronic intracranial subdural hematoma  -Admit to [facility], NSGY following  -q1h neuro checks, vital checks  -EKG, ECHO, CXR  -A1c, TSH, lipid panel, coag panel  -Daily CBC, CMP, mag, phos  -SBP < 160, prn labetalol and hydralazine  -SCDs, hold DVT chemoppx in setting of acute bleed  -Hold ASA  -CT 8 mm AoC SDH along the R posterior cerebral convexity with localized mass effect but no significant MLS  -Keppra 1 g bid   -PT/OT/SLP  -Repeat CT stable    History of CVA with residual deficit  CTH with multifocal large remote appearing infarcts, R>L with generalized cerebral volume loss and chronic ischemic changes  Statin  Hold ASA in setting of AoC SDH  q1h neuro checks  PT/OT/SLP  Repeat CT stable      Cardiac/Vascular  Peripheral vascular disease  statin    Hyperlipidemia  Resume home statin        Essential hypertension  SBP < 160  Decrease home carvedilol dose to 3.125mg BID  D/c Norvasc      CHF (congestive heart failure)  -History of CHF per chart review, no ECHO available  -ECHO, CXR   -continue home digoxin, digoxin level 0.6    Endocrine  Type 2 diabetes mellitus  -A1c 7.1 on admission  -SSI    Hypothyroidism  TSH 54, free T4 0.48 on admission  Resume home synthroid    Orthopedic  S/P AKA (above knee amputation)  unilateral, left  History of L AKA with poor wound healing  -wound care consult  -currently afebrile with WBC WNL, bcx from [facility] pending    Other  Elevated lactic acid level  -Present on admission, repeat pending  -Procal negative  -Afebrile and WBC WNL  -Blood cx from [facility] pending    Resolved        The patient is being Prophylaxed for:  Venous Thromboembolism with: Chemical  Stress Ulcer with: Not Applicable   Ventilator Pneumonia with: not applicable    Activity Orders          Turn patient starting at 12/31 1400    Elevate HOB starting at 12/31 1347    Diet NPO: NPO starting at 12/31 1347        DNR    Viky Zepeda MD  Neurocritical Care  [facility] - Neuro Critical Care

## 2022-01-03 NOTE — CONSULTS
Inpatient consult to Physical Medicine Rehab  Consult performed by: Gwen Kimble NP  Consult ordered by: Ladan Moyer PA-C  Reason for consult: Assess rehab needs      Reviewed patient history and current admission.  Rehab team following.  Full consult to follow.    BROCK William, FNP-C  Physical Medicine & Rehabilitation   01/03/2022

## 2022-01-03 NOTE — PROGRESS NOTES
[facility] Critical [facility]  Wound Care    Patient Name:  Raleigh Blair   MRN:  [id number]  Date: 1/3/2022  Diagnosis: Tonic clonic seizures    History:     Past Medical History:   Diagnosis Date    Congestive heart failure     Peripheral vascular disease     S/P AKA (above knee amputation)     Type 2 diabetes mellitus 1/2/2022       Social History     Socioeconomic History    Marital status:    Tobacco Use    Smoking status: Current Every Day Smoker     Packs/day: 0.25    Smokeless tobacco: Current User   Substance and Sexual Activity    Alcohol use: Yes     Comment: occassionally    Drug use: Never    Sexual activity: Not Currently       Precautions:     Allergies as of 12/31/2021    (No Known Allergies)       [facility] Assessment Details/Treatment     Wound consult received on patient's chronic Left AKA incision, slow healing.  Patient sees outpatient wound care.     Left AKA incision: Wound moist red, scant serous drainage, no odor, periwound intact.  Recommend MWF: clean with sterile normal saline, cavilon skin barrier to periwound, pack with aquacel ag packing strips, cover with mepilex foam dressing.    Scrotal ulceration noted to left aspect of scrotum during assessment, unknown etiology, recommend applying criticaid clear barrier cream twice a day and as needed.    Dr. Viky Zepeda with NCC team approved recommendations.  Nursing to continue care. Wound care to assist as needed.         01/03/22 1007        Incision/Site 09/10/21 1300 Left Other (see comments)   Date First Assessed/Time First Assessed: 09/10/21 1300   Side: Left  Location: (c) Other (see comments)  Wound Outcome: Amputation   Wound Image    Incision WDL ex   Drainage Amount Scant   Drainage Characteristics/Odor Serous;No odor   Appearance Moist;Red   Periwound Area Scar tissue   Wound Edges Open   Wound Length (cm) 0.5 cm   Wound Width (cm) 4 cm   Wound Depth (cm) 1 cm   Wound Volume (cm^3) 2 cm^3   Wound Surface Area (cm^2) 2 cm^2   Care  Cleansed with:;Sterile normal saline   Dressing Changed   Packing packed with  (aquacel ag packing strip)   Packing Inserted  2   Periwound Care Skin barrier film applied   Dressing Change Due 01/05/22        Altered Skin Integrity 01/03/22 1007 Scrotum Ulceration   Date First Assessed/Time First Assessed: 01/03/22 1007   Location: Scrotum  Primary Wound Type: Ulceration   Wound Image    Drainage Amount None   Drainage Characteristics/Odor No odor   Appearance Moist;Red   Tissue loss description Partial thickness   Periwound Area Intact   Wound Edges Open   Wound Length (cm) 1 cm   Wound Width (cm) 1 cm   Wound Depth (cm) 0.1 cm   Wound Volume (cm^3) 0.1 cm^3   Wound Surface Area (cm^2) 1 cm^2

## 2022-01-04 PROBLEM — Z78.9 IMPAIRED MOBILITY AND ACTIVITIES OF DAILY LIVING: Status: ACTIVE | Noted: 2022-01-04

## 2022-01-04 PROBLEM — Z74.09 IMPAIRED MOBILITY AND ACTIVITIES OF DAILY LIVING: Status: ACTIVE | Noted: 2022-01-04

## 2022-01-04 LAB
ALBUMIN SERPL BCP-MCNC: 3.7 G/DL (ref 3.5–5.2)
ALP SERPL-CCNC: 105 U/L (ref 55–135)
ALT SERPL W/O P-5'-P-CCNC: 15 U/L (ref 10–44)
ANION GAP SERPL CALC-SCNC: 9 MMOL/L (ref 8–16)
AST SERPL-CCNC: 16 U/L (ref 10–40)
BASOPHILS # BLD AUTO: 0.09 K/UL (ref 0–0.2)
BASOPHILS NFR BLD: 1 % (ref 0–1.9)
BILIRUB SERPL-MCNC: 0.4 MG/DL (ref 0.1–1)
BUN SERPL-MCNC: 14 MG/DL (ref 8–23)
CALCIUM SERPL-MCNC: 9.9 MG/DL (ref 8.7–10.5)
CHLORIDE SERPL-SCNC: 103 MMOL/L (ref 95–110)
CO2 SERPL-SCNC: 30 MMOL/L (ref 23–29)
CREAT SERPL-MCNC: 1.2 MG/DL (ref 0.5–1.4)
DIFFERENTIAL METHOD: ABNORMAL
EOSINOPHIL # BLD AUTO: 0.2 K/UL (ref 0–0.5)
EOSINOPHIL NFR BLD: 2.4 % (ref 0–8)
ERYTHROCYTE [DISTWIDTH] IN BLOOD BY AUTOMATED COUNT: 16.3 % (ref 11.5–14.5)
EST. GFR  (AFRICAN AMERICAN): >60 ML/MIN/1.73 M^2
EST. GFR  (NON AFRICAN AMERICAN): >60 ML/MIN/1.73 M^2
GLUCOSE SERPL-MCNC: 151 MG/DL (ref 70–110)
HCT VFR BLD AUTO: 37.5 % (ref 40–54)
HGB BLD-MCNC: 11.8 G/DL (ref 14–18)
IMM GRANULOCYTES # BLD AUTO: 0.08 K/UL (ref 0–0.04)
IMM GRANULOCYTES NFR BLD AUTO: 0.9 % (ref 0–0.5)
LYMPHOCYTES # BLD AUTO: 2.5 K/UL (ref 1–4.8)
LYMPHOCYTES NFR BLD: 27.6 % (ref 18–48)
MAGNESIUM SERPL-MCNC: 2.3 MG/DL (ref 1.6–2.6)
MCH RBC QN AUTO: 30.6 PG (ref 27–31)
MCHC RBC AUTO-ENTMCNC: 31.5 G/DL (ref 32–36)
MCV RBC AUTO: 97 FL (ref 82–98)
MONOCYTES # BLD AUTO: 0.7 K/UL (ref 0.3–1)
MONOCYTES NFR BLD: 7.5 % (ref 4–15)
NEUTROPHILS # BLD AUTO: 5.6 K/UL (ref 1.8–7.7)
NEUTROPHILS NFR BLD: 60.6 % (ref 38–73)
NRBC BLD-RTO: 0 /100 WBC
PHOSPHATE SERPL-MCNC: 2.6 MG/DL (ref 2.7–4.5)
PLATELET # BLD AUTO: 291 K/UL (ref 150–450)
PMV BLD AUTO: 9.4 FL (ref 9.2–12.9)
POCT GLUCOSE: 115 MG/DL (ref 70–110)
POCT GLUCOSE: 127 MG/DL (ref 70–110)
POCT GLUCOSE: 171 MG/DL (ref 70–110)
POTASSIUM SERPL-SCNC: 4.1 MMOL/L (ref 3.5–5.1)
PROT SERPL-MCNC: 7.5 G/DL (ref 6–8.4)
RBC # BLD AUTO: 3.86 M/UL (ref 4.6–6.2)
SODIUM SERPL-SCNC: 142 MMOL/L (ref 136–145)
WBC # BLD AUTO: 9.19 K/UL (ref 3.9–12.7)

## 2022-01-04 PROCEDURE — 85025 COMPLETE CBC W/AUTO DIFF WBC: CPT

## 2022-01-04 PROCEDURE — 97530 THERAPEUTIC ACTIVITIES: CPT

## 2022-01-04 PROCEDURE — 99232 SBSQ HOSP IP/OBS MODERATE 35: CPT | Mod: ,,, | Performed by: STUDENT IN AN ORGANIZED HEALTH CARE EDUCATION/TRAINING PROGRAM

## 2022-01-04 PROCEDURE — 27000207 HC ISOLATION

## 2022-01-04 PROCEDURE — 97535 SELF CARE MNGMENT TRAINING: CPT

## 2022-01-04 PROCEDURE — 63600175 PHARM REV CODE 636 W HCPCS

## 2022-01-04 PROCEDURE — 25000003 PHARM REV CODE 250: Performed by: STUDENT IN AN ORGANIZED HEALTH CARE EDUCATION/TRAINING PROGRAM

## 2022-01-04 PROCEDURE — 99222 1ST HOSP IP/OBS MODERATE 55: CPT | Mod: ,,, | Performed by: NURSE PRACTITIONER

## 2022-01-04 PROCEDURE — 25000003 PHARM REV CODE 250

## 2022-01-04 PROCEDURE — 84100 ASSAY OF PHOSPHORUS: CPT

## 2022-01-04 PROCEDURE — 80053 COMPREHEN METABOLIC PANEL: CPT

## 2022-01-04 PROCEDURE — 97112 NEUROMUSCULAR REEDUCATION: CPT

## 2022-01-04 PROCEDURE — 11000001 HC ACUTE MED/SURG PRIVATE ROOM

## 2022-01-04 PROCEDURE — 99222 PR INITIAL HOSPITAL CARE,LEVL II: ICD-10-PCS | Mod: ,,, | Performed by: NURSE PRACTITIONER

## 2022-01-04 PROCEDURE — 25000003 PHARM REV CODE 250: Performed by: PSYCHIATRY & NEUROLOGY

## 2022-01-04 PROCEDURE — 99232 PR SUBSEQUENT HOSPITAL CARE,LEVL II: ICD-10-PCS | Mod: ,,, | Performed by: STUDENT IN AN ORGANIZED HEALTH CARE EDUCATION/TRAINING PROGRAM

## 2022-01-04 PROCEDURE — 36415 COLL VENOUS BLD VENIPUNCTURE: CPT

## 2022-01-04 PROCEDURE — 83735 ASSAY OF MAGNESIUM: CPT

## 2022-01-04 PROCEDURE — 92526 ORAL FUNCTION THERAPY: CPT

## 2022-01-04 RX ORDER — PSEUDOEPHEDRINE/ACETAMINOPHEN 30MG-500MG
100 TABLET ORAL
Status: DISPENSED | OUTPATIENT
Start: 2022-01-04 | End: 2022-01-04

## 2022-01-04 RX ORDER — BISACODYL 10 MG
10 SUPPOSITORY, RECTAL RECTAL ONCE
Status: COMPLETED | OUTPATIENT
Start: 2022-01-04 | End: 2022-01-04

## 2022-01-04 RX ORDER — SORBITOL SOLUTION 70 %
15 SOLUTION, ORAL MISCELLANEOUS ONCE
Status: COMPLETED | OUTPATIENT
Start: 2022-01-04 | End: 2022-01-04

## 2022-01-04 RX ORDER — AMOXICILLIN 250 MG
2 CAPSULE ORAL 2 TIMES DAILY
Status: DISCONTINUED | OUTPATIENT
Start: 2022-01-04 | End: 2022-01-05 | Stop reason: HOSPADM

## 2022-01-04 RX ORDER — POLYETHYLENE GLYCOL 3350 17 G/17G
17 POWDER, FOR SOLUTION ORAL 2 TIMES DAILY
Status: DISCONTINUED | OUTPATIENT
Start: 2022-01-04 | End: 2022-01-05 | Stop reason: HOSPADM

## 2022-01-04 RX ORDER — LEVOTHYROXINE SODIUM 50 UG/1
50 TABLET ORAL
Status: DISCONTINUED | OUTPATIENT
Start: 2022-01-05 | End: 2022-01-05 | Stop reason: HOSPADM

## 2022-01-04 RX ORDER — SYRING-NEEDL,DISP,INSUL,0.3 ML 29 G X1/2"
296 SYRINGE, EMPTY DISPOSABLE MISCELLANEOUS
Status: DISPENSED | OUTPATIENT
Start: 2022-01-04 | End: 2022-01-04

## 2022-01-04 RX ORDER — BISACODYL 5 MG
5 TABLET, DELAYED RELEASE (ENTERIC COATED) ORAL ONCE
Status: DISCONTINUED | OUTPATIENT
Start: 2022-01-04 | End: 2022-01-05 | Stop reason: HOSPADM

## 2022-01-04 RX ADMIN — LEVOTHYROXINE SODIUM 25 MCG: 0.03 TABLET ORAL at 05:01

## 2022-01-04 RX ADMIN — BISACODYL 10 MG: 10 SUPPOSITORY RECTAL at 01:01

## 2022-01-04 RX ADMIN — DULOXETINE 60 MG: 60 CAPSULE, DELAYED RELEASE ORAL at 09:01

## 2022-01-04 RX ADMIN — MUPIROCIN: 20 OINTMENT TOPICAL at 09:01

## 2022-01-04 RX ADMIN — GABAPENTIN 300 MG: 250 SOLUTION ORAL at 04:01

## 2022-01-04 RX ADMIN — HEPARIN SODIUM 5000 UNITS: 5000 INJECTION INTRAVENOUS; SUBCUTANEOUS at 02:01

## 2022-01-04 RX ADMIN — POLYETHYLENE GLYCOL 3350 17 G: 17 POWDER, FOR SOLUTION ORAL at 09:01

## 2022-01-04 RX ADMIN — DULOXETINE 60 MG: 60 CAPSULE, DELAYED RELEASE ORAL at 08:01

## 2022-01-04 RX ADMIN — HEPARIN SODIUM 5000 UNITS: 5000 INJECTION INTRAVENOUS; SUBCUTANEOUS at 05:01

## 2022-01-04 RX ADMIN — SILODOSIN 4 MG: 4 CAPSULE ORAL at 08:01

## 2022-01-04 RX ADMIN — SORBITOL SOLUTION (BULK) 15 ML: 70 SOLUTION at 03:01

## 2022-01-04 RX ADMIN — POLYETHYLENE GLYCOL 3350 17 G: 17 POWDER, FOR SOLUTION ORAL at 08:01

## 2022-01-04 RX ADMIN — OLANZAPINE 2.5 MG: 2.5 TABLET, FILM COATED ORAL at 08:01

## 2022-01-04 RX ADMIN — CARVEDILOL 3.12 MG: 3.12 TABLET, FILM COATED ORAL at 09:01

## 2022-01-04 RX ADMIN — DOCUSATE SODIUM 50 MG AND SENNOSIDES 8.6 MG 2 TABLET: 8.6; 5 TABLET, FILM COATED ORAL at 09:01

## 2022-01-04 RX ADMIN — LEVETIRACETAM 1000 MG: 500 TABLET, FILM COATED ORAL at 08:01

## 2022-01-04 RX ADMIN — SENNOSIDES AND DOCUSATE SODIUM 1 TABLET: 50; 8.6 TABLET ORAL at 08:01

## 2022-01-04 RX ADMIN — ATORVASTATIN CALCIUM 20 MG: 20 TABLET, FILM COATED ORAL at 08:01

## 2022-01-04 RX ADMIN — DIGOXIN 0.12 MG: 125 TABLET ORAL at 08:01

## 2022-01-04 RX ADMIN — GABAPENTIN 300 MG: 250 SOLUTION ORAL at 09:01

## 2022-01-04 RX ADMIN — LEVETIRACETAM 1000 MG: 500 TABLET, FILM COATED ORAL at 09:01

## 2022-01-04 RX ADMIN — HEPARIN SODIUM 5000 UNITS: 5000 INJECTION INTRAVENOUS; SUBCUTANEOUS at 09:01

## 2022-01-04 RX ADMIN — GABAPENTIN 300 MG: 250 SOLUTION ORAL at 12:01

## 2022-01-04 NOTE — PT/OT/SLP PROGRESS
"Physical Therapy Co-Treatment    Patient Name:  Raleigh Blair   MRN:  [id number]    Recommendations:     Discharge Recommendations:  home health PT   Discharge Equipment Recommendations: none   Barriers to discharge: Increased level of assist    Assessment:     Raleigh Blair is a 71 y.o. male admitted with a medical diagnosis of Tonic clonic seizures.  He presents with the following impairments/functional limitations: weakness,impaired endurance,impaired self care skills,impaired functional mobilty,gait instability,impaired balance,decreased lower extremity function,decreased upper extremity function,impaired coordination. These deficits affect their roles and responsibilities in which they were able to complete prior to admit. Raleigh Blari would continue to benefit from acute PT intervention to improve quality of life and focus on recovery of impairments. Once medically stable, recommending pt discharge to Home Health PT. Patient demonstrates improved cognitive status and command following this visit. Patient's wife in room reporting patient now closer to functional baseline and she feels confident in being able to care for him at home. Discharge recommendation changed to HHPT.    Rehab Prognosis: Fair; patient continues to benefit from acute skilled PT services to address these deficits and reach maximum level of function.  Patient remains most appropriate to discharge to home health PT.  Recent Surgery: * No surgery found *      Plan:     During this hospitalization, patient to be seen 3 x/week to address the identified rehab impairments via therapeutic activities,therapeutic exercises,neuromuscular re-education and progress toward the following goals:    · Plan of Care Expires:  02/01/22    Subjective     Chief Complaint: None verbalized   Patient/Family Comments/Goals: "Let me lay back down"  Pain/Comfort:  · Pain Rating 1: 0/10  · Pain Rating Post-Intervention 1: 0/10    Objective:     Communicated with RN " prior to session. Patient found HOB elevated with bed alarm,peripheral IV,Condom Catheter upon PT entry to room.     General Precautions: Standard, aspiration,fall,seizure   Orthopedic Precautions:N/A   Braces: N/A    Functional Mobility:  · Bed Mobility:     · Scooting: stand by assistance in supine to HOB  · Supine to Sit: with HOB elevated and wife's assistance  · Sit to Supine: minimum assistance  · Transfers:     · Sit to Stand: maximal assistance with hand-held assist on 1st trial, 2nd trial with wife's assistance  · Gait: Deferred, patient not ambulating at baseline  · Balance:   · Static Sitting: Good, able to maintain for 3 minute(s) with stand by assistance  · Dynamic Sitting: Fair: Patient accepts minimal challenge, contact guard assistance  · Static Standing: Poor, able to maintain for 45 seconds with maximal assistance  · Dynamic Standing: not assessed this visit    AM-PAC 6 CLICK MOBILITY  Turning over in bed (including adjusting bedclothes, sheets and blankets)?: 2  Sitting down on and standing up from a chair with arms (e.g., wheelchair, bedside commode, etc.): 2  Moving from lying on back to sitting on the side of the bed?: 2  Moving to and from a bed to a chair (including a wheelchair)?: 2  Need to walk in hospital room?: 1  Climbing 3-5 steps with a railing?: 1  Basic Mobility Total Score: 10     Therapeutic Activities and Exercises:  Patient educated on role of acute care PT and PT POC  Significant time spent discussing prior level of function with patient's spouse due to limited social history on eval. Patient's spouse reports she is home with him 24/7 and assists with ADLS and stand pivot transfers to w/c. Wife demonstrated ability to perform mobility safely during visit. Spouse feels she will be able to care for him at home and he is close to baseline    Patient left HOB elevated with all lines intact, call button in reach, bed alarm on and spouse present.    GOALS:   Multidisciplinary  Problems     Physical Therapy Goals        Problem: Physical Therapy Goal    Goal Priority Disciplines Outcome Goal Variances Interventions   Physical Therapy Goal     PT, PT/OT Ongoing, Progressing     Description: Goals to be met by: 1/15/2022     Patient will increase functional independence with mobility by performin. Supine to sit with minimum assistance  2. Sit to supine with minimum assistance  3. Roll to right with minimal assistance  4. Sit to stand transfer with maximal assistance - met 2022  Updated: minimum assistance   5. Bed to chair transfer with maximal assistance using LRAD as needed  6. Sitting at edge of bed x5 minutes with Minimal Assistance  7. Lower extremity exercise program x10 reps per handout, with independence                    Time Tracking:     PT Received On: 22  PT Start Time: 931     PT Stop Time: 947  PT Total Time (min): 16 min     Billable Minutes: Therapeutic Activity 16 min    Treatment Type: Evaluation  PT/PTA: PT     PTA Visit Number: 0     2022    Co-treatment performed for this visit due to patient need for two skilled therapists to ensure patient and staff safety and to accommodate for patient activity tolerance/pain management

## 2022-01-04 NOTE — ASSESSMENT & PLAN NOTE
-SBP < 160, prn labetalol and hydralazine  -SCDs, hold DVT chemoppx in setting of acute bleed  -Hold ASA  -CT 8 mm AoC SDH along the R posterior cerebral convexity with localized mass effect but no significant MLS  -Keppra 1 g bid   -PT/OT/SLP  -Repeat CT stable

## 2022-01-04 NOTE — ASSESSMENT & PLAN NOTE
- CTH revealed SDH along the R posterior cerebral convexity with localized mass effect but no significant MLS.   - Repeat CTH stable.

## 2022-01-04 NOTE — HOSPITAL COURSE
1/4/21: Participated w/ OT. Bed mob SBA- Ronaldo. Sit to stand maxA. No ambulation.    I personally discussed with the PA at Ms. Selvin's oncology office of Dr. Dang, NICOLE Olsen, who stated they are not planning on offering chemotherapy while the patient is at rehab and will restart chemotherapy once out of rehab.

## 2022-01-04 NOTE — SUBJECTIVE & OBJECTIVE
Interval History: Patient feels well, has no complaints, denies nausea, vomiting, SOB, has not had BM in several days      Review of Systems   Constitutional: Negative for fatigue and fever.   Respiratory: Negative for cough and shortness of breath.    Gastrointestinal: Negative for abdominal distention and abdominal pain.   Genitourinary: Negative for difficulty urinating, dysuria and frequency.   Musculoskeletal: Negative for arthralgias and back pain.   Neurological: Negative for dizziness and headaches.   Psychiatric/Behavioral: Positive for confusion.     Objective:     Vital Signs (Most Recent):  Temp: 98.4 °F (36.9 °C) (01/04/22 1556)  Pulse: 66 (01/04/22 1556)  Resp: 17 (01/04/22 1556)  BP: 128/62 (01/04/22 1556)  SpO2: 97 % (01/04/22 1556) Vital Signs (24h Range):  Temp:  [97.1 °F (36.2 °C)-98.6 °F (37 °C)] 98.4 °F (36.9 °C)  Pulse:  [50-67] 66  Resp:  [17-20] 17  SpO2:  [92 %-97 %] 97 %  BP: (101-129)/(55-74) 128/62     Weight: 75.3 kg (166 lb)  Body mass index is 29.41 kg/m².  No intake or output data in the 24 hours ending 01/04/22 1642   Physical Exam  Constitutional:       Appearance: Normal appearance.   Cardiovascular:      Rate and Rhythm: Normal rate and regular rhythm.      Pulses: Normal pulses.      Heart sounds: Normal heart sounds.   Pulmonary:      Effort: Pulmonary effort is normal.      Breath sounds: Normal breath sounds.   Abdominal:      General: Bowel sounds are normal. There is distension.      Palpations: Abdomen is soft.      Tenderness: There is no abdominal tenderness.   Skin:     General: Skin is warm and dry.      Capillary Refill: Capillary refill takes less than 2 seconds.   Neurological:      General: No focal deficit present.      Mental Status: He is alert. He is disoriented.      Cranial Nerves: No cranial nerve deficit.         Significant Labs:   All pertinent labs within the past 24 hours have been reviewed.  CBC:   Recent Labs   Lab 01/04/22  1027   WBC 9.19   HGB 11.8*    HCT 37.5*        CMP:   Recent Labs   Lab 01/04/22  1027      K 4.1      CO2 30*   *   BUN 14   CREATININE 1.2   CALCIUM 9.9   PROT 7.5   ALBUMIN 3.7   BILITOT 0.4   ALKPHOS 105   AST 16   ALT 15   ANIONGAP 9   EGFRNONAA >60.0       Significant Imaging: I have reviewed all pertinent imaging results/findings within the past 24 hours.

## 2022-01-04 NOTE — PLAN OF CARE
Goals reviewed and revised as appropriate. Continue POC. Discharge recommendation changed to HHPT due to improved cognitive status and appropriate family support.    Problem: Physical Therapy Goal  Goal: Physical Therapy Goal  Description: Goals to be met by: 1/15/2022     Patient will increase functional independence with mobility by performin. Supine to sit with minimum assistance  2. Sit to supine with minimum assistance  3. Roll to right with minimal assistance  4. Sit to stand transfer with maximal assistance - met 2022  Updated: minimum assistance   5. Bed to chair transfer with maximal assistance using LRAD as needed  6. Sitting at edge of bed x5 minutes with Minimal Assistance  7. Lower extremity exercise program x10 reps per handout, with independence   Outcome: Ongoing, Progressing

## 2022-01-04 NOTE — PT/OT/SLP PROGRESS
Occupational Therapy   Co-Treatment    Name: Raleigh Blair  MRN: [id number]  Admitting Diagnosis:  Tonic clonic seizures       Recommendations:     Discharge Recommendations: home with home health, 24 hr family assist  Discharge Equipment Recommendations:  none  Barriers to discharge: None    Assessment:     Raleigh Blair is a 71 y.o. male with a medical diagnosis of Tonic clonic seizures. He presents with performance deficits including weakness,impaired endurance,impaired self care skills,impaired functional mobilty,gait instability,impaired balance,decreased safety awareness,decreased lower extremity function,decreased upper extremity function,decreased coordination. Pt progressing toward goals and is near functional baseline. Pt would continue to benefit from OT to maximize functional independence and safety. Recommend home health with 24 hr family assistance upon D/C.     Rehab Prognosis:  Good; patient would benefit from acute skilled OT services to address these deficits and reach maximum level of function.       Plan:     Patient to be seen 3 x/week to address the above listed problems via self-care/home management,therapeutic activities,therapeutic exercises,neuromuscular re-education  · Plan of Care Expires: 02/02/22  · Plan of Care Reviewed with: patient,spouse    Subjective     Pain/Comfort:  · Pain Rating 1: 0/10  · Pain Rating Post-Intervention 1: 0/10    Objective:     Communicated with: RN prior to session. Patient found with HOB elevated with bed alarm,peripheral IV,Condom Catheter upon OT entry to room, spouse present.  Co-treatment performed with PT due to patient's multiple deficits requiring two skilled therapists to appropriately and safely assess patient's strength and endurance while facilitating functional tasks in addition to accommodating for patient's activity tolerance.     General Precautions: Standard, fall,aspiration,seizure   Orthopedic Precautions:N/A   Braces: N/A  Respiratory  Status: Room air     Occupational Performance:     Bed Mobility:    · Patient completed Scooting/Bridging with stand by assistance in supine to HOB  · Patient completed Supine to Sit with HOB elevated and wife's assistance  · Patient completed Sit to Supine with minimum assistance     Functional Mobility/Transfers:  · Patient completed Sit <> Stand Transfer with maximal assistance with hand-held assist from EOB and 2nd trial from EOB with wife's assistance  · Functional Mobility: Not attempted as pt does not ambulate at baseline    Activities of Daily Living:  · Not assessed this date      [facility] 6 Click ADL: 10    Treatment & Education:  Pt's spouse present throughout session-- she is home with him 24 hr and assists with ADLs and stand pivot transfers to W/C; pt able to sit EOB with SBA and practiced transfers in prep for returning home; spouse in agreement with home health plan as she reports pt is at baseline cognitively and functionally and she provides 24 hr care    Patient left HOB elevated with all lines intact, call button in reach, bed alarm on and spouse presentEducation:      GOALS:   Multidisciplinary Problems     Occupational Therapy Goals        Problem: Occupational Therapy Goal    Goal Priority Disciplines Outcome Interventions   Occupational Therapy Goal     OT, PT/OT Ongoing, Progressing    Description: Goals to be met by: 7 days (1/9/22)     Patient will increase functional independence with ADLs by performing:    UE Dressing with Minimal Assistance.  LE Dressing with Moderate Assistance.  Grooming while seated with Minimal Assistance.  Toileting from bedside commode with Minimal Assistance for hygiene and clothing management.   Sitting at edge of bed x10 minutes with Contact Guard Assistance.  Supine to sit with Minimal Assistance.  Stand pivot transfer to wheelchair with Minimal Assistance.                     Time Tracking:     OT Date of Treatment: 01/04/22  OT Start Time: 0931  OT Stop Time:  0948  OT Total Time (min): 17 min    Billable Minutes:Neuromuscular Re-education 8 minutes    OT/JORDON: OT          1/4/2022

## 2022-01-04 NOTE — HPI
Mr. Blair is a 72 yo M with PMHx of PVD, CHF, and recent L AKA who presents to [facility] for new onset seizures and an AoC SDH. He originally presented to to the ED at [facility] via EMS with the complaint of seizure. Per his wife, the patient was in his normal state health yesterday and this morning.  She heard the bed shaking and found her  kicking his right leg and shaking his right arm in a manner most consistent with a tonic-clonic seizure approximately 1 hour prior to arrival.  Patient's wife states that she called EMS and upon EMS arrival patient was awake but postictal.  He was then being transported to the ambulance where he reportedly had another seizure and was given 2.5 mg of Versed.  Per chart review, the patient's wife states he is currently receiving antibiotics via a PICC for infected left AKA. She also denied any history of seizures or recent illness. Of note, the patient had a stroke approximately 10 years ago  for which he has residual deficit on the left side of his body and takes daily ASA. He was loaded with 3 g keppra prior to transfer to [facility].     He is being admitted to [facility] for hourly neuromonitoring and a higher level of care.

## 2022-01-04 NOTE — PROGRESS NOTES
[facility] - Neurosurgery ([facility])  [facility] Medicine  Progress Note    Patient Name: Raleigh Blair  MRN: [id number]  Patient Class: IP- Inpatient   Admission Date: 12/31/2021  Length of Stay: 4 days  Attending Physician: Christian Zhou MD  Primary Care Provider: Joss Funk MD        Subjective:     Principal Problem:Tonic clonic seizures        HPI:  Mr. Blair is a 70 yo M with PMHx of PVD, CHF, and recent L AKA who presents to [facility] for new onset seizures and an AoC SDH. He originally presented to to the ED at [facility] via EMS with the complaint of seizure. Per his wife, the patient was in his normal state health yesterday and this morning.  She heard the bed shaking and found her  kicking his right leg and shaking his right arm in a manner most consistent with a tonic-clonic seizure approximately 1 hour prior to arrival.  Patient's wife states that she called EMS and upon EMS arrival patient was awake but postictal.  He was then being transported to the ambulance where he reportedly had another seizure and was given 2.5 mg of Versed.  Per chart review, the patient's wife states he is currently receiving antibiotics via a PICC for infected left AKA. She also denied any history of seizures or recent illness. Of note, the patient had a stroke approximately 10 years ago  for which he has residual deficit on the left side of his body and takes daily ASA. He was loaded with 3 g keppra prior to transfer to [facility].     He is being admitted to [facility] for hourly neuromonitoring and a higher level of care.       Overview/Hospital Course:  01/01/2022: EEG negative. Discontinue. Add Norvasc and SQH. Start TF. Step down orders to hospital medicine placed.   01/02/2022: Decrease coreg. D/c Norvasc. Boarding for hospital medicine.   01/03/2022 Coreg decreased to 3.125 BID. Continue Digoxin. Continues to board for hospital medicine   01/04/2022: Continue to monitor, bowel regimen      Interval History: Patient feels well, has no  complaints, denies nausea, vomiting, SOB, has not had BM in several days      Review of Systems   Constitutional: Negative for fatigue and fever.   Respiratory: Negative for cough and shortness of breath.    Gastrointestinal: Negative for abdominal distention and abdominal pain.   Genitourinary: Negative for difficulty urinating, dysuria and frequency.   Musculoskeletal: Negative for arthralgias and back pain.   Neurological: Negative for dizziness and headaches.   Psychiatric/Behavioral: Positive for confusion.     Objective:     Vital Signs (Most Recent):  Temp: 98.4 °F (36.9 °C) (01/04/22 1556)  Pulse: 66 (01/04/22 1556)  Resp: 17 (01/04/22 1556)  BP: 128/62 (01/04/22 1556)  SpO2: 97 % (01/04/22 1556) Vital Signs (24h Range):  Temp:  [97.1 °F (36.2 °C)-98.6 °F (37 °C)] 98.4 °F (36.9 °C)  Pulse:  [50-67] 66  Resp:  [17-20] 17  SpO2:  [92 %-97 %] 97 %  BP: (101-129)/(55-74) 128/62     Weight: 75.3 kg (166 lb)  Body mass index is 29.41 kg/m².  No intake or output data in the 24 hours ending 01/04/22 1642   Physical Exam  Constitutional:       Appearance: Normal appearance.   Cardiovascular:      Rate and Rhythm: Normal rate and regular rhythm.      Pulses: Normal pulses.      Heart sounds: Normal heart sounds.   Pulmonary:      Effort: Pulmonary effort is normal.      Breath sounds: Normal breath sounds.   Abdominal:      General: Bowel sounds are normal. There is distension.      Palpations: Abdomen is soft.      Tenderness: There is no abdominal tenderness.   Skin:     General: Skin is warm and dry.      Capillary Refill: Capillary refill takes less than 2 seconds.   Neurological:      General: No focal deficit present.      Mental Status: He is alert. He is disoriented.      Cranial Nerves: No cranial nerve deficit.         Significant Labs:   All pertinent labs within the past 24 hours have been reviewed.  CBC:   Recent Labs   Lab 01/04/22  1027   WBC 9.19   HGB 11.8*   HCT 37.5*        CMP:   Recent Labs    Lab 01/04/22  1027      K 4.1      CO2 30*   *   BUN 14   CREATININE 1.2   CALCIUM 9.9   PROT 7.5   ALBUMIN 3.7   BILITOT 0.4   ALKPHOS 105   AST 16   ALT 15   ANIONGAP 9   EGFRNONAA >60.0       Significant Imaging: I have reviewed all pertinent imaging results/findings within the past 24 hours.      Assessment/Plan:      * Tonic clonic seizures  70 y/o M with new onset tonic clonic seizure-like activity of the R side witnessed by both his wife and EMS. Given 2.5 versed en route to [facility] and loaded with 3 g keppra.   -cEEG negative 1/1, discontinue  -No prior history of seizures   -Keppra 1 g BID   -boarding for hospital medicine       Type 2 diabetes mellitus  -A1c 7.1 on admission  -SSI    Hyperlipidemia  Continue statin      Essential hypertension  SBP < 160  Decrease home carvedilol dose to 3.125mg BID  D/c Norvasc      Hypothyroidism  TSH 54, free T4 0.48 on admission  Increase home synthroid to 50mcg daily      CHF (congestive heart failure)  -History of CHF per chart review, no ECHO available  -ECHO, CXR   -continue home digoxin, digoxin level 0.6    S/P AKA (above knee amputation) unilateral, left  History of L AKA with poor wound healing  -wound care consult  -currently afebrile with WBC WNL  -Finished course of abx per wife, will dc picc at discharge    Acute on chronic intracranial subdural hematoma  -SBP < 160, prn labetalol and hydralazine  -SCDs, hold DVT chemoppx in setting of acute bleed  -Hold ASA  -CT 8 mm AoC SDH along the R posterior cerebral convexity with localized mass effect but no significant MLS  -Keppra 1 g bid   -PT/OT/SLP  -Repeat CT stable    History of CVA with residual deficit  CTH with multifocal large remote appearing infarcts, R>L with generalized cerebral volume loss and chronic ischemic changes  Statin  Hold ASA in setting of AoC SDH  q4h neuro checks  PT/OT/SLP  Repeat CT stable        VTE Risk Mitigation (From admission, onward)         Ordered     heparin  (porcine) injection 5,000 Units  Every 8 hours         01/01/22 1011     IP VTE LOW RISK PATIENT  Once         12/31/21 1346     Place sequential compression device  Until discontinued         12/31/21 1346                Discharge Planning   DEBORA: 1/12/2022     Code Status: DNR   Is the patient medically ready for discharge?: No    Reason for patient still in hospital (select all that apply): Patient trending condition and Treatment  Discharge Plan A: Skilled Nursing Facility                  Christian Zhou MD  Department of Hospital Medicine   [facility] - Neurosurgery ([facility])

## 2022-01-04 NOTE — PHYSICIAN QUERY
PT Name: Raleigh Blair  MR #: [id number]     DOCUMENTATION CLARIFICATION     CDS/: Indira Mccloud RN, CDS               Contact information: [email or web address]  This form is a permanent document in the medical record.     Query Date: January 4, 2022    By submitting this query, we are merely seeking further clarification of documentation.  Please utilize your independent clinical judgment when addressing the question(s) below.    The Medical Record contains the following   Indicators Supporting Clinical Findings Location in Medical Record   x Heart Failure documented --PMHx CHF   --CHF (congestive heart failure)  -History of CHF per chart review, no ECHO available  -Repeat ECHO, CXR pending       -resume home digoxin, digoxin level pending   [facility] H&P 12/31-> 1/3    BNP     x EF/Echo Challenging study.  The left ventricle is normal in size with probably normal systolic function.  The estimated ejection fraction is 50% or higher. The septum appears hypokinetic. Not all segments are visualized.  Normal left ventricular diastolic function.  Normal right ventricular size with normal right ventricular systolic function.  There is no significant tricuspid regurgitation and therefore the pulmonary artery systolic pressure cannot be reported.  IVC could not be visualized.  Small posterolateral pericardial effusion   TTE 1/1/22   x Radiology findings --The cardiomediastinal silhouette, osseous and soft tissue structures are normal.   CXR 12/31    Subjective/Objective Respiratory Conditions      Recent/Current MI      Heart Transplant, LVAD      Edema, JVD      Ascites     x Diuretics/Meds --Lipitor 20mg PO qhs  --Coreg 12.5mg PO BID  --Lanoxin 0.125mcg PO QD    --Coreg 3.125 mg PO BID Home meds per H&P        MAR        Other Treatment     x Other --Hyperlipidemia  --Essential hypertension   [facility] H&P 12/31-> 1/3     Heart failure is a clinical diagnosis which includes symptomatic fluid retention, elevated  intracardiac pressures, and/or the inability of the heart to deliver adequate blood flow.    Heart Failure with reduced Ejection Fraction (HFrEF) or Systolic Heart Failure (loses ability to contract normally, EF is <40%)    Heart Failure with preserved Ejection Fraction (HFpEF) or Diastolic Heart Failure (stiff ventricles, does not relax properly, EF is >50%)     Heart Failure with Combined Systolic and Diastolic Failure (stiff ventricles, does not relax properly and EF is <50%)    Mid-range or mildly reduced ejection fraction (HFmrEF) is classified as systolic heart failure.   Common clues to acute exacerbation:  Rapidly progressive symptoms (w/in 2 weeks of presentation), using IV diuretics, using supplemental O2, pulmonary edema on Xray, new or worsening pleural effusion, +JVD or other signs of volume overload, MI w/in 4 weeks, and/or BNP >500  The clinical guidelines noted are only system guidelines, and do not replace the providers clinical judgment.    Provider, please specify the diagnosis associated with the above clinical findings.    [  x ]  Chronic Diastolic Heart Failure (HFpEF) - preexisting and stable     [   ]  Heart Failure Ruled Out     [   ]  Other (please specify): __________  _________________________   [  ]  Clinically Undetermined       Please document in your progress notes daily for the duration of treatment until resolved and include in your discharge summary.    References:  American Heart Association editorial staff. (2017, May). Ejection Fraction Heart Failure Measurement. American Heart Association. https://www.heart.org/en/health-topics/heart-failure/diagnosing-heart-failure/ejection-fraction-heart-failure-measurement#:~:text=Ejection%20fraction%20(EF)%20is%20a,pushed%20out%20with%20each%20heartbeat  LEWIS Flores (2020, December 15). Heart failure with preserved ejection fraction: Clinical manifestations and diagnosis. UpToDate.  https://www.Moodyo.Gigwalk/contents/heart-failure-with-preserved-ejection-fraction-clinical-manifestations-and-diagnosis.  ICD-10-CM/PCS Coding Clinic Third Quarter ICD-10, Effective with discharges: September 8, 2020 [facility] [employer] § Heart failure with mid-range or mildly reduced ejection fraction (2020).  Form No. 66957

## 2022-01-04 NOTE — ASSESSMENT & PLAN NOTE
History of L AKA with poor wound healing  -wound care consult  -currently afebrile with WBC WNL  -Finished course of abx per wife, will dc picc at discharge

## 2022-01-04 NOTE — HOSPITAL COURSE
01/01/2022: EEG negative. Discontinue. Add Norvasc and SQH. Start TF. Step down orders to hospital medicine placed.   01/02/2022: Decrease coreg. D/c Norvasc. Boarding for hospital medicine.   01/03/2022 Coreg decreased to 3.125 BID. Continue Digoxin. Continues to board for hospital medicine   01/04/2022: Continue to monitor, bowel regimen

## 2022-01-04 NOTE — CONSULTS
[facility] ([facility])  Physical Medicine & Rehab  Consult Note    Patient Name: Raleigh Blair  MRN: [id number]  Admission Date: 12/31/2021  Hospital Length of Stay: 4 days  Attending Physician: Christian Zhou MD     Inpatient consult to Physical Medicine & Rehabilitation  Consult performed by: Gwen Kimble NP  Consult requested by:  Christian Zhou MD    Collaborating Physician: Ely Cason MD  Reason for Consult:  Assess rehabilitation needs     Consults  Subjective:     Principal Problem: Tonic clonic seizures    HPI: Raleigh Blair is a 71-year-old male with PMHx of CHF, PVD, CVA, and recent L AKA. [place] [facility] [place] AMS and seizure activity. CTH revealed SDH along the R posterior cerebral convexity with localized mass effect but no significant MLS. Continue Keppra for seizure activity. Repeat CTH stable.      Functional History: Patient lives with spouse in a single story home. Prior to admission, Needs assistance with adl's and transfers. DME: w/c, sabi walker.       Hospital Course: 1/4/21: Participated w/ OT. Bed mob SBA- Ronaldo. Sit to stand maxA. No ambulation.       Past Medical History:   Diagnosis Date    Congestive heart failure     Peripheral vascular disease     S/P AKA (above knee amputation)     Type 2 diabetes mellitus 1/2/2022     Past Surgical History:   Procedure Laterality Date    AMPUTATION      HIP SURGERY       Review of patient's allergies indicates:  No Known Allergies    Scheduled Medications:    atorvastatin  20 mg Oral Daily    bisacodyL  5 mg Oral Once    carvediloL  3.125 mg Oral BID    digoxin  0.125 mg Oral Daily    DULoxetine  60 mg Oral BID    gabapentin  300 mg Oral Q8H    glycerin 99.5%  100 mL Rectal ED 1 Time    And    magnesium citrate  296 mL Rectal ED 1 Time    And    sodium chloride 0.9%  500 mL Rectal ED 1 Time    heparin (porcine)  5,000 Units Subcutaneous Q8H    levETIRAcetam  1,000 mg Oral BID    [START ON 1/5/2022]  levothyroxine  50 mcg Oral Before breakfast    mupirocin   Nasal BID    OLANZapine  2.5 mg Oral Daily    polyethylene glycol  17 g Oral BID    senna-docusate 8.6-50 mg  2 tablet Oral BID    silodosin  4 mg Oral Daily    sorbitoL  15 mL Oral Once       PRN Medications: dextrose 50%, glucagon (human recombinant), hydrALAZINE, insulin aspart U-100, labetalol, sodium chloride 0.9%    Family History     Problem Relation (Age of Onset)    Heart disease Father        Tobacco Use    Smoking status: Current Every Day Smoker     Packs/day: 0.25    Smokeless tobacco: Current User   Substance and Sexual Activity    Alcohol use: Yes     Comment: occassionally    Drug use: Never    Sexual activity: Not Currently     Review of Systems   Constitutional: Positive for activity change.   HENT: Positive for trouble swallowing.    Musculoskeletal: Positive for gait problem.   Neurological: Positive for weakness. Negative for headaches.   Psychiatric/Behavioral: Negative for agitation and behavioral problems.     Objective:     Vital Signs (Most Recent):  Temp: 97.4 °F (36.3 °C) (01/04/22 0835)  Pulse: 60 (01/04/22 0835)  Resp: 18 (01/04/22 0835)  BP: 129/60 (01/04/22 0835)  SpO2: (!) 94 % (01/04/22 0835)    Vital Signs (24h Range):  Temp:  [97.1 °F (36.2 °C)-97.6 °F (36.4 °C)] 97.4 °F (36.3 °C)  Pulse:  [50-60] 60  Resp:  [12-20] 18  SpO2:  [92 %-99 %] 94 %  BP: (101-135)/(53-74) 129/60     Body mass index is 29.41 kg/m².    Physical Exam  Vitals and nursing note reviewed.   HENT:      Nose: Nose normal.      Mouth/Throat:      Mouth: Mucous membranes are moist.   Eyes:      Extraocular Movements: Extraocular movements intact.      Pupils: Pupils are equal, round, and reactive to light.   Pulmonary:      Effort: Pulmonary effort is normal. No respiratory distress.   Musculoskeletal:      Comments: Generalized weakness and deconditioned    Neurological:      Mental Status: He is alert.      Comments: Following commands  Slower  to answer questions   Psychiatric:         Mood and Affect: Mood normal.     Diagnostic Results:   Labs: Reviewed  ECG: Reviewed  CT: Reviewed    Assessment/Plan:     * Tonic clonic seizures  - Continue Keppra for seizure activity.     Impaired mobility and activities of daily living  - Related to prolonged/acute hospital course.     Recommendations  -  Encourage mobility, OOB in chair at least 3 hours per day, and early ambulation as appropriate  -  PT/OT evaluate and treat  -  Pain management  -  Monitor for and prevent skin breakdown and pressure ulcers  · Early mobility, repositioning/weight shifting every 20-30 minutes when sitting, turn patient every 2 hours, proper mattress/overlay and chair cushioning, pressure relief/heel protector boots  -  DVT prophylaxis    -  Reviewed discharge options (IP rehab, SNF, HH therapy, and OP therapy)    Acute on chronic intracranial subdural hematoma  - CTH revealed SDH along the R posterior cerebral convexity with localized mass effect but no significant MLS.   - Repeat CTH stable.      PM&R Recommendation:     At this time, the PM&R team has reviewed this patient's ongoing medical case including inpatient diagnosis, medical history, clinical examination, labs, vitals, current social and functional history to provide the post-acute recommendation as follows:     RECOMMENDATIONS:skilled nursing facility due to patient poor tolerance for consistent therapy/poor potential for improvement with rehabilitation    We will sign off.     Thank you for your consult.     Gwen Kimble NP  Department of Physical Medicine & Rehab  [facility] ([facility])

## 2022-01-04 NOTE — PLAN OF CARE
POC reviewed. AOx1. VSS, tolerating diet, aspiration precautions, elevated HOB . Frequent rroundings. All safety measures implemented, continuous education, reoriented patient as needed. Frequent rounding. Ongoing discharge planning.

## 2022-01-04 NOTE — PT/OT/SLP PROGRESS
Speech Language Pathology Treatment/ Discharge Summary     Patient Name:  Raleigh Blair   MRN:  [id number]  Admitting Diagnosis: Tonic clonic seizures    Recommendations:                 General Recommendations:  Dysphagia therapy  Diet recommendations:  Mechanical soft, Liquid Diet Level: Thin   Aspiration Precautions: 1 bite/sip at a time, Meds crushed in puree and Standard aspiration precautions   General Precautions: Standard,    Communication strategies:  none    Subjective     Pt awake and alert   Spouse at the bedside     Pain/Comfort:  Pain Rating 1: 0/10  Pain Rating Post-Intervention 1: 0/10    Respiratory Status: Nasal cannula, flow   L/min    Objective:     Has the patient been evaluated by SLP for swallowing?   Yes  Keep patient NPO? No     Pt awake and alert. Pt seen following AM meal. Per spouse report pt daughter's were at the bedside last evening and report pt chewing meal slower than usual but as meal progressed he became more timely with his chewing. Pt spouse reporting she feels pt is nearing his baseline. Pt continues to exhibit sigificnalty improved alertness. Pt offered trials of this liquids via straw and regular solids x2. Pt continues to exhibit mildly prolonged yet functional oral prep with regular solids and no overt clinical signs of aspiration appreciated across all trials presented. SLP discussed with spouse at this time it appears continuing on chopped soft solids while remaining in the hospital would be safest and spouse in agreement. SLP discussed once pt discharges spouse able to gradually re-introduce home prepared regular solids as appropriate/ready.  Pt appearing at/or near baseline without need for continued speech service at this time. Spouse demonstrated understanding and agreement with plan.      Assessment:     Raleigh Blair is a 71 y.o. male with an SLP diagnosis of Dysphagia   safe to continue thin liquids and mechanical soft solids.     Goals:   Multidisciplinary  Problems     SLP Goals        Problem: SLP Goal    Goal Priority Disciplines Outcome   SLP Goal     SLP    Description: Speech Language Pathology Goals  Goals expected to be met by 1/10/22    1. Pt will tolerate diet of  thin liqiuds and mechanical soft solids without overt clinical signs of aspiration   2. Pt will participate in ongoing assessment of swallow function to help determine least restrictive diet                   Plan:     · Patient to be seen:  4 x/week   · Plan of Care expires:     · Plan of Care reviewed with:  patient   · SLP Follow-Up:  No       Discharge recommendations:  home   Barriers to Discharge:  TBD    Time Tracking:     SLP Treatment Date:   01/04/22  Speech Start Time:  0920  Speech Stop Time:  0935     Speech Total Time (min):  15 min    Billable Minutes: Treatment Swallowing Dysfunction 7 and Self Care/Home Management Training 8    01/04/2022

## 2022-01-04 NOTE — ASSESSMENT & PLAN NOTE
72 y/o M with new onset tonic clonic seizure-like activity of the R side witnessed by both his wife and EMS. Given 2.5 versed en route to [facility] and loaded with 3 g keppra.   -cEEG negative 1/1, discontinue  -No prior history of seizures   -Keppra 1 g BID   -boarding for [facility]

## 2022-01-04 NOTE — ASSESSMENT & PLAN NOTE
CTH with multifocal large remote appearing infarcts, R>L with generalized cerebral volume loss and chronic ischemic changes  Statin  Hold ASA in setting of AoC SDH  q4h neuro checks  PT/OT/SLP  Repeat CT stable

## 2022-01-04 NOTE — SUBJECTIVE & OBJECTIVE
Past Medical History:   Diagnosis Date    Congestive heart failure     Peripheral vascular disease     S/P AKA (above knee amputation)     Type 2 diabetes mellitus 1/2/2022     Past Surgical History:   Procedure Laterality Date    AMPUTATION      HIP SURGERY       Review of patient's allergies indicates:  No Known Allergies    Scheduled Medications:    atorvastatin  20 mg Oral Daily    bisacodyL  5 mg Oral Once    carvediloL  3.125 mg Oral BID    digoxin  0.125 mg Oral Daily    DULoxetine  60 mg Oral BID    gabapentin  300 mg Oral Q8H    glycerin 99.5%  100 mL Rectal ED 1 Time    And    magnesium citrate  296 mL Rectal ED 1 Time    And    sodium chloride 0.9%  500 mL Rectal ED 1 Time    heparin (porcine)  5,000 Units Subcutaneous Q8H    levETIRAcetam  1,000 mg Oral BID    [START ON 1/5/2022] levothyroxine  50 mcg Oral Before breakfast    mupirocin   Nasal BID    OLANZapine  2.5 mg Oral Daily    polyethylene glycol  17 g Oral BID    senna-docusate 8.6-50 mg  2 tablet Oral BID    silodosin  4 mg Oral Daily    sorbitoL  15 mL Oral Once       PRN Medications: dextrose 50%, glucagon (human recombinant), hydrALAZINE, insulin aspart U-100, labetalol, sodium chloride 0.9%    Family History     Problem Relation (Age of Onset)    Heart disease Father        Tobacco Use    Smoking status: Current Every Day Smoker     Packs/day: 0.25    Smokeless tobacco: Current User   Substance and Sexual Activity    Alcohol use: Yes     Comment: occassionally    Drug use: Never    Sexual activity: Not Currently     Review of Systems   Constitutional: Positive for activity change.   HENT: Positive for trouble swallowing.    Musculoskeletal: Positive for gait problem.   Neurological: Positive for weakness. Negative for headaches.   Psychiatric/Behavioral: Negative for agitation and behavioral problems.     Objective:     Vital Signs (Most Recent):  Temp: 97.4 °F (36.3 °C) (01/04/22 0835)  Pulse: 60 (01/04/22  0835)  Resp: 18 (01/04/22 0835)  BP: 129/60 (01/04/22 0835)  SpO2: (!) 94 % (01/04/22 0835)    Vital Signs (24h Range):  Temp:  [97.1 °F (36.2 °C)-97.6 °F (36.4 °C)] 97.4 °F (36.3 °C)  Pulse:  [50-60] 60  Resp:  [12-20] 18  SpO2:  [92 %-99 %] 94 %  BP: (101-135)/(53-74) 129/60     Body mass index is 29.41 kg/m².    Physical Exam  Vitals and nursing note reviewed.   HENT:      Nose: Nose normal.      Mouth/Throat:      Mouth: Mucous membranes are moist.   Eyes:      Extraocular Movements: Extraocular movements intact.      Pupils: Pupils are equal, round, and reactive to light.   Pulmonary:      Effort: Pulmonary effort is normal. No respiratory distress.   Musculoskeletal:      Comments: Generalized weakness and deconditioned    Neurological:      Mental Status: He is alert.      Comments: Following commands  Slower to answer questions   Psychiatric:         Mood and Affect: Mood normal.       NEUROLOGICAL EXAMINATION:     CRANIAL NERVES     CN III, IV, VI   Pupils are equal, round, and reactive to light.      Diagnostic Results: Labs: Reviewed  ECG: Reviewed  CT: Reviewed

## 2022-01-04 NOTE — HPI
Raleigh Blair is a 71-year-old male with PMHx of CHF, PVD, CVA, and recent L AKA. [place] [facility] A[place] and seizure activity. CTH revealed SDH along the R posterior cerebral convexity with localized mass effect but no significant MLS. Continue Keppra for seizure activity. Repeat CTH stable.      Functional History: Patient lives with spouse in a single story home. Prior to admission, Needs assistance with adl's and transfers. DME: w/c, sabi walker.

## 2022-01-05 VITALS
HEIGHT: 63 IN | SYSTOLIC BLOOD PRESSURE: 121 MMHG | OXYGEN SATURATION: 99 % | DIASTOLIC BLOOD PRESSURE: 60 MMHG | HEART RATE: 56 BPM | TEMPERATURE: 98 F | WEIGHT: 166 LBS | BODY MASS INDEX: 29.41 KG/M2 | RESPIRATION RATE: 17 BRPM

## 2022-01-05 LAB
BACTERIA BLD CULT: NORMAL
BACTERIA BLD CULT: NORMAL

## 2022-01-05 PROCEDURE — 94668 MNPJ CHEST WALL SBSQ: CPT

## 2022-01-05 PROCEDURE — 99239 PR HOSPITAL DISCHARGE DAY,>30 MIN: ICD-10-PCS | Mod: ,,, | Performed by: STUDENT IN AN ORGANIZED HEALTH CARE EDUCATION/TRAINING PROGRAM

## 2022-01-05 PROCEDURE — 99900035 HC TECH TIME PER 15 MIN (STAT)

## 2022-01-05 PROCEDURE — 25000003 PHARM REV CODE 250

## 2022-01-05 PROCEDURE — 25000003 PHARM REV CODE 250: Performed by: STUDENT IN AN ORGANIZED HEALTH CARE EDUCATION/TRAINING PROGRAM

## 2022-01-05 PROCEDURE — 63600175 PHARM REV CODE 636 W HCPCS

## 2022-01-05 PROCEDURE — 99239 HOSP IP/OBS DSCHRG MGMT >30: CPT | Mod: ,,, | Performed by: STUDENT IN AN ORGANIZED HEALTH CARE EDUCATION/TRAINING PROGRAM

## 2022-01-05 PROCEDURE — 94761 N-INVAS EAR/PLS OXIMETRY MLT: CPT

## 2022-01-05 RX ORDER — LEVETIRACETAM 1000 MG/1
1000 TABLET ORAL 2 TIMES DAILY
Qty: 60 TABLET | Refills: 11 | Status: SHIPPED | OUTPATIENT
Start: 2022-01-05 | End: 2023-01-05

## 2022-01-05 RX ORDER — CARVEDILOL 3.12 MG/1
3.12 TABLET ORAL 2 TIMES DAILY
Qty: 60 TABLET | Refills: 11 | Status: SHIPPED | OUTPATIENT
Start: 2022-01-05 | End: 2023-01-05

## 2022-01-05 RX ORDER — SYRING-NEEDL,DISP,INSUL,0.3 ML 29 G X1/2"
296 SYRINGE, EMPTY DISPOSABLE MISCELLANEOUS ONCE
Status: COMPLETED | OUTPATIENT
Start: 2022-01-05 | End: 2022-01-05

## 2022-01-05 RX ADMIN — LEVETIRACETAM 1000 MG: 500 TABLET, FILM COATED ORAL at 08:01

## 2022-01-05 RX ADMIN — HEPARIN SODIUM 5000 UNITS: 5000 INJECTION INTRAVENOUS; SUBCUTANEOUS at 02:01

## 2022-01-05 RX ADMIN — DULOXETINE 60 MG: 60 CAPSULE, DELAYED RELEASE ORAL at 08:01

## 2022-01-05 RX ADMIN — DIGOXIN 0.12 MG: 125 TABLET ORAL at 08:01

## 2022-01-05 RX ADMIN — DOCUSATE SODIUM 50 MG AND SENNOSIDES 8.6 MG 2 TABLET: 8.6; 5 TABLET, FILM COATED ORAL at 08:01

## 2022-01-05 RX ADMIN — HEPARIN SODIUM 5000 UNITS: 5000 INJECTION INTRAVENOUS; SUBCUTANEOUS at 05:01

## 2022-01-05 RX ADMIN — POLYETHYLENE GLYCOL 3350 17 G: 17 POWDER, FOR SOLUTION ORAL at 08:01

## 2022-01-05 RX ADMIN — SILODOSIN 4 MG: 4 CAPSULE ORAL at 08:01

## 2022-01-05 RX ADMIN — MUPIROCIN: 20 OINTMENT TOPICAL at 08:01

## 2022-01-05 RX ADMIN — CARVEDILOL 3.12 MG: 3.12 TABLET, FILM COATED ORAL at 08:01

## 2022-01-05 RX ADMIN — OLANZAPINE 2.5 MG: 2.5 TABLET, FILM COATED ORAL at 08:01

## 2022-01-05 RX ADMIN — LEVOTHYROXINE SODIUM 50 MCG: 50 TABLET ORAL at 05:01

## 2022-01-05 RX ADMIN — GABAPENTIN 300 MG: 250 SOLUTION ORAL at 08:01

## 2022-01-05 RX ADMIN — Medication 296 ML: at 08:01

## 2022-01-05 RX ADMIN — ATORVASTATIN CALCIUM 20 MG: 20 TABLET, FILM COATED ORAL at 08:01

## 2022-01-05 NOTE — DISCHARGE INSTRUCTIONS
Mr. Blair was admitted for seizures, he is now on an antiseizure medications called keppra and he will need follow up with a neurology specialist. He has also completed his course of ertapenem. We have removed his PICC line prior to discharge. His blood pressure was low in the hospital so we have made some changes to his blood pressure medications, he should discuss resuming these at his next primary care visit. We have also made a referral so he can see a neurologist.

## 2022-01-05 NOTE — DISCHARGE SUMMARY
Discharge Summary  Hospital Medicine  [facility]      Attending Physician on Discharge: Christian Zhou MD  [place] Medicine [place] [facility]  Date of Admission:  12/31/2021     Date of Discharge:   01/05/2022    Code status: DNR (Do Not Resuscitate)    Active Hospital Problems    Diagnosis  POA    *Tonic clonic seizures [G40.409]  Yes    Impaired mobility and activities of daily living [Z74.09, Z78.9]  Unknown    Type 2 diabetes mellitus [E11.9]  Yes    History of CVA with residual deficit [I69.30]  Not Applicable    Acute on chronic intracranial subdural hematoma [I62.01, I62.03]  Yes    S/P AKA (above knee amputation) unilateral, left [Z89.612]  Not Applicable    CHF (congestive heart failure) [I50.9]  Yes    Hypothyroidism [E03.9]  Yes    Essential hypertension [I10]  Yes    Hyperlipidemia [E78.5]  Yes    Elevated lactic acid level [R79.89]  Yes    Peripheral vascular disease [I73.9]  Yes      Resolved Hospital Problems    Diagnosis Date Resolved POA    Right-sided cerebrovascular accident (CVA) [I63.9] 12/31/2021 Yes    CVA (cerebral vascular accident) [I63.9] 12/31/2021 Yes    Non-healing amputation site [T87.89] 12/31/2021 Yes       Consults: [facility]     History of Present Illness:  Mr. Blair is a 72 yo M with PMHx of PVD, CHF, and recent L AKA who presents to [facility] for new onset seizures and an AoC SDH. He originally presented to to the ED at [facility] via EMS with the complaint of seizure. Per his wife, the patient was in his normal state health yesterday and this morning.  She heard the bed shaking and found her  kicking his right leg and shaking his right arm in a manner most consistent with a tonic-clonic seizure approximately 1 hour prior to arrival.  Patient's wife states that she called EMS and upon EMS arrival patient was awake but postictal.  He was then being transported to the ambulance where he reportedly had another seizure and was given 2.5 mg of Versed.   Per chart review, the patient's wife states he is currently receiving antibiotics via a PICC for infected left AKA. She also denied any history of seizures or recent illness. Of note, the patient had a stroke approximately 10 years ago  for which he has residual deficit on the left side of his body and takes daily ASA. He was loaded with 3 g keppra prior to [place] [facility].     He is being admitted to [facility] for hourly neuromonitoring and a higher level of care.         Overview/Hospital Course:  01/01/2022: EEG negative. Discontinue. Add Norvasc and SQH. Start TF. Step down orders to hospital medicine placed.   01/02/2022: Decrease coreg. D/c Norvasc. Boarding for hospital medicine.   01/03/2022 Coreg decreased to 3.125 BID. Continue Digoxin. Continues to board for hospital medicine   01/04/2022: Continue to monitor, bowel regimen  01/05/2022: Had BM    Hospital Course by Problem:  Tonic clonic seizures  72 y/o M with new onset tonic clonic seizure-like activity of the R side witnessed by both his wife and EMS. Given 2.5 versed en route to [facility] and loaded with 3 g keppra.   -No prior history of seizures   -Keppra 1 g BID   -Neurology follow up at discharge        Type 2 diabetes mellitus  Resume home regimen     Hyperlipidemia  Continue statin        Essential hypertension  SBP < 160  Decrease home carvedilol dose to 3.125mg BID  D/c Norvasc        Hypothyroidism  TSH 54, free T4 0.48 on admission  Increase home synthroid to 50mcg daily  - Follow up with PCP, outpatient TSH        CHF (congestive heart failure)  -History of CHF per chart review, EF 50%  -continue home digoxin, digoxin level 0.6     S/P AKA (above knee amputation) unilateral, left  History of L AKA with poor wound healing  -wound care consult  -currently afebrile with WBC WNL  -Finished course of abx 1/3 per infusion center  -removed PICC at discharge     Acute on chronic intracranial subdural hematoma  -SBP < 160  -SCDs, hold DVT chemoppx in setting  of acute bleed  -Hold ASA  -CT 8 mm AoC SDH along the R posterior cerebral convexity with localized mass effect but no significant MLS  -Keppra 1 g bid at discharge  -PT/OT/SLP  -Repeat CT stable     History of CVA with residual deficit  CTH with multifocal large remote appearing infarcts, R>L with generalized cerebral volume loss and chronic ischemic changes  Statin  Hold ASA in setting of AoC SDH  PT/OT/SLP HH       Repeat CT stable     Laboratory Values:  Recent Labs   Lab 01/01/22  0256 01/02/22  0432 01/04/22  1027   WBC 11.52 10.89 9.19   HGB 11.4* 11.0* 11.8*   HCT 35.5* 35.5* 37.5*    304 291     Recent Labs   Lab 01/01/22  0256 01/02/22  0432 01/04/22  1027    143 142   K 3.6 4.6 4.1    104 103   CO2 27 28 30*   BUN 15 18 14   CREATININE 1.1 1.4 1.2   * 115* 151*   CALCIUM 9.5 9.6 9.9   MG 2.2 2.6 2.3   PHOS 1.8* 3.2 2.6*     Recent Labs   Lab 12/31/21  1002 12/31/21  1019 01/01/22  0256 01/02/22 0432 01/04/22  1027   ALKPHOS   < >  --  93 97 105   ALT   < >  --  13 14 15   AST   < >  --  18 17 16   ALBUMIN   < >  --  3.5 3.6 3.7   PROT   < >  --  7.6 7.3 7.5   BILITOT   < >  --  0.3 0.4 0.4   INR  --  1.1  --   --   --     < > = values in this interval not displayed.      Recent Labs   Lab 01/03/22  0617 01/03/22  1632 01/03/22  2110 01/04/22  0818 01/04/22  1113 01/04/22  2146   POCTGLUCOSE 126* 99 116* 115* 171* 127*     No results for input(s): CPK, CPKMB, MB, TROPONINI in the last 72 hours.      Microbiology:  Microbiology Results (last 7 days)     ** No results found for the last 168 hours. **          Imaging:        Cardiac:      Results for orders placed during the hospital encounter of 12/31/21    Echo    Interpretation Summary  · Challenging study.  · The left ventricle is normal in size with probably normal systolic function.  · The estimated ejection fraction is 50% or higher. The septum appears hypokinetic. Not all segments are visualized.  · Normal left ventricular  diastolic function.  · Normal right ventricular size with normal right ventricular systolic function.  · There is no significant tricuspid regurgitation and therefore the pulmonary artery systolic pressure cannot be reported.  · IVC could not be visualized.  · Small posterolateral pericardial effusion.        Procedures: none  * No surgery found *        Current Discharge Medication List      START taking these medications    Details   levETIRAcetam (KEPPRA) 1000 MG tablet Take 1 tablet (1,000 mg total) by mouth 2 (two) times daily.  Qty: 60 tablet, Refills: 11         CONTINUE these medications which have CHANGED    Details   carvediloL (COREG) 3.125 MG tablet Take 1 tablet (3.125 mg total) by mouth 2 (two) times daily.  Qty: 60 tablet, Refills: 11    Comments: .         CONTINUE these medications which have NOT CHANGED    Details   acetaminophen (TYLENOL) 500 MG tablet Take 1,000 mg by mouth 2 (two) times daily as needed for Pain.      ascorbic acid, vitamin C, (VITAMIN C) 1000 MG tablet Take 1,000 mg by mouth once daily.      atorvastatin (LIPITOR) 20 MG tablet Take 20 mg by mouth nightly.      bisacodyL (DULCOLAX) 5 mg EC tablet Take 5 mg by mouth daily as needed for Constipation.      cholecalciferol, vitamin D3, 125 mcg (5,000 unit) Tab Take 5,000 Units by mouth once a week.      cyanocobalamin 500 MCG tablet Take 500 mcg by mouth once daily.      digoxin (LANOXIN) 125 mcg tablet Take 0.125 mg by mouth once daily.      DULoxetine (CYMBALTA) 60 MG capsule Take 60 mg by mouth 2 (two) times daily.      gabapentin (NEURONTIN) 100 MG capsule Take 3 capsules (300 mg total) by mouth 3 (three) times daily.  Qty: 270 capsule, Refills: 3      levothyroxine (SYNTHROID) 25 MCG tablet Take 25 mcg by mouth once daily.      mupirocin (BACTROBAN) 2 % ointment Apply topically once daily.  Qty: 30 g, Refills: 2      OLANZapine (ZYPREXA) 2.5 MG tablet Take 2.5 mg by mouth once daily.      omega-3 fatty acids 500 mg Cap Take 1  capsule by mouth once daily.      sodium chloride 0.9 % PgBk 100 mL with ertapenem 1 gram SolR 1 g Inject 1 g into the vein.      tamsulosin (FLOMAX) 0.4 mg Cap Take 1 capsule by mouth nightly.               Discharge Diet:cardiac diet and diabetic diet: 2000 calorie with Normal Fluid intake of 1500 - 2000 mL per day    Activity: activity as tolerated    Discharge Condition: Fair    Disposition: Home or Self Care    Follow up:     Follow-up Information     Joss Funk MD In 1 week.    Specialty: Family Medicine  Contact information:  [address]  [address]  [place] [facility] [place] [address]  [phone]             [facility] 7th Fl In 1 week.    Specialty: Neurology  Why: Seizure  Contact information:  [address]  [facility] [address] [phone]  Additional information:  Neuroscience [place] [facility], 7th Floor   Please park in [facility] and take Clinic elevator                       Tests pending at the time of discharge: none        Time spent  on the discharge of the patient including review of hospital course with the patient. reviewing discharge medications and arranging follow-up care: >30 mins    Discharge examination: Patient was seen and examined on the date of discharge and determined to be suitable for discharge.        Christian Zhou M.D.  Department of Hospital Medicine  [facility]

## 2022-01-05 NOTE — PLAN OF CARE
[facility] - Neurosurgery ([facility])      HOME HEALTH ORDERS  FACE TO FACE ENCOUNTER    Patient Name: Raleigh Blair  YOB: 1950    PCP: Joss Funk MD   PCP Address: [address] / [place]  PCP Phone Number: [phone]  PCP Fax: [phone]    Encounter Date: 12/31/21    Admit to Home Health    Diagnoses:  Active Hospital Problems    Diagnosis  POA    *Tonic clonic seizures [G40.409]  Yes    Impaired mobility and activities of daily living [Z74.09, Z78.9]  Unknown    Type 2 diabetes mellitus [E11.9]  Yes    History of CVA with residual deficit [I69.30]  Not Applicable    Acute on chronic intracranial subdural hematoma [I62.01, I62.03]  Yes    S/P AKA (above knee amputation) unilateral, left [Z89.612]  Not Applicable    CHF (congestive heart failure) [I50.9]  Yes    Hypothyroidism [E03.9]  Yes    Essential hypertension [I10]  Yes    Hyperlipidemia [E78.5]  Yes    Elevated lactic acid level [R79.89]  Yes    Peripheral vascular disease [I73.9]  Yes      Resolved Hospital Problems    Diagnosis Date Resolved POA    Right-sided cerebrovascular accident (CVA) [I63.9] 12/31/2021 Yes    CVA (cerebral vascular accident) [I63.9] 12/31/2021 Yes    Non-healing amputation site [T87.89] 12/31/2021 Yes       Follow Up Appointments:  No future appointments.    Allergies:Review of patient's allergies indicates:  No Known Allergies    Medications: Review discharge medications with patient and family and provide education.    Current Facility-Administered Medications   Medication Dose Route Frequency Provider Last Rate Last Admin    atorvastatin tablet 20 mg  20 mg Oral Daily Kellen Cartwright PA-C   20 mg at 01/05/22 0843    bisacodyL EC tablet 5 mg  5 mg Oral Once Christian Zhou MD        carvediloL tablet 3.125 mg  3.125 mg Oral BID Viky Zepeda MD   3.125 mg at 01/05/22 0842    dextrose 50% injection 12.5 g  12.5 g Intravenous PRN Ladan Moyer PA-C         digoxin tablet 0.125 mg  0.125 mg Oral Daily Kellen Cartwright PA-C   0.125 mg at 01/05/22 0842    DULoxetine DR capsule 60 mg  60 mg Oral BID Kellen Cartwright PA-C   60 mg at 01/05/22 0842    gabapentin 250 mg/5 mL (5 mL) solution 300 mg  300 mg Oral Q8H Kellen Cartwright PA-C   300 mg at 01/05/22 0840    glucagon (human recombinant) injection 1 mg  1 mg Intramuscular PRN Ladan Moyer PA-C        heparin (porcine) injection 5,000 Units  5,000 Units Subcutaneous Q8H Ladan Moyer PA-C   5,000 Units at 01/05/22 0546    hydrALAZINE injection 10 mg  10 mg Intravenous Q6H PRN Ladan Moyer PA-C   10 mg at 01/01/22 0242    insulin aspart U-100 pen 1-10 Units  1-10 Units Subcutaneous QID (AC + HS) PRN Nathen Camargo MD        labetalol 20 mg/4 mL (5 mg/mL) IV syring  10 mg Intravenous Q6H PRN Ladan Moyer PA-C        levETIRAcetam tablet 1,000 mg  1,000 mg Oral BID Ladan Moyer PA-C   1,000 mg at 01/05/22 0842    levothyroxine tablet 50 mcg  50 mcg Oral Before breakfast Christian Zhou MD   50 mcg at 01/05/22 0546    mupirocin 2 % ointment   Nasal BID Lupe Starr MD   Given at 01/05/22 0844    OLANZapine tablet 2.5 mg  2.5 mg Oral Daily Kellen Cartwright PA-C   2.5 mg at 01/05/22 0842    polyethylene glycol packet 17 g  17 g Oral BID Christian Zhou MD   17 g at 01/05/22 0843    senna-docusate 8.6-50 mg per tablet 2 tablet  2 tablet Oral BID Christian Zhou MD   2 tablet at 01/05/22 0841    silodosin capsule 4 mg  4 mg Oral Daily Kellen Cartwright PA-C   4 mg at 01/05/22 0842    sodium chloride 0.9% flush 10 mL  10 mL Intravenous PRN Ladan Moyer PA-C         Current Discharge Medication List      START taking these medications    Details   levETIRAcetam (KEPPRA) 1000 MG tablet Take 1 tablet (1,000 mg total) by mouth 2 (two) times daily.  Qty: 60 tablet, Refills: 11         CONTINUE these medications which have CHANGED    Details   carvediloL (COREG) 3.125 MG tablet Take 1  tablet (3.125 mg total) by mouth 2 (two) times daily.  Qty: 60 tablet, Refills: 11    Comments: .         CONTINUE these medications which have NOT CHANGED    Details   acetaminophen (TYLENOL) 500 MG tablet Take 1,000 mg by mouth 2 (two) times daily as needed for Pain.      ascorbic acid, vitamin C, (VITAMIN C) 1000 MG tablet Take 1,000 mg by mouth once daily.      atorvastatin (LIPITOR) 20 MG tablet Take 20 mg by mouth nightly.      bisacodyL (DULCOLAX) 5 mg EC tablet Take 5 mg by mouth daily as needed for Constipation.      cholecalciferol, vitamin D3, 125 mcg (5,000 unit) Tab Take 5,000 Units by mouth once a week.      cyanocobalamin 500 MCG tablet Take 500 mcg by mouth once daily.      digoxin (LANOXIN) 125 mcg tablet Take 0.125 mg by mouth once daily.      DULoxetine (CYMBALTA) 60 MG capsule Take 60 mg by mouth 2 (two) times daily.      gabapentin (NEURONTIN) 100 MG capsule Take 3 capsules (300 mg total) by mouth 3 (three) times daily.  Qty: 270 capsule, Refills: 3      levothyroxine (SYNTHROID) 25 MCG tablet Take 25 mcg by mouth once daily.      mupirocin (BACTROBAN) 2 % ointment Apply topically once daily.  Qty: 30 g, Refills: 2      OLANZapine (ZYPREXA) 2.5 MG tablet Take 2.5 mg by mouth once daily.      omega-3 fatty acids 500 mg Cap Take 1 capsule by mouth once daily.      sodium chloride 0.9 % PgBk 100 mL with ertapenem 1 gram SolR 1 g Inject 1 g into the vein.      tamsulosin (FLOMAX) 0.4 mg Cap Take 1 capsule by mouth nightly.               I have seen and examined this patient within the last 30 days. My clinical findings that support the need for the home health skilled services and home bound status are the following:no   Weakness/numbness causing balance and gait disturbance due to Stroke and Weakness/Debility making it taxing to leave home.     Diet:   cardiac diet    Labs:  Report Lab results to PCP.    Referrals/ Consults  Physical Therapy to evaluate and treat. Evaluate for home safety and  equipment needs; Establish/upgrade home exercise program. Perform / instruct on therapeutic exercises, gait training, transfer training, and Range of Motion.  Occupational Therapy to evaluate and treat. Evaluate home environment for safety and equipment needs. Perform/Instruct on transfers, ADL training, ROM, and therapeutic exercises.  Speech Therapy  to evaluate and treat for  Language, Swallowing and Cognition.  Aide to provide assistance with personal care, ADLs, and vital signs.    Activities:   activity as tolerated    Nursing:   Agency to admit patient within 24 hours of hospital discharge unless specified on physician order or at patient request    SN to complete comprehensive assessment including routine vital signs. Instruct on disease process and s/s of complications to report to MD. Review/verify medication list sent home with the patient at time of discharge  and instruct patient/caregiver as needed. Frequency may be adjusted depending on start of care date.     Skilled nurse to perform up to 3 visits PRN for symptoms related to diagnosis    Notify MD if SBP > 160 or < 90; DBP > 90 or < 50; HR > 120 or < 50; Temp > 101; O2 < 88%; Other:       Ok to schedule additional visits based on staff availability and patient request on consecutive days within the home health episode.    When multiple disciplines ordered:    Start of Care occurs on Sunday - Wednesday schedule remaining discipline evaluations as ordered on separate consecutive days following the start of care.    Thursday SOC -schedule subsequent evaluations Friday and Monday the following week.     Friday - Saturday SOC - schedule subsequent discipline evaluations on consecutive days starting Monday of the following week.    For all post-discharge communication and subsequent orders please contact patient's primary care physician. If unable to reach primary care physician or do not receive response within 30 minutes, please contact [facility] for  clinical staff order clarification    Miscellaneous   Heart Failure:      SN to instruct on the following:    Instruct on the definition of CHF.   Instruct on the signs/sympoms of CHF to be reported.   Instruct on and monitor daily weights.   Instruct on factors that cause exacerbation.   Instruct on action, dose, schedule, and side effects of medications.   Instruct on diet as prescribed.   Instruct on activity allowed.   Instruct on life-style modifications for life long management of CHF   SN to assess compliance with daily weights, diet, medications, fluid retention,    safety precautions, activities permitted and life-style modifications.   Additional 1-2 SN visits per week as needed for signs and symptoms     of CHF exacerbation.      For Weight Gain > 2-3 lbs in 1 day or 4-6 lbs over 1 week notify PCP:  Obtain BMP lab test in 3 days    Home Health Aide:  Nursing Weekly, Physical Therapy Twice weekly, Occupational Therapy Twice weekly, Speech Language Pathology Three times weekly and Home Health Aide Three times weekly    Wound Care Orders  n/a    I certify that this patient is confined to his home and needs intermittent skilled nursing care, physical therapy, speech therapy and occupational therapy.

## 2022-01-05 NOTE — PLAN OF CARE
[facility] (H[facility])  Discharge Final Note    Primary Care Provider: Joss Funk MD  Expected Discharge Date: 1/5/2022  Patient medically ready for discharge to home today.  Transportation to home will be provided by pt's wife.   Is family/patient aware of discharge yes, SW notified pt's wife by phone.  Hospital follow up scheduled, pt's wife reported that she would like to schedule PCP follow-up for pt herself so that she can coordinate appointment with pt's wound care schedule.  MARITZA sent HH orders to pt's established HH agency, [facility].  Arely with [facility] reported that they can accept pt back for services.    Final Discharge Note (most recent)     Final Note - 01/05/22 1311        Final Note    Assessment Type Final Discharge Note     Anticipated Discharge Disposition Home-Health Care [facility]     What phone number can be called within the next 1-3 days to see how you are doing after discharge? [phone]     Hospital Resources/Appts/Education Provided Provided patient/caregiver with written discharge plan information;Provided education on problems/symptoms using teachback        Post-Acute Status    Post-Acute Authorization Home Health     Post-Acute Placement Status Set-up Complete/Auth obtained     Home Health Status Set-up Complete/Auth obtained     Discharge Delays None known at this time               Important Message from Medicare  Important Message from Medicare regarding Discharge Appeal Rights: Explained to patient/caregiver,Signed/date by patient/caregiver,Other (comments) (phone/verbal/wife  (Mrs. Blair)   witness Khadijah)   Date IMM was signed: 01/05/22  Time IMM was signed: 0811  Referral Info (most recent)     Referral Info    No documentation.                 No future appointments.  LEXUS Hicks  Case Management  Ext: [phone]  01/05/2022

## 2022-01-05 NOTE — PHYSICIAN QUERY
PT Name: Raleigh Blair  MR #: [id number]     Documentation Clarification      CDS/: Indira Mccloud RN, CDS               Contact information: [email or web address]    This form is a permanent document in the medical record.     Query Date: January 5, 2022    By submitting this query, we are merely seeking further clarification of documentation. Please utilize your independent clinical judgment when addressing the question(s) below.    The Medical Record reflects the following:    Supporting Clinical Findings Location in Medical Record     --PMHx s/p L AKA w/ chronically non-healing wounds   --Per chart review, the patient's wife states he is currently receiving antibiotics via a PICC for infected left AKA  --Extremities: L AKA with wound present  --S/P AKA (above knee amputation) unilateral, left    -History of L AKA with poor wound healing  -wound care consult      -currently afebrile with WBC WNL, bcx from [facility] pending    --Wound consult received on patient's chronic Left AKA incision, slow healing  --Left AKA incision: Wound moist red, scant serous drainage, no odor, periwound intact          -Recommend MWF: clean with sterile normal saline, cavilon skin barrier to periwound, pack with aquacel ag packing strips, cover with mepilex foam dressing  Wound Image    Drainage Amount Scant   Appearance Moist;Red   Periwound Area Scar tissue   Wound Edges Open   Wound Length (cm) 0.5 cm   Wound Width (cm) 4 cm   Wound Depth (cm) 1 cm   Wound Volume (cm^3) 2 cm^3   Wound Surface Area (cm^2) 2 cm^2   Packing packed with  (aquacel ag packing strip)   Periwound Care Skin barrier film applied     --S/P AKA (above knee amputation) unilateral, left          --Finished course of abx per wife, will dc picc at discharge     [facility] H&P 12/31                  Wound care Note 1/3                                                                                  Hosp med note 1/4                                                                               Provider, please further specify the  L AKA wound/infected left AKA  During current admission to [facility]:    [ x  ] L AKA without infection; Delayed healing only     [   ] L AKA without infection; Incision dehishence only     [   ] L AKA with current wound infection (please specify if known):________     [   ] L AKA Incision dehishence with current infection (please specify further if known): ___________     [   ] Other (please specify): ____________     [  ] Clinically undetermined

## 2022-01-06 ENCOUNTER — PATIENT OUTREACH (OUTPATIENT)
Dept: ADMINISTRATIVE | Facility: CLINIC | Age: 72
End: 2022-01-06
Payer: MEDICARE

## 2022-01-06 NOTE — PATIENT INSTRUCTIONS
Patient Education       Seizures Discharge Instructions, Adult   About this topic   A seizure happens when your brain sends out abnormal signals through its electrical activity. It may cause shaking and fast movements that you cannot control. You will not be aware of this. A seizure most often lasts for a few seconds to minutes and then causes you to be very tired. You may seem to be asleep or confused during this time. In a little while, you should return to your normal self.       What care is needed at home?   · Ask your doctor what you need to do when you go home. Make sure you ask questions if you do not understand what the doctor says. This way you will know what you need to do.  · Be sure to take all your seizure drugs. Do not skip doses. Do not suddenly stop taking these drugs.  · Wear a medical alert ID.  · Keep your home safe so you do not get hurt when your seizure happens. For example:  ? Keep doors inside your home unlocked or unblocked.  ? Carpet all floors to keep them soft in case you have a seizure and fall.  ? Be careful when taking a bath. If you have a seizure while in the tub, you might drown. Showers are safer.  ? Replace glass doors with safety glass.  ? Replace glass dishes and glassware with plastic.  · Wear a helmet or headgear to protect your head during riding, skiing, and other active sports.  · Stay away from doing things that can put you or someone else in danger if you have a seizure.  Tell your family, friends, and others about your health problem. If a seizure happens, tell your family and friends to:  · Clear the area so you are not hurt  · Place you on a flat surface  · Not try to hold you still  · Not put anything in your mouth  · Turn you onto your side if you are throwing up  · Stay with you until you are feeling better and know what happened  · Time the seizure  What follow-up care is needed?   Your doctor may ask you to make visits to the office to check on your progress. Be  sure to keep these visits. Your doctor may order tests, such as scans or tests of your brain's electrical activity. Be sure to keep these visits and follow up to get test results. You may have a blood test to see if the level of the seizure drug in your blood is good.  What drugs may be needed?   The doctor will give you a drug or drugs for your seizures. There are many kinds of these drugs. The choice will depend on what type of seizure you are having and how well the drug works for you.  Will physical activity be limited?   · You should be able to work out, dance, and play sports such as tennis, golf, bowling, and others.  · Always have someone with you when swimming.  · Follow laws in your area about driving with a seizure problem.  · Talk to your doctor about the right activities for you.  · Ask your doctor if it is safe to drive.  What changes to diet are needed?   · Do not drink beer, wine, and mixed drinks (alcohol).   · Do not take drugs and natural products that slow your actions.  What problems could happen?   · You could fall and get hurt while having a seizure.  · The drugs you are taking can have side effects. They can make you feel tired and even depressed.  What can be done to prevent this health problem?   · Get enough sleep. Not getting the right amount of sleep can make you more likely to have a seizure.  · Eat regular, well-balanced meals.  · Do not skip doses of your seizure drug. This could cause you to have a seizure.  · Lower your stress.  When do I need to call the doctor?   · Seizures happen more often  · Seizures are worse  · Seizure lasts more than 5 minutes  · Signs of low mood (depression), thoughts of killing yourself, nervousness, emotional ups and downs, thinking that is not normal, anxiety, or lack of interest in life  · Changes in the way you act that are not like you  · Feeling weak  · Change in balance  · Side effects from your drugs  · You are not feeling better in 2 to 3 days or  you are feeling worse  Helpful tips   · Do not miss doses of your seizure drugs. If you forget a dose, take it as soon as possible. If it is within a few hours of your next dose, skip it and take your next dose.  · Do not run out of your seizure drugs. Get refills before you run out.  Teach Back: Helping You Understand   The Teach Back Method helps you understand the information we are giving you. . After you talk with the staff, tell them in your own words what you learned. This helps to make sure the staff has covered each thing clearly. It also helps to explain things that may have been confusing. Before going home, make sure you can do these:  · I can tell you about my condition.  · I can tell you what to do if I have a seizure.  · I can tell you what I need to do if my seizures happen more often or last more than 5 minutes.  Where can I learn more?   American Academy of Family Physicians  https://familydoctor.org/condition/epilepsy/   Better Health Channel  https://www.betterhealth.maureen.gov.au/health/ConditionsAndTreatments/epilepsy   National Oral of Neurological Disorders and Stroke  http://www.ninds.nih.gov/disorders/epilepsy/detail_epilepsy.htm   Last Reviewed Date   2020-04-09  Consumer Information Use and Disclaimer   This information is not specific medical advice and does not replace information you receive from your health care provider. This is only a brief summary of general information. It does NOT include all information about conditions, illnesses, injuries, tests, procedures, treatments, therapies, discharge instructions or life-style choices that may apply to you. You must talk with your health care provider for complete information about your health and treatment options. This information should not be used to decide whether or not to accept your health care providers advice, instructions or recommendations. Only your health care provider has the knowledge and training to provide advice  that is right for you.  Copyright   Copyright © 2021 UpToDate, Inc. and its affiliates and/or licensors. All rights reserved.    Shirlene teaching reviewed with Raleigh Blair wife . She verbalized understanding.    Education was provided based on the patient's discharge diagnosis using the attached Shirlene patient education as a reference.

## 2022-01-06 NOTE — PROGRESS NOTES
C3 nurse spoke with Raleigh Blair for a TCC post hospital discharge follow up call. The patient reports does not have a scheduled HOSFU appointment. C3 nurse was unable to schedule [facility]. Patient advised to contact their PCP to schedule a HOSPFU within 7 days.

## 2022-01-06 NOTE — PT/OT/SLP DISCHARGE
Occupational Therapy Discharge Summary    Raleigh Blair  MRN: [id number]   Principal Problem: Tonic clonic seizures      Patient Discharged from acute Occupational Therapy on 1/5/22.  Please refer to prior OT note dated 1/4/22 for functional status.    Assessment:      Patient appropriate for care in another setting.    Objective:     GOALS:   Multidisciplinary Problems     Occupational Therapy Goals        Problem: Occupational Therapy Goal    Goal Priority Disciplines Outcome Interventions   Occupational Therapy Goal     OT, PT/OT Ongoing, Progressing    Description: Goals to be met by: 7 days (1/9/22)     Patient will increase functional independence with ADLs by performing:    UE Dressing with Minimal Assistance.  LE Dressing with Moderate Assistance.  Grooming while seated with Minimal Assistance.  Toileting from bedside commode with Minimal Assistance for hygiene and clothing management.   Sitting at edge of bed x10 minutes with Contact Guard Assistance.  Supine to sit with Minimal Assistance.  Stand pivot transfer to wheelchair with Minimal Assistance.                     Reasons for Discontinuation of Therapy Services  Transfer to alternate level of care.      Plan:     Patient Discharged to: Home with Home Health Service    1/6/2022

## 2022-01-21 ENCOUNTER — HOSPITAL ENCOUNTER (OUTPATIENT)
Dept: WOUND CARE | Facility: HOSPITAL | Age: 72
Discharge: HOME | End: 2022-01-21
Attending: PREVENTIVE MEDICINE
Payer: MEDICARE

## 2022-01-21 VITALS
HEIGHT: 63 IN | WEIGHT: 166 LBS | HEART RATE: 63 BPM | BODY MASS INDEX: 29.41 KG/M2 | DIASTOLIC BLOOD PRESSURE: 57 MMHG | SYSTOLIC BLOOD PRESSURE: 125 MMHG | TEMPERATURE: 98 F

## 2022-01-21 DIAGNOSIS — L89.223 PRESSURE INJURY OF LEFT THIGH, STAGE 3: ICD-10-CM

## 2022-01-21 DIAGNOSIS — S31.109S OPEN WOUND OF ABDOMINAL WALL, SEQUELA: ICD-10-CM

## 2022-01-21 DIAGNOSIS — Z89.612 S/P AKA (ABOVE KNEE AMPUTATION) UNILATERAL, LEFT: Primary | ICD-10-CM

## 2022-01-21 DIAGNOSIS — L92.9 HYPERGRANULATION: ICD-10-CM

## 2022-01-21 PROCEDURE — 87186 SC STD MICRODIL/AGAR DIL: CPT | Performed by: PREVENTIVE MEDICINE

## 2022-01-21 PROCEDURE — 87070 CULTURE OTHR SPECIMN AEROBIC: CPT | Performed by: PREVENTIVE MEDICINE

## 2022-01-21 PROCEDURE — 99214 OFFICE O/P EST MOD 30 MIN: CPT

## 2022-01-21 PROCEDURE — 87077 CULTURE AEROBIC IDENTIFY: CPT | Performed by: PREVENTIVE MEDICINE

## 2022-01-21 PROCEDURE — 87075 CULTR BACTERIA EXCEPT BLOOD: CPT | Performed by: PREVENTIVE MEDICINE

## 2022-01-21 NOTE — PROGRESS NOTES
Subjective:       Patient ID: Raleigh Blair is a 71 y.o. male.    Chief Complaint: Wound Care    Follow up appt. Wound to left stump declined and 2 other wounds stable. Dr Conroy assessed patient,culture taken of left stump wound and sent to lab. Dr Conroy applied silver nitrate to wounds. Plan of care instructed to patient and spouse who voiced understanding. Orders sent to home health for dressing changes. F/U in 3 weeks.    Review of Systems   All other systems reviewed and are negative.        Objective:      Temp:  [97.9 °F (36.6 °C)]   Pulse:  [63]   BP: (125)/(57)   Physical Exam       Wound 09/10/21 1300 Ulceration Left Abdomen (Active)   09/10/21 1300    Pre-existing:    Primary Wound Type: Ulceration   Side: Left   Orientation:    Location: Abdomen   Wound Number:    Ankle-Brachial Index:    Pulses:    Removal Indication and Assessment:    Wound Outcome:    (Retired) Wound Type:    (Retired) Wound Length (cm):    (Retired) Wound Width (cm):    (Retired) Depth (cm):    Wound Description (Comments):    Removal Indications:    Wound Image   01/21/22 1200   Dressing Appearance Intact;Dry 01/21/22 1200   Drainage Amount None 01/21/22 1200   Appearance Pink 01/21/22 1200   Tissue loss description Partial thickness 01/21/22 1200   Red (%), Wound Tissue Color 100 % 01/21/22 1200   Periwound Area Intact;Dry 01/21/22 1200   Wound Edges Defined 01/21/22 1200   Wound Length (cm) 0.2 cm 01/21/22 1200   Wound Width (cm) 0.1 cm 01/21/22 1200   Wound Surface Area (cm^2) 0.02 cm^2 01/21/22 1200   Care Cleansed with:;Sterile normal saline 01/21/22 1200   Dressing Applied;Silver;Island/border 01/21/22 1200   Dressing Change Due 01/28/22 01/21/22 1200            Incision/Site 09/10/21 1300 Left Other (see comments) (Active)   09/10/21 1300   Present Prior to Hospital Arrival?:    Side: Left   Location: Other (see comments)   Orientation:    Incision Type:    Closure Method:    Additional Comments:    Removal Indication  and Assessment:    Wound Outcome: Amputation   Removal Indications:    Wound Image   01/21/22 1200   Dressing Appearance Intact 01/21/22 1200   Drainage Amount Moderate 01/21/22 1200   Drainage Characteristics/Odor Purulent;Malodorous 01/21/22 1200   Appearance Red 01/21/22 1200   Red (%), Wound Tissue Color 100 % 01/21/22 1200   Periwound Area Redness 01/21/22 1200   Wound Edges Open 01/21/22 1200   Wound Length (cm) 0.6 cm 01/21/22 1200   Wound Width (cm) 1 cm 01/21/22 1200   Wound Depth (cm) 1 cm 01/21/22 1200   Wound Volume (cm^3) 0.6 cm^3 01/21/22 1200   Wound Surface Area (cm^2) 0.6 cm^2 01/21/22 1200   Care Cleansed with:;Sterile normal saline 01/21/22 1200   Dressing Applied;Calcium alginate;Island/border;Other (comment) 01/21/22 1200   Packing Inserted  1 01/21/22 1200   Packing Removed 1 01/21/22 1200   Periwound Care Absorptive dressing applied 01/21/22 1200   Dressing Change Due 01/28/22 01/21/22 1200            Incision/Site 09/10/21 1300 Left Thigh posterior (Active)   09/10/21 1300   Present Prior to Hospital Arrival?:    Side: Left   Location: Thigh   Orientation: posterior   Incision Type:    Closure Method:    Additional Comments: abscess   Removal Indication and Assessment:    Wound Outcome:    Removal Indications:    Wound Image   01/21/22 1200   Dressing Appearance Intact;Moist drainage 01/21/22 1200   Drainage Amount Moderate 01/21/22 1200   Drainage Characteristics/Odor Serosanguineous 01/21/22 1200   Appearance Red;Moist 01/21/22 1200   Red (%), Wound Tissue Color 100 % 01/21/22 1200   Periwound Area Intact 01/21/22 1200   Wound Edges Open 01/21/22 1200   Wound Length (cm) 0.5 cm 01/21/22 1200   Wound Width (cm) 0.4 cm 01/21/22 1200   Wound Depth (cm) 2 cm 01/21/22 1200   Wound Volume (cm^3) 0.4 cm^3 01/21/22 1200   Wound Surface Area (cm^2) 0.2 cm^2 01/21/22 1200   Tunneling (depth (cm)/location) 3.0cm 01/21/22 1200   Care Cleansed with:;Sterile normal saline 01/21/22 1200   Dressing  Applied;Calcium alginate;Island/border;Other (comment) 01/21/22 1200   Packing strip gauze 01/21/22 1200   Packing Inserted  1 01/21/22 1200   Periwound Care Absorptive dressing applied 01/21/22 1200   Dressing Change Due 01/28/22 01/21/22 1200         Assessment:         ICD-10-CM ICD-9-CM   1. S/P AKA (above knee amputation) unilateral, left  Z89.612 V49.76   2. Pressure injury of left thigh, stage 3  L89.223 707.09     707.23   3. Open wound of abdominal wall, sequela  S31.109S 906.0         Plan:   Tissue pathology and/or culture taken:  [x] Yes [] No   Sharp debridement performed:   [] Yes [x] No   Labs ordered this visit:   [] Yes [x] No   Imaging ordered this visit:   [] Yes [x] No           Orders Placed This Encounter   Procedures    Aerobic culture     Left leg stump wound     Scheduling Instructions:      Left leg stump wound    Anaerobic culture          Change dressing     Left Amputation Incisional Site   Cleanse wound with: Normal Saline   Lidocaine: PRN   Silver nitrate: PRN   Periwound care: Cavilon   Primary dressing: Antimicrobial packing packed lightly to wound   Secondary dressing: Border foam     Left posterior thigh   Cleanse wound with: Normal Saline   Lidocaine: PRN   Silver nitrate: PRN   Periwound care: Cavilon   Primary dressing: Antimicrobial packing (Silver alginate) packed lightly to wound   Secondary dressing: Border foam     Left Abdominal wound   Cleanse wound with: Normal Saline   Lidocaine: PRN   Silver nitrate: PRN   Periwound care: Cavilon   Primary dressing: Aquacel Extra AG (Silver alginate)   Secondary dressing: Border foam     Frequency: Monday, Wed, Fri and Prn     Follow-up: Monday 01/11/22 with Dr. Conroy     [facility]: [facility]: Fax #[phone] Change dressings Monday, Wed, Fridays and   Prn, except when in clinic.      3 week follow up

## 2022-01-24 LAB — BACTERIA SPEC AEROBE CULT: ABNORMAL

## 2022-01-25 LAB — BACTERIA SPEC ANAEROBE CULT: NORMAL

## 2022-01-28 RX ORDER — AMOXICILLIN AND CLAVULANATE POTASSIUM 875; 125 MG/1; MG/1
1 TABLET, FILM COATED ORAL EVERY 12 HOURS
Qty: 20 TABLET | Refills: 0 | Status: SHIPPED | OUTPATIENT
Start: 2022-01-28 | End: 2022-02-07

## 2022-02-04 ENCOUNTER — HOSPITAL ENCOUNTER (OUTPATIENT)
Dept: WOUND CARE | Facility: HOSPITAL | Age: 72
Discharge: HOME | End: 2022-02-04
Attending: PREVENTIVE MEDICINE
Payer: MEDICARE

## 2022-02-04 DIAGNOSIS — L89.223 PRESSURE INJURY OF LEFT THIGH, STAGE 3: ICD-10-CM

## 2022-02-04 DIAGNOSIS — Z89.612 S/P AKA (ABOVE KNEE AMPUTATION) UNILATERAL, LEFT: Primary | ICD-10-CM

## 2022-02-04 DIAGNOSIS — S31.109S OPEN WOUND OF ABDOMINAL WALL, SEQUELA: ICD-10-CM

## 2022-02-04 PROCEDURE — 99213 OFFICE O/P EST LOW 20 MIN: CPT

## 2022-02-04 NOTE — PROGRESS NOTES
Subjective:       Patient ID: Raleigh Blair is a 71 y.o. male.    Chief Complaint: Wound Care    Patient to wound care center for LLE wounds, progressing well. Continue with the same treatment plan.     Review of Systems   All other systems reviewed and are negative.        Objective:         Physical Exam       Wound 09/10/21 1300 Ulceration Left Abdomen (Active)   09/10/21 1300    Pre-existing:    Primary Wound Type: Ulceration   Side: Left   Orientation:    Location: Abdomen   Wound Number:    Ankle-Brachial Index:    Pulses:    Removal Indication and Assessment:    Wound Outcome:    (Retired) Wound Type:    (Retired) Wound Length (cm):    (Retired) Wound Width (cm):    (Retired) Depth (cm):    Wound Description (Comments):    Removal Indications:    Wound Image   02/04/22 1000   Dressing Appearance Intact;Dry 02/04/22 1000   Drainage Amount None 02/04/22 1000   Appearance Pink;Intact 02/04/22 1000   Tissue loss description Partial thickness 02/04/22 1000   Red (%), Wound Tissue Color 100 % 02/04/22 1000   Periwound Area Intact;Dry 02/04/22 1000   Wound Length (cm) 0.1 cm 02/04/22 1000   Wound Width (cm) 0.1 cm 02/04/22 1000   Wound Depth (cm) 0.1 cm 02/04/22 1000   Wound Volume (cm^3) 0.001 cm^3 02/04/22 1000   Wound Surface Area (cm^2) 0.01 cm^2 02/04/22 1000   Care Cleansed with:;Soap and water;Sterile normal saline 02/04/22 1000   Dressing Applied;Changed;Island/border 02/04/22 1000   Dressing Change Due 02/18/22 02/04/22 1000            Incision/Site 09/10/21 1300 Left Other (see comments) (Active)   09/10/21 1300   Present Prior to Hospital Arrival?:    Side: Left   Location: Other (see comments)   Orientation:    Incision Type:    Closure Method:    Additional Comments:    Removal Indication and Assessment:    Wound Outcome: Amputation   Removal Indications:    Wound Image   02/04/22 1000   Dressing Appearance Intact;Moist drainage 02/04/22 1000   Drainage Amount Moderate 02/04/22 1000   Drainage  Characteristics/Odor Serosanguineous 02/04/22 1000   Appearance Intact;Pink;Red 02/04/22 1000   Red (%), Wound Tissue Color 100 % 02/04/22 1000   Periwound Area Intact;Redness 02/04/22 1000   Wound Edges Open 02/04/22 1000   Wound Length (cm) 0.5 cm 02/04/22 1000   Wound Width (cm) 1 cm 02/04/22 1000   Wound Depth (cm) 0.8 cm 02/04/22 1000   Wound Volume (cm^3) 0.4 cm^3 02/04/22 1000   Wound Surface Area (cm^2) 0.5 cm^2 02/04/22 1000   Care Cleansed with:;Soap and water;Sterile normal saline 02/04/22 1000   Dressing Applied;Changed;Calcium alginate;Island/border 02/04/22 1000   Packing packed with;strip gauze 02/04/22 1000   Packing Inserted  1 02/04/22 1000   Packing Removed 1 02/04/22 1000   Periwound Care Absorptive dressing applied 02/04/22 1000   Dressing Change Due 02/18/22 02/04/22 1000            Incision/Site 09/10/21 1300 Left Thigh posterior (Active)   09/10/21 1300   Present Prior to Hospital Arrival?:    Side: Left   Location: Thigh   Orientation: posterior   Incision Type:    Closure Method:    Additional Comments: abscess   Removal Indication and Assessment:    Wound Outcome:    Removal Indications:    Wound Image   02/04/22 1000   Dressing Appearance Intact;Moist drainage 02/04/22 1000   Drainage Amount Moderate 02/04/22 1000   Drainage Characteristics/Odor Serosanguineous 02/04/22 1000   Appearance Intact;Red;Moist 02/04/22 1000   Red (%), Wound Tissue Color 100 % 02/04/22 1000   Periwound Area Intact 02/04/22 1000   Wound Edges Open 02/04/22 1000   Wound Length (cm) 0.4 cm 02/04/22 1000   Wound Width (cm) 0.4 cm 02/04/22 1000   Wound Depth (cm) 1.5 cm 02/04/22 1000   Wound Volume (cm^3) 0.24 cm^3 02/04/22 1000   Wound Surface Area (cm^2) 0.16 cm^2 02/04/22 1000   Care Cleansed with:;Soap and water;Sterile normal saline 02/04/22 1000   Dressing Applied;Changed;Calcium alginate;Island/border 02/04/22 1000   Packing strip gauze 02/04/22 1000   Packing Inserted  1 02/04/22 1000   Packing Removed 1  02/04/22 1000   Periwound Care Absorptive dressing applied 02/04/22 1000   Dressing Change Due 02/18/22 02/04/22 1000         Assessment:         ICD-10-CM ICD-9-CM   1. S/P AKA (above knee amputation) unilateral, left  Z89.612 V49.76   2. Pressure injury of left thigh, stage 3  L89.223 707.09     707.23   3. Open wound of abdominal wall, sequela  S31.109S 906.0       Abdominal wound is resolved.  Posterior thigh and distal AKA wound improving.      Plan:   Tissue pathology and/or culture taken:  [] Yes [x] No   Sharp debridement performed:   [] Yes [x] No   Labs ordered this visit:   [] Yes [x] No   Imaging ordered this visit:   [] Yes [x] No           Orders Placed This Encounter   Procedures    Change dressing     Left Amputation Incisional Site   Cleanse wound with: Normal Saline   Lidocaine: PRN   Silver nitrate: PRN   Periwound care: Cavilon   Primary dressing: Antimicrobial packing packed lightly to wound   Secondary dressing: Border foam     Left posterior thigh   Cleanse wound with: Normal Saline   Lidocaine: PRN   Silver nitrate: PRN   Periwound care: Cavilon   Primary dressing: Antimicrobial packing (Silver alginate) packed lightly to wound   Secondary dressing: Border foam     Left Abdominal wound   Cleanse wound with: Normal Saline   Lidocaine: PRN   Silver nitrate: PRN   Periwound care: Cavilon   Primary dressing:  Border foam      Frequency: Monday, Wed, Fri and Prn     Follow-up: Monday 02/18/22 with Dr. Conroy     [facility]: [facility] [employer]: Fax #[phone] Change dressings Monday, Wed, Fridays and   Prn, except when in clinic.        Follow up in about 2 weeks (around 2/18/2022).

## 2022-02-10 ENCOUNTER — DOCUMENT SCAN (OUTPATIENT)
Dept: HOME HEALTH SERVICES | Facility: HOSPITAL | Age: 72
End: 2022-02-10
Payer: MEDICARE

## 2022-02-11 ENCOUNTER — TELEPHONE (OUTPATIENT)
Dept: NEUROLOGY | Facility: CLINIC | Age: 72
End: 2022-02-11
Payer: MEDICARE

## 2022-02-18 ENCOUNTER — HOSPITAL ENCOUNTER (OUTPATIENT)
Dept: WOUND CARE | Facility: HOSPITAL | Age: 72
Discharge: HOME | End: 2022-02-18
Attending: PREVENTIVE MEDICINE
Payer: MEDICARE

## 2022-02-18 VITALS
TEMPERATURE: 98 F | HEART RATE: 82 BPM | BODY MASS INDEX: 29.41 KG/M2 | DIASTOLIC BLOOD PRESSURE: 64 MMHG | HEIGHT: 63 IN | WEIGHT: 166 LBS | SYSTOLIC BLOOD PRESSURE: 140 MMHG

## 2022-02-18 DIAGNOSIS — S31.109S OPEN WOUND OF ABDOMINAL WALL, SEQUELA: ICD-10-CM

## 2022-02-18 DIAGNOSIS — L89.223 PRESSURE INJURY OF LEFT THIGH, STAGE 3: Primary | ICD-10-CM

## 2022-02-18 PROCEDURE — 99214 OFFICE O/P EST MOD 30 MIN: CPT

## 2022-02-18 NOTE — PROGRESS NOTES
Subjective:       Patient ID: Raleigh Blair is a 71 y.o. male.    Chief Complaint: Non-healing Wound Follow Up    Follow up for nonhealing wounds. No acute complaints from patient today. Patient accompanied by his wife and daughter. Continue current plan of care. Updated orders sent to [facility].    Review of Systems   All other systems reviewed and are negative.        Objective:      Temp:  [98.1 °F (36.7 °C)]   Pulse:  [82]   BP: (140)/(64)   Physical Exam       Wound 09/10/21 1300 Ulceration Left Abdomen (Active)   09/10/21 1300    Pre-existing:    Primary Wound Type: Ulceration   Side: Left   Orientation:    Location: Abdomen   Wound Number:    Ankle-Brachial Index:    Pulses:    Removal Indication and Assessment:    Wound Outcome:    (Retired) Wound Type:    (Retired) Wound Length (cm):    (Retired) Wound Width (cm):    (Retired) Depth (cm):    Wound Description (Comments):    Removal Indications:    Wound Image   02/18/22 1000   Dressing Appearance Intact 02/18/22 1000   Drainage Amount Small 02/18/22 1000   Drainage Characteristics/Odor Serosanguineous 02/18/22 1000   Appearance Pink;Red 02/18/22 1000   Tissue loss description Partial thickness 02/18/22 1000   Red (%), Wound Tissue Color 100 % 02/18/22 1000   Periwound Area Intact 02/18/22 1000   Wound Edges Defined 02/18/22 1000   Wound Length (cm) 0.4 cm 02/18/22 1000   Wound Width (cm) 0.5 cm 02/18/22 1000   Wound Depth (cm) 0.1 cm 02/18/22 1000   Wound Volume (cm^3) 0.02 cm^3 02/18/22 1000   Wound Surface Area (cm^2) 0.2 cm^2 02/18/22 1000   Care Cleansed with:;Sterile normal saline 02/18/22 1000   Dressing Applied;Island/border 02/18/22 1000   Periwound Care Skin barrier film applied 02/18/22 1000   Dressing Change Due 02/21/22 02/18/22 1000            Incision/Site 09/10/21 1300 Left Other (see comments) (Active)   09/10/21 1300   Present Prior to Hospital Arrival?:    Side: Left   Location: Other (see comments)   Orientation:    Incision  Type:    Closure Method:    Additional Comments:    Removal Indication and Assessment:    Wound Outcome: Amputation   Removal Indications:    Wound Image   02/18/22 1000   Dressing Appearance Intact;Moist drainage 02/18/22 1000   Drainage Amount Small 02/18/22 1000   Drainage Characteristics/Odor Serosanguineous 02/18/22 1000   Appearance Red;Moist 02/18/22 1000   Red (%), Wound Tissue Color 100 % 02/18/22 1000   Periwound Area Intact 02/18/22 1000   Wound Edges Open 02/18/22 1000   Wound Length (cm) 0.5 cm 02/18/22 1000   Wound Width (cm) 1 cm 02/18/22 1000   Wound Depth (cm) 0.6 cm 02/18/22 1000   Wound Volume (cm^3) 0.3 cm^3 02/18/22 1000   Wound Surface Area (cm^2) 0.5 cm^2 02/18/22 1000   Care Cleansed with:;Sterile normal saline 02/18/22 1000   Dressing Applied;Island/border 02/18/22 1000   Packing packed with;strip gauze 02/18/22 1000   Packing Inserted  1 02/18/22 1000   Packing Removed 1 02/18/22 1000   Periwound Care Skin barrier film applied 02/18/22 1000   Dressing Change Due 02/21/22 02/18/22 1000            Incision/Site 09/10/21 1300 Left Thigh posterior (Active)   09/10/21 1300   Present Prior to Hospital Arrival?:    Side: Left   Location: Thigh   Orientation: posterior   Incision Type:    Closure Method:    Additional Comments: abscess   Removal Indication and Assessment:    Wound Outcome:    Removal Indications:    Wound Image   02/18/22 1000   Dressing Appearance Intact;Moist drainage 02/18/22 1000   Drainage Amount Moderate 02/18/22 1000   Drainage Characteristics/Odor Serosanguineous 02/18/22 1000   Appearance Red;Moist 02/18/22 1000   Red (%), Wound Tissue Color 100 % 02/18/22 1000   Periwound Area Intact 02/18/22 1000   Wound Edges Open 02/18/22 1000   Wound Length (cm) 0.4 cm 02/18/22 1000   Wound Width (cm) 1.1 cm 02/18/22 1000   Wound Depth (cm) 2.3 cm 02/18/22 1000   Wound Volume (cm^3) 1.012 cm^3 02/18/22 1000   Wound Surface Area (cm^2) 0.44 cm^2 02/18/22 1000   Care Cleansed  with:;Sterile normal saline 02/18/22 1000   Dressing Applied;Island/border 02/18/22 1000   Packing packed with;strip gauze 02/18/22 1000   Packing Inserted  1 02/18/22 1000   Packing Removed 1 02/18/22 1000   Periwound Care Skin barrier film applied 02/18/22 1000   Dressing Change Due 02/21/22 02/18/22 1000                     Assessment:         ICD-10-CM ICD-9-CM   1. Pressure injury of left thigh, stage 3  L89.223 707.09     707.23   2. Open wound of abdominal wall, sequela  S31.109S 906.0       Slight improvement to all wounds. May need to excise skin bridge on thigh wound.  Plan:   Tissue pathology and/or culture taken:  [] Yes [x] No   Sharp debridement performed:   [] Yes [x] No   Labs ordered this visit:   [] Yes [x] No   Imaging ordered this visit:   [] Yes [x] No           Orders Placed This Encounter   Procedures    Change dressing     Left Amputation Incisional Site   Cleanse wound with: Normal Saline   Lidocaine: PRN   Silver nitrate: PRN   Periwound care: Cavilon   Primary dressing: Antimicrobial packing packed lightly to wound   Secondary dressing: Border foam     Left posterior thigh   Cleanse wound with: Normal Saline   Lidocaine: PRN   Silver nitrate: PRN   Periwound care: Cavilon   Primary dressing: Antimicrobial packing packed lightly to wound   Secondary dressing: Border foam     Left Abdominal wound   Cleanse wound with: Normal Saline   Lidocaine: PRN   Silver nitrate: PRN   Periwound care: Cavilon   Primary dressing:  Border foam     Frequency: Monday, Wed, Fri and Prn     Follow-up: Friday 03/04/22 with Dr. Conroy     [facility]: Fax #[phone] Change dressings Monday, Wed, Fridays and   Prn, except when in clinic.        Follow up in about 2 weeks (around 3/4/2022) for Dr. Conroy.

## 2022-03-14 ENCOUNTER — HOSPITAL ENCOUNTER (OUTPATIENT)
Dept: WOUND CARE | Facility: HOSPITAL | Age: 72
Discharge: HOME | End: 2022-03-14
Attending: PREVENTIVE MEDICINE
Payer: MEDICARE

## 2022-03-14 VITALS
HEIGHT: 63 IN | WEIGHT: 166 LBS | BODY MASS INDEX: 29.41 KG/M2 | HEART RATE: 80 BPM | DIASTOLIC BLOOD PRESSURE: 70 MMHG | TEMPERATURE: 98 F | SYSTOLIC BLOOD PRESSURE: 164 MMHG

## 2022-03-14 DIAGNOSIS — Z89.612 S/P AKA (ABOVE KNEE AMPUTATION) UNILATERAL, LEFT: ICD-10-CM

## 2022-03-14 DIAGNOSIS — L89.223 PRESSURE INJURY OF LEFT THIGH, STAGE 3: Primary | ICD-10-CM

## 2022-03-14 DIAGNOSIS — T87.89 NON-HEALING AMPUTATION SITE: ICD-10-CM

## 2022-03-14 PROCEDURE — 11042 DBRDMT SUBQ TIS 1ST 20SQCM/<: CPT

## 2022-03-14 PROCEDURE — 27201912 HC WOUND CARE DEBRIDEMENT SUPPLIES

## 2022-03-14 NOTE — PROGRESS NOTES
Subjective:       Patient ID: Raleigh Blair is a 71 y.o. male.    Chief Complaint: Non-healing Wound (Left stump incision)    3/14/22: F/U with Dr. Conroy. Abdomen wound resolved. Left stump and hip sites debrided per Dr. Conroy. Continue POC. Patient has new left elbow skin tear. Aquacel AG and bordered foam applied. Labs ordered for HH to draw. Return in 2 weeks.    Review of Systems   All other systems reviewed and are negative.        Objective:      Temp:  [98.1 °F (36.7 °C)]   Pulse:  [80]   BP: (164)/(70)   Physical Exam       Wound 03/14/22 1100 Skin Tear Left Elbow (Active)   03/14/22 1100    Pre-existing: Yes   Primary Wound Type: Skin tear   Side: Left   Orientation:    Location: Elbow   Wound Number:    Ankle-Brachial Index:    Pulses:    Removal Indication and Assessment:    Wound Outcome:    (Retired) Wound Type:    (Retired) Wound Length (cm):    (Retired) Wound Width (cm):    (Retired) Depth (cm):    Wound Description (Comments):    Removal Indications:    Wound Image   03/14/22 1100   Dressing Appearance Intact;Moist drainage 03/14/22 1100   Drainage Amount Scant 03/14/22 1100   Drainage Characteristics/Odor Serosanguineous 03/14/22 1100   Appearance Red;Moist 03/14/22 1100   Tissue loss description Partial thickness 03/14/22 1100   Red (%), Wound Tissue Color 100 % 03/14/22 1100   Periwound Area Dry;Intact 03/14/22 1100   Wound Edges Irregular 03/14/22 1100   Wound Length (cm) 0.4 cm 03/14/22 1100   Wound Width (cm) 1 cm 03/14/22 1100   Wound Depth (cm) 0.1 cm 03/14/22 1100   Wound Volume (cm^3) 0.04 cm^3 03/14/22 1100   Wound Surface Area (cm^2) 0.4 cm^2 03/14/22 1100   Care Cleansed with:;Sterile normal saline 03/14/22 1100   Dressing Applied;Calcium alginate;Silver;Island/border 03/14/22 1100   Periwound Care Dry periwound area maintained 03/14/22 1100   Dressing Change Due 03/16/22 03/14/22 1100            Incision/Site 09/10/21 1300 Left Other (see comments) (Active)   09/10/21 1300    Present Prior to Hospital Arrival?:    Side: Left   Location: Other (see comments)   Orientation:    Incision Type:    Closure Method:    Additional Comments:    Removal Indication and Assessment:    Wound Outcome: Amputation   Removal Indications:    Wound Image   03/14/22 1100   Dressing Appearance Moist drainage 03/14/22 1100   Drainage Amount Small 03/14/22 1100   Drainage Characteristics/Odor Serosanguineous 03/14/22 1100   Appearance Red;Moist 03/14/22 1100   Red (%), Wound Tissue Color 100 % 03/14/22 1100   Periwound Area Intact 03/14/22 1100   Wound Edges Open 03/14/22 1100   Wound Length (cm) 0.6 cm 03/14/22 1100   Wound Width (cm) 1 cm 03/14/22 1100   Wound Depth (cm) 1.2 cm 03/14/22 1100   Wound Volume (cm^3) 0.72 cm^3 03/14/22 1100   Wound Surface Area (cm^2) 0.6 cm^2 03/14/22 1100   Care Cleansed with:;Sterile normal saline;Debrided 03/14/22 1100   Dressing Applied;Island/border 03/14/22 1100   Packing packed with;gauze, iodoform 03/14/22 1100   Packing Inserted  1 03/14/22 1100   Packing Removed 1 03/14/22 1100   Periwound Care Skin barrier film applied 03/14/22 1100   Dressing Change Due 03/16/22 03/14/22 1100            Incision/Site 09/10/21 1300 Left Thigh posterior (Active)   09/10/21 1300   Present Prior to Hospital Arrival?:    Side: Left   Location: Thigh   Orientation: posterior   Incision Type:    Closure Method:    Additional Comments: abscess   Removal Indication and Assessment:    Wound Outcome:    Removal Indications:    Wound Image   03/14/22 1100   Dressing Appearance Moist drainage 03/14/22 1100   Drainage Amount Moderate 03/14/22 1100   Drainage Characteristics/Odor Serosanguineous 03/14/22 1100   Appearance Red;Moist 03/14/22 1100   Red (%), Wound Tissue Color 100 % 03/14/22 1100   Periwound Area Intact 03/14/22 1100   Wound Edges Open 03/14/22 1100   Wound Length (cm) 0.5 cm 03/14/22 1100   Wound Width (cm) 0.5 cm 03/14/22 1100   Wound Depth (cm) 1.5 cm 03/14/22 1100   Wound  Volume (cm^3) 0.375 cm^3 03/14/22 1100   Wound Surface Area (cm^2) 0.25 cm^2 03/14/22 1100   Care Cleansed with:;Sterile normal saline;Debrided 03/14/22 1100   Dressing Applied;Island/border 03/14/22 1100   Packing packed with;gauze, iodoform 03/14/22 1100   Packing Inserted  1 03/14/22 1100   Packing Removed 1 03/14/22 1100   Periwound Care Skin barrier film applied 03/14/22 1100   Dressing Change Due 03/16/22 03/14/22 1100         Assessment:         ICD-10-CM ICD-9-CM   1. Pressure injury of left thigh, stage 3  L89.223 707.09     707.23   2. Non-healing amputation site  T87.89 997.69   3. S/P AKA (above knee amputation) unilateral, left  Z89.612 V49.76         Plan:   Tissue pathology and/or culture taken:  [] Yes [x] No   Sharp debridement performed:   [x] Yes [] No   Labs ordered this visit:   [] Yes [x] No   Imaging ordered this visit:   [] Yes [x] No           Orders Placed This Encounter   Procedures    Hemoglobin A1c     Standing Status:   Future     Standing Expiration Date:   5/13/2023    Sedimentation rate     Standing Status:   Future     Standing Expiration Date:   5/13/2023    C-reactive protein     Standing Status:   Future     Standing Expiration Date:   5/13/2023    Change dressing     Left Amputation Incisional Site   Cleanse wound with: Normal Saline   Lidocaine: PRN   Silver nitrate: PRN   Periwound care: Cavilon   Primary dressing: Antimicrobial packing packed lightly to wound   Secondary dressing: Border foam     Left posterior thigh   Cleanse wound with: Normal Saline   Lidocaine: PRN   Silver nitrate: PRN   Periwound care: Cavilon   Primary dressing: Antimicrobial packing packed lightly to wound   Secondary dressing: Border foam     Left elbow:  Cleanse wound with: Normal Saline   Lidocaine: PRN   Silver nitrate: PRN   Periwound care: Cavilon   Primary dressing:  Aquacel AG  Secondary dressing: bordered foam    Frequency: Monday, Wed, Fri and Prn     Follow-up: Friday 03/25/22 with   Rafiq     Home Health: [facility]: Fax #[phone] Change dressings Monday, Wed, Fridays and   Prn, except when in clinic.        Follow up in about 11 days (around 3/25/2022).

## 2022-03-18 NOTE — PROCEDURES
"Debridement    Date/Time: 3/14/2022 10:51 AM  Performed by: Boyd Conroy MD  Authorized by: Boyd Conroy MD     Time out: Immediately prior to procedure a "time out" was called to verify the correct patient, procedure, equipment, support staff and site/side marked as required.    Consent Done?:  Yes (Written)  Local anesthesia used?: Yes    Local anesthetic:  Topical anesthetic    Wound Details:    Location:  Left leg (AKA)    Type of Debridement:  Excisional       Length (cm):  0.6       Area (sq cm):  0.6       Width (cm):  1       Percent Debrided (%):  100       Depth (cm):  1.2       Total Area Debrided (sq cm):  0.6    Depth of debridement:  Subcutaneous tissue    Tissue debrided:  Subcutaneous    Devitalized tissue debrided:  Slough, Fibrin and Clots    Instruments:  Curette    Additional wounds:  1    2nd Wound Details:     Location:  Left leg (thigh)    Type of Debridement:  Excisional       Length (cm):  0.5       Area (sq cm):  0.25       Width (cm):  0.5       Percent Debrided (%):  100       Depth (cm):  1.5       Total Area Debrided (sq cm):  0.25    Depth of debridement:  Subcutaneous tissue    Tissue debrided:  Subcutaneous    Devitalized tissue debrided:  Slough, Fibrin and Exudate    Instruments:  Curette    Bleeding:  Minimal  Hemostasis Achieved: Yes    Method Used:  Pressure  Patient tolerance:  Patient tolerated the procedure well with no immediate complications    Tissue pathology and/or culture taken : [] Yes [x] No     "

## 2022-03-25 ENCOUNTER — HOSPITAL ENCOUNTER (OUTPATIENT)
Dept: WOUND CARE | Facility: HOSPITAL | Age: 72
Discharge: HOME | End: 2022-03-25
Attending: PREVENTIVE MEDICINE
Payer: MEDICARE

## 2022-03-25 VITALS
HEART RATE: 81 BPM | DIASTOLIC BLOOD PRESSURE: 76 MMHG | BODY MASS INDEX: 29.41 KG/M2 | WEIGHT: 166 LBS | TEMPERATURE: 98 F | HEIGHT: 63 IN | SYSTOLIC BLOOD PRESSURE: 146 MMHG

## 2022-03-25 DIAGNOSIS — M86.159 OTHER ACUTE OSTEOMYELITIS, UNSPECIFIED FEMUR: ICD-10-CM

## 2022-03-25 DIAGNOSIS — T87.89 NON-HEALING AMPUTATION SITE: Primary | ICD-10-CM

## 2022-03-25 DIAGNOSIS — T81.49XA INCISIONAL ABSCESS: ICD-10-CM

## 2022-03-25 DIAGNOSIS — M65.052: ICD-10-CM

## 2022-03-25 DIAGNOSIS — Z89.612 S/P AKA (ABOVE KNEE AMPUTATION) UNILATERAL, LEFT: ICD-10-CM

## 2022-03-25 DIAGNOSIS — L89.223 PRESSURE INJURY OF LEFT THIGH, STAGE 3: ICD-10-CM

## 2022-03-25 PROCEDURE — 87186 SC STD MICRODIL/AGAR DIL: CPT | Performed by: PREVENTIVE MEDICINE

## 2022-03-25 PROCEDURE — 87075 CULTR BACTERIA EXCEPT BLOOD: CPT | Performed by: PREVENTIVE MEDICINE

## 2022-03-25 PROCEDURE — 87070 CULTURE OTHR SPECIMN AEROBIC: CPT | Performed by: PREVENTIVE MEDICINE

## 2022-03-25 PROCEDURE — 87077 CULTURE AEROBIC IDENTIFY: CPT | Mod: 59 | Performed by: PREVENTIVE MEDICINE

## 2022-03-25 PROCEDURE — 99214 OFFICE O/P EST MOD 30 MIN: CPT

## 2022-03-25 NOTE — PROGRESS NOTES
Subjective:       Patient ID: Raleigh Blair is a 71 y.o. male.    Chief Complaint: Non-healing Wound Follow Up    Follow up for nonhealing wounds. Patient accompanied by his wife and son. Sed rate and Crp out of normal range. CT scan of LLE ordered. Culture of LLE stump and left posterior thigh taken today. Elbow wound resolved. New wound to left buttock, applied silver border foam. Continue with current plan of care to all other wounds. Updated orders sent to home health.    Review of Systems   All other systems reviewed and are negative.        Objective:      Temp:  [97.9 °F (36.6 °C)]   Pulse:  [81]   BP: (146)/(76)   Physical Exam       Altered Skin Integrity 03/25/22 1049 Left Buttocks Partial thickness tissue loss. Shallow open ulcer with a red or pink wound bed, without slough. Intact or Open/Ruptured Serum-filled blister. (Active)   03/25/22 1049   Altered Skin Integrity Present on Admission:    Side: Left   Orientation:    Location: Buttocks   Wound Number:    Is this injury device related?:    Primary Wound Type:    Description of Altered Skin Integrity: Partial thickness tissue loss. Shallow open ulcer with a red or pink wound bed, without slough. Intact or Open/Ruptured Serum-filled blister.   Removal Indication and Assessment:    Wound Outcome:    (Retired) Wound Length (cm):    (Retired) Wound Width (cm):    (Retired) Depth (cm):    Wound Description (Comments):    Removal Indications:    Wound Image   03/25/22 1000   Description of Altered Skin Integrity Partial thickness tissue loss. Shallow open ulcer with a red or pink wound bed, without slough. Intact or Open/Ruptured Serum-filled blister. 03/25/22 1000   Dressing Appearance Intact 03/25/22 1000   Drainage Amount Small 03/25/22 1000   Drainage Characteristics/Odor Serosanguineous 03/25/22 1000   Appearance Red;Pink 03/25/22 1000   Tissue loss description Partial thickness 03/25/22 1000   Red (%), Wound Tissue Color 100 % 03/25/22 1000   Wound  Edges Irregular 03/25/22 1000   Wound Length (cm) 0.4 cm 03/25/22 1000   Wound Width (cm) 1.5 cm 03/25/22 1000   Wound Depth (cm) 0.1 cm 03/25/22 1000   Wound Volume (cm^3) 0.06 cm^3 03/25/22 1000   Wound Surface Area (cm^2) 0.6 cm^2 03/25/22 1000   Care Cleansed with:;Sterile normal saline 03/25/22 1000   Dressing Applied;Silver;Island/border 03/25/22 1000   Periwound Care Skin barrier film applied 03/25/22 1000   Dressing Change Due 03/28/22 03/25/22 1000            Incision/Site 09/10/21 1300 Left Other (see comments) (Active)   09/10/21 1300   Present Prior to Hospital Arrival?:    Side: Left   Location: Other (see comments)   Orientation:    Incision Type:    Closure Method:    Additional Comments:    Removal Indication and Assessment:    Wound Outcome: Amputation   Removal Indications:    Wound Image   03/25/22 1000   Dressing Appearance Intact;Moist drainage 03/25/22 1000   Drainage Amount Moderate 03/25/22 1000   Drainage Characteristics/Odor Yellow 03/25/22 1000   Appearance Yellow;Moist 03/25/22 1000   Yellow (%), Wound Tissue Color 100 % 03/25/22 1000   Periwound Area Intact;Satellite lesion 03/25/22 1000   Wound Edges Open 03/25/22 1000   Wound Length (cm) 0.4 cm 03/25/22 1000   Wound Width (cm) 0.8 cm 03/25/22 1000   Wound Depth (cm) 1.5 cm 03/25/22 1000   Wound Volume (cm^3) 0.48 cm^3 03/25/22 1000   Wound Surface Area (cm^2) 0.32 cm^2 03/25/22 1000   Care Cleansed with:;Sterile normal saline 03/25/22 1000   Dressing Applied;Island/border 03/25/22 1000   Packing packed with;strip gauze 03/25/22 1000   Packing Inserted  1 03/25/22 1000   Packing Removed 1 03/25/22 1000   Periwound Care Skin barrier film applied 03/25/22 1000   Dressing Change Due 03/28/22 03/25/22 1000            Incision/Site 09/10/21 1300 Left Thigh posterior (Active)   09/10/21 1300   Present Prior to Hospital Arrival?:    Side: Left   Location: Thigh   Orientation: posterior   Incision Type:    Closure Method:    Additional  Comments: abscess   Removal Indication and Assessment:    Wound Outcome:    Removal Indications:    Wound Image   03/25/22 1000   Dressing Appearance Intact;Moist drainage 03/25/22 1000   Drainage Amount Moderate 03/25/22 1000   Drainage Characteristics/Odor Serosanguineous 03/25/22 1000   Appearance Red;Moist 03/25/22 1000   Red (%), Wound Tissue Color 100 % 03/25/22 1000   Periwound Area Intact 03/25/22 1000   Wound Edges Open 03/25/22 1000   Wound Length (cm) 0.5 cm 03/25/22 1000   Wound Width (cm) 0.3 cm 03/25/22 1000   Wound Depth (cm) 2.4 cm 03/25/22 1000   Wound Volume (cm^3) 0.36 cm^3 03/25/22 1000   Wound Surface Area (cm^2) 0.15 cm^2 03/25/22 1000   Care Cleansed with:;Sterile normal saline 03/25/22 1000   Dressing Applied;Island/border 03/25/22 1000   Packing packed with;strip gauze 03/25/22 1000   Packing Inserted  1 03/25/22 1000   Packing Removed 1 03/25/22 1000   Periwound Care Skin barrier film applied 03/25/22 1000   Dressing Change Due 03/28/22 03/25/22 1000       [REMOVED]      Wound 03/14/22 1100 Skin Tear Left Elbow (Removed)   03/14/22 1100    Pre-existing: Yes   Primary Wound Type: Skin tear   Side: Left   Orientation:    Location: Elbow   Wound Number:    Ankle-Brachial Index:    Pulses:    Removal Indication and Assessment:    Wound Outcome: Healed   (Retired) Wound Type:    (Retired) Wound Length (cm):    (Retired) Wound Width (cm):    (Retired) Depth (cm):    Wound Description (Comments):    Removal Indications:    Removed 03/25/22 1048   Wound Image   03/25/22 1000   Dressing Appearance Intact;Dry 03/25/22 1000   Drainage Amount None 03/25/22 1000   Appearance Epithelialization 03/25/22 1000   Tissue loss description Not applicable 03/25/22 1000   Wound Length (cm) 0 cm 03/25/22 1000   Wound Width (cm) 0 cm 03/25/22 1000   Wound Depth (cm) 0 cm 03/25/22 1000   Wound Volume (cm^3) 0 cm^3 03/25/22 1000   Wound Surface Area (cm^2) 0 cm^2 03/25/22 1000   Dressing Applied;Island/border  03/25/22 1000         Assessment:         ICD-10-CM ICD-9-CM   1. Non-healing amputation site  T87.89 997.69   2. Incisional abscess  T81.49XA 998.59   3. Pressure injury of left thigh, stage 3  L89.223 707.09     707.23   4. Abscess of tendon of left thigh  M65.052 727.89   5. S/P AKA (above knee amputation) unilateral, left  Z89.612 V49.76   6. Other acute osteomyelitis, unspecified femur  M86.159 730.05         Plan:   Tissue pathology and/or culture taken:  [x] Yes [] No   Sharp debridement performed:   [] Yes [x] No   Labs ordered this visit:   [] Yes [x] No   Imaging ordered this visit:   [x] Yes [] No           Orders Placed This Encounter   Procedures    Aerobic culture          Anaerobic culture          Aerobic culture          Anaerobic culture          CT Thigh Without Contrast Left     Standing Status:   Future     Standing Expiration Date:   3/25/2023     Order Specific Question:   May the Radiologist modify the order per protocol to meet the clinical needs of the patient?     Answer:   Yes     Order Specific Question:   Access port per protocol?     Answer:   No    Change dressing     Left Amputation Incisional Site   Cleanse wound with: Normal Saline   Lidocaine: PRN   Silver nitrate: PRN   Periwound care: Cavilon   Primary dressing: Antimicrobial packing packed lightly to wound   Secondary dressing: Border foam   Culture taken on 03/25/22    Left posterior thigh   Cleanse wound with: Normal Saline   Lidocaine: PRN   Silver nitrate: PRN   Periwound care: Cavilon   Primary dressing: Antimicrobial packing packed lightly to wound   Secondary dressing: Border foam   Culture taken on 03/25/22    Left elbow & Left Abdomen (resolved)  Protect with border foam    Left Buttocks  Apply Silver border foam    Frequency: Monday, Wed, Fri and Prn     Follow-up: Friday 04/01/22 with Dr. Conroy   Ct scan LLE scheduled on 03/30/22    Home Health: [facility]: Fax #[phone] Change dressings Monday, Wed,  Fridays and   Prn, except when in clinic.        Follow up in about 1 week (around 4/1/2022) for Dr. Conroy.

## 2022-03-28 LAB
BACTERIA SPEC AEROBE CULT: ABNORMAL
BACTERIA SPEC AEROBE CULT: ABNORMAL

## 2022-03-30 LAB
BACTERIA SPEC ANAEROBE CULT: NORMAL
BACTERIA SPEC ANAEROBE CULT: NORMAL

## 2022-03-31 LAB
BACTERIA SPEC AEROBE CULT: ABNORMAL

## 2022-04-01 ENCOUNTER — HOSPITAL ENCOUNTER (OUTPATIENT)
Dept: WOUND CARE | Facility: HOSPITAL | Age: 72
Discharge: HOME | End: 2022-04-01
Attending: PREVENTIVE MEDICINE
Payer: MEDICARE

## 2022-04-01 VITALS
BODY MASS INDEX: 29.41 KG/M2 | HEART RATE: 88 BPM | DIASTOLIC BLOOD PRESSURE: 61 MMHG | HEIGHT: 63 IN | TEMPERATURE: 98 F | SYSTOLIC BLOOD PRESSURE: 126 MMHG | WEIGHT: 166 LBS

## 2022-04-01 DIAGNOSIS — T81.49XA INCISIONAL ABSCESS: ICD-10-CM

## 2022-04-01 DIAGNOSIS — T87.89 NON-HEALING AMPUTATION SITE: Primary | ICD-10-CM

## 2022-04-01 DIAGNOSIS — Z89.612 S/P AKA (ABOVE KNEE AMPUTATION) UNILATERAL, LEFT: ICD-10-CM

## 2022-04-01 DIAGNOSIS — L89.223 PRESSURE INJURY OF LEFT THIGH, STAGE 3: ICD-10-CM

## 2022-04-01 PROCEDURE — 99214 OFFICE O/P EST MOD 30 MIN: CPT

## 2022-04-01 NOTE — PROGRESS NOTES
Subjective:       Patient ID: Raleigh Blair is a 71 y.o. male.    Chief Complaint: No chief complaint on file.    Follow up appt. Patient's spouse states that CT scan needs to be rescheduled. Culture results reviewedand IV antibiotics being arranged through [facility].Plan of care instructed to patient and spouse who voiced understanding. Orders sent to  for wound care. F/U 4/18/22    Review of Systems   All other systems reviewed and are negative.        Objective:      Temp:  [97.9 °F (36.6 °C)]   Pulse:  [88]   BP: (126)/(61)   Physical Exam       Altered Skin Integrity 03/25/22 1049 Left Buttocks Partial thickness tissue loss. Shallow open ulcer with a red or pink wound bed, without slough. Intact or Open/Ruptured Serum-filled blister. (Active)   03/25/22 1049   Altered Skin Integrity Present on Admission:    Side: Left   Orientation:    Location: Buttocks   Wound Number:    Is this injury device related?:    Primary Wound Type:    Description of Altered Skin Integrity: Partial thickness tissue loss. Shallow open ulcer with a red or pink wound bed, without slough. Intact or Open/Ruptured Serum-filled blister.   Removal Indication and Assessment:    Wound Outcome:    (Retired) Wound Length (cm):    (Retired) Wound Width (cm):    (Retired) Depth (cm):    Wound Description (Comments):    Removal Indications:    Dressing Appearance Dry;Intact 04/01/22 1400   Drainage Amount None 04/01/22 1400   Appearance Pink 04/01/22 1400   Tissue loss description Partial thickness 04/01/22 1400   Red (%), Wound Tissue Color 100 % 04/01/22 1400   Wound Edges Defined 04/01/22 1400   Wound Length (cm) 0.3 cm 04/01/22 1400   Wound Width (cm) 0.8 cm 04/01/22 1400   Wound Depth (cm) 0.1 cm 04/01/22 1400   Wound Volume (cm^3) 0.024 cm^3 04/01/22 1400   Wound Surface Area (cm^2) 0.24 cm^2 04/01/22 1400   Care Cleansed with:;Sterile normal saline 04/01/22 1400   Dressing Applied;Silver;Island/border 04/01/22 1400   Periwound  Care Absorptive dressing applied 04/01/22 1400   Dressing Change Due 04/04/22 04/01/22 1400            Incision/Site 09/10/21 1300 Left Other (see comments) (Active)   09/10/21 1300   Present Prior to Hospital Arrival?:    Side: Left   Location: Other (see comments)   Orientation:    Incision Type:    Closure Method:    Additional Comments:    Removal Indication and Assessment:    Wound Outcome: Amputation   Removal Indications:    Dressing Appearance Dry;Intact;Moist drainage 04/01/22 1400   Drainage Amount Small 04/01/22 1400   Drainage Characteristics/Odor Serosanguineous 04/01/22 1400   Appearance Red 04/01/22 1400   Red (%), Wound Tissue Color 100 % 04/01/22 1400   Periwound Area Dry;Intact 04/01/22 1400   Wound Edges Irregular 04/01/22 1400   Wound Length (cm) 0.4 cm 04/01/22 1400   Wound Width (cm) 0.4 cm 04/01/22 1400   Wound Depth (cm) 2.3 cm 04/01/22 1400   Wound Volume (cm^3) 0.368 cm^3 04/01/22 1400   Wound Surface Area (cm^2) 0.16 cm^2 04/01/22 1400   Care Cleansed with:;Sterile normal saline 04/01/22 1400   Dressing Applied;Island/border;Other (comment) 04/01/22 1400   Packing gauze, iodoform 04/01/22 1400   Packing Inserted  1 04/01/22 1400   Periwound Care Absorptive dressing applied 04/01/22 1400   Dressing Change Due 04/04/22 04/01/22 1400            Incision/Site 09/10/21 1300 Left Thigh posterior (Active)   09/10/21 1300   Present Prior to Hospital Arrival?:    Side: Left   Location: Thigh   Orientation: posterior   Incision Type:    Closure Method:    Additional Comments: abscess   Removal Indication and Assessment:    Wound Outcome:    Removal Indications:    Dressing Appearance Dry;Intact;Moist drainage 04/01/22 1400   Drainage Amount Small 04/01/22 1400   Drainage Characteristics/Odor Serosanguineous 04/01/22 1400   Appearance Red 04/01/22 1400   Red (%), Wound Tissue Color 100 % 04/01/22 1400   Periwound Area Dry;Intact 04/01/22 1400   Wound Edges Irregular 04/01/22 1400   Wound Length (cm)  0.3 cm 04/01/22 1400   Wound Width (cm) 0.9 cm 04/01/22 1400   Wound Depth (cm) 1.5 cm 04/01/22 1400   Wound Volume (cm^3) 0.405 cm^3 04/01/22 1400   Wound Surface Area (cm^2) 0.27 cm^2 04/01/22 1400   Care Cleansed with:;Sterile normal saline 04/01/22 1400   Dressing Applied;Island/border;Other (comment) 04/01/22 1400   Packing gauze, iodoform 04/01/22 1400   Packing Inserted  1 04/01/22 1400   Periwound Care Absorptive dressing applied 04/01/22 1400   Dressing Change Due 04/04/22 04/01/22 1400         Assessment:         ICD-10-CM ICD-9-CM   1. Non-healing amputation site  T87.89 997.69   2. Pressure injury of left thigh, stage 3  L89.223 707.09     707.23   3. Incisional abscess  T81.49XA 998.59   4. S/P AKA (above knee amputation) unilateral, left  Z89.612 V49.76         Plan:   Tissue pathology and/or culture taken:  [] Yes [x] No   Sharp debridement performed:   [] Yes [x] No   Labs ordered this visit:   [] Yes [x] No   Imaging ordered this visit:   [] Yes [x] No           Orders Placed This Encounter   Procedures    Change dressing     Left Amputation Incisional Site   Cleanse wound with: Normal Saline   Lidocaine: PRN   Silver nitrate: PRN   Periwound care: Cavilon   Primary dressing: Antimicrobial packing packed lightly to wound   Secondary dressing: Border foam       Left posterior thigh   Cleanse wound with: Normal Saline   Lidocaine: PRN   Silver nitrate: PRN   Periwound care: Cavilon   Primary dressing: Antimicrobial packing packed lightly to wound   Secondary dressing: Border foam       Left elbow & Left Abdomen (resolved)   Protect with border foam     Left Buttocks   Apply Silver border foam     Frequency: Monday, Wed, Fri and Prn     Follow-up: Friday 04/18/22 with Dr. Conroy       [facility]: [facility]: Fax #[phone] Change dressings Monday, Wed, Fridays and   Prn, except when in clinic.        Follow up in about 17 days (around 4/18/2022).                 Imaging Studies/EKG/Labs

## 2022-04-04 RX ORDER — DOXYCYCLINE HYCLATE 100 MG
100 TABLET ORAL EVERY 12 HOURS
Qty: 28 TABLET | Refills: 0 | Status: SHIPPED | OUTPATIENT
Start: 2022-04-04 | End: 2022-04-18

## 2022-04-04 NOTE — ASSESSMENT & PLAN NOTE
-Present on admission, repeat pending  -Procal negative  -Afebrile and WBC WNL  -Blood cx from [facility] pending   CLINICAL PHARMACY NOTE: MEDS TO BEDS    Total # of Prescriptions Filled: 2   The following medications were delivered to the patient:  · Mirtazapine 7.5mg  · Sertraline HCL 100mg    Additional Documentation:  [facility] [address] [facility]

## 2022-04-07 ENCOUNTER — HOSPITAL ENCOUNTER (OUTPATIENT)
Dept: RADIOLOGY | Facility: HOSPITAL | Age: 72
Discharge: HOME | End: 2022-04-07
Attending: PREVENTIVE MEDICINE
Payer: MEDICARE

## 2022-04-07 DIAGNOSIS — M86.159 OTHER ACUTE OSTEOMYELITIS, UNSPECIFIED FEMUR: ICD-10-CM

## 2022-04-07 PROCEDURE — 73700 CT LOWER EXTREMITY W/O DYE: CPT | Mod: 26,LT,, | Performed by: RADIOLOGY

## 2022-04-07 PROCEDURE — 73700 CT THIGH WITHOUT CONTRAST LEFT: ICD-10-PCS | Mod: 26,LT,, | Performed by: RADIOLOGY

## 2022-04-07 PROCEDURE — 73700 CT LOWER EXTREMITY W/O DYE: CPT | Mod: TC,LT

## 2022-04-18 ENCOUNTER — HOSPITAL ENCOUNTER (OUTPATIENT)
Dept: WOUND CARE | Facility: HOSPITAL | Age: 72
Discharge: HOME | End: 2022-04-18
Attending: PREVENTIVE MEDICINE
Payer: MEDICARE

## 2022-04-18 VITALS
HEART RATE: 82 BPM | HEIGHT: 63 IN | TEMPERATURE: 98 F | BODY MASS INDEX: 29.41 KG/M2 | SYSTOLIC BLOOD PRESSURE: 124 MMHG | WEIGHT: 166 LBS | DIASTOLIC BLOOD PRESSURE: 65 MMHG

## 2022-04-18 DIAGNOSIS — Z89.612 S/P AKA (ABOVE KNEE AMPUTATION) UNILATERAL, LEFT: ICD-10-CM

## 2022-04-18 DIAGNOSIS — T87.89 NON-HEALING AMPUTATION SITE: Primary | ICD-10-CM

## 2022-04-18 DIAGNOSIS — T81.49XA INCISIONAL ABSCESS: ICD-10-CM

## 2022-04-18 DIAGNOSIS — L89.223 PRESSURE INJURY OF LEFT THIGH, STAGE 3: ICD-10-CM

## 2022-04-18 PROCEDURE — 99214 OFFICE O/P EST MOD 30 MIN: CPT

## 2022-04-18 NOTE — PROGRESS NOTES
Subjective:       Patient ID: Raleigh Blair is a 71 y.o. male.    Chief Complaint: Non-healing Wound Follow Up    4/18/22:  F/U with Dr. Conroy for left stump and posterior thigh wounds.  CT results discussed with patients family per Dr. Conroy.  Buttock wound is resolved.  Continue POC.  Care tolerated well.  Patient to see ID next clinic visit.  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    Review of Systems   All other systems reviewed and are negative.        Objective:      Temp:  [98.2 °F (36.8 °C)]   Pulse:  [82]   BP: (124)/(65)   Physical Exam       Incision/Site 09/10/21 1300 Left Other (see comments) (Active)   09/10/21 1300   Present Prior to Hospital Arrival?:    Side: Left   Location: Other (see comments)   Orientation:    Incision Type:    Closure Method:    Additional Comments:    Removal Indication and Assessment:    Wound Outcome: Amputation   Removal Indications:    Wound Image   04/18/22 1100   Dressing Appearance Intact;Moist drainage 04/18/22 1100   Drainage Amount Small 04/18/22 1100   Drainage Characteristics/Odor Serosanguineous 04/18/22 1100   Appearance Red;Moist 04/18/22 1100   Red (%), Wound Tissue Color 100 % 04/18/22 1100   Periwound Area Intact;Dry 04/18/22 1100   Wound Edges Irregular;Open 04/18/22 1100   Wound Length (cm) 0.5 cm 04/18/22 1100   Wound Width (cm) 1 cm 04/18/22 1100   Wound Depth (cm) 2.8 cm 04/18/22 1100   Wound Volume (cm^3) 1.4 cm^3 04/18/22 1100   Wound Surface Area (cm^2) 0.5 cm^2 04/18/22 1100   Care Cleansed with:;Sterile normal saline 04/18/22 1100   Dressing Applied;Changed;Island/border 04/18/22 1100   Packing gauze, iodoform;packed with 04/18/22 1100   Packing Inserted  1 04/18/22 1100   Packing Removed 1 04/18/22 1100   Periwound Care Absorptive dressing applied;Skin barrier film applied 04/18/22 1100   Dressing Change Due 04/21/22 04/18/22 1100            Incision/Site 09/10/21 1300 Left Thigh posterior  (Active)   09/10/21 1300   Present Prior to Hospital Arrival?:    Side: Left   Location: Thigh   Orientation: posterior   Incision Type:    Closure Method:    Additional Comments: abscess   Removal Indication and Assessment:    Wound Outcome:    Removal Indications:    Dressing Appearance Dry;Intact 04/18/22 1100   Drainage Amount Small 04/18/22 1100   Drainage Characteristics/Odor Purulent;Creamy 04/18/22 1100   Appearance Red 04/18/22 1100   Red (%), Wound Tissue Color 100 % 04/18/22 1100   Periwound Area Dry;Intact 04/18/22 1100   Wound Edges Irregular 04/18/22 1100   Wound Length (cm) 0.3 cm 04/18/22 1100   Wound Width (cm) 3 cm 04/18/22 1100   Wound Depth (cm) 2 cm 04/18/22 1100   Wound Volume (cm^3) 1.8 cm^3 04/18/22 1100   Wound Surface Area (cm^2) 0.9 cm^2 04/18/22 1100   Care Cleansed with:;Sterile normal saline 04/18/22 1100   Dressing Applied;Changed;Island/border 04/18/22 1100   Packing gauze, iodoform;packed with 04/18/22 1100   Packing Inserted  1 04/18/22 1100   Packing Removed 1 04/18/22 1100   Periwound Care Absorptive dressing applied;Skin barrier film applied 04/18/22 1100   Dressing Change Due 04/21/22 04/18/22 1100       [REMOVED]      Altered Skin Integrity 03/25/22 1049 Left Buttocks Partial thickness tissue loss. Shallow open ulcer with a red or pink wound bed, without slough. Intact or Open/Ruptured Serum-filled blister. (Removed)   03/25/22 1049   Altered Skin Integrity Present on Admission:    Side: Left   Orientation:    Location: Buttocks   Wound Number:    Is this injury device related?:    Primary Wound Type:    Description of Altered Skin Integrity: Partial thickness tissue loss. Shallow open ulcer with a red or pink wound bed, without slough. Intact or Open/Ruptured Serum-filled blister.   Removal Indication and Assessment: closed/resurfaced   Wound Outcome: Healed   (Retired) Wound Length (cm):    (Retired) Wound Width (cm):    (Retired) Depth (cm):    Wound Description (Comments):     Removal Indications:    Removed 04/18/22 1147   Dressing Appearance Dry;Intact 04/18/22 1100   Drainage Amount None 04/18/22 1100   Appearance Pink;Closed/resurfaced 04/18/22 1100   Tissue loss description Partial thickness 04/18/22 1100   Wound Edges Rolled/closed 04/18/22 1100         Assessment:         ICD-10-CM ICD-9-CM   1. Non-healing amputation site  T87.89 997.69   2. Pressure injury of left thigh, stage 3  L89.223 707.09     707.23   3. Incisional abscess  T81.49XA 998.59   4. S/P AKA (above knee amputation) unilateral, left  Z89.612 V49.76         Plan:   Tissue pathology and/or culture taken:  [] Yes [x] No   Sharp debridement performed:   [] Yes [x] No   Labs ordered this visit:   [] Yes [x] No   Imaging ordered this visit:   [] Yes [x] No           Orders Placed This Encounter   Procedures    Change dressing     Left Amputation Incisional Site   Cleanse wound with: Normal Saline   Lidocaine: PRN   Silver nitrate: PRN   Periwound care: Cavilon   Primary dressing: Antimicrobial packing packed lightly to wound   Secondary dressing: Border foam     Left posterior thigh   Cleanse wound with: Normal Saline   Lidocaine: PRN   Silver nitrate: PRN   Periwound care: Cavilon   Primary dressing: Antimicrobial packing packed lightly to wound   Secondary dressing: Border foam     Frequency: Monday, Wed, Fri and Prn     Follow-up: Friday 04/25/22 with Dr. Conroy and ID     Home Health: [facility]: Fax #[phone] Change dressings Monday, Wed, Fridays and   Prn, except when in clinic.        Follow up in about 1 week (around 4/25/2022).

## 2022-04-26 ENCOUNTER — DOCUMENT SCAN (OUTPATIENT)
Dept: HOME HEALTH SERVICES | Facility: HOSPITAL | Age: 72
End: 2022-04-26
Payer: MEDICARE

## 2022-05-02 ENCOUNTER — CLINICAL SUPPORT (OUTPATIENT)
Dept: INFECTIOUS DISEASES | Facility: CLINIC | Age: 72
End: 2022-05-02
Payer: MEDICARE

## 2022-05-02 ENCOUNTER — HOSPITAL ENCOUNTER (OUTPATIENT)
Dept: WOUND CARE | Facility: HOSPITAL | Age: 72
Discharge: HOME | End: 2022-05-02
Attending: PREVENTIVE MEDICINE
Payer: MEDICARE

## 2022-05-02 VITALS
TEMPERATURE: 98 F | BODY MASS INDEX: 29.41 KG/M2 | WEIGHT: 166 LBS | SYSTOLIC BLOOD PRESSURE: 177 MMHG | HEIGHT: 63 IN | DIASTOLIC BLOOD PRESSURE: 78 MMHG | HEART RATE: 66 BPM

## 2022-05-02 DIAGNOSIS — M79.652 PAIN OF LEFT THIGH: ICD-10-CM

## 2022-05-02 DIAGNOSIS — L89.223 PRESSURE INJURY OF LEFT THIGH, STAGE 3: ICD-10-CM

## 2022-05-02 DIAGNOSIS — T87.89 NON-HEALING AMPUTATION SITE: Primary | ICD-10-CM

## 2022-05-02 DIAGNOSIS — T81.49XA INCISIONAL ABSCESS: ICD-10-CM

## 2022-05-02 DIAGNOSIS — M86.452: Primary | ICD-10-CM

## 2022-05-02 DIAGNOSIS — Z89.612 S/P AKA (ABOVE KNEE AMPUTATION) UNILATERAL, LEFT: ICD-10-CM

## 2022-05-02 DIAGNOSIS — A49.8 PROTEUS INFECTION: ICD-10-CM

## 2022-05-02 DIAGNOSIS — A49.02 MRSA INFECTION: ICD-10-CM

## 2022-05-02 DIAGNOSIS — T84.7XXA INFECTED HARDWARE IN LEFT LOWER EXTREMITY, INITIAL ENCOUNTER: ICD-10-CM

## 2022-05-02 PROCEDURE — 99215 PR OFFICE/OUTPT VISIT, EST, LEVL V, 40-54 MIN: ICD-10-PCS | Mod: S$GLB,,, | Performed by: INTERNAL MEDICINE

## 2022-05-02 PROCEDURE — 99215 OFFICE O/P EST HI 40 MIN: CPT | Mod: S$GLB,,, | Performed by: INTERNAL MEDICINE

## 2022-05-02 PROCEDURE — 99204 OFFICE O/P NEW MOD 45 MIN: CPT

## 2022-05-02 NOTE — PROGRESS NOTES
Subjective:       Patient ID: Raleigh Blair is a 71 y.o. male.    Chief Complaint: Non-healing Wound Follow Up    5/2/22:  F/U with Dr. Conroy for Left stump and left posterior leg wounds.  No new complaints.  Patient will see ID today @ 1 o'clock.  Continue POC.  Care tolerated well.       Review of Systems   All other systems reviewed and are negative.        Objective:      Temp:  [98.3 °F (36.8 °C)]   Pulse:  [66]   BP: (177)/(78)   Physical Exam       Incision/Site 09/10/21 1300 Left Other (see comments) (Active)   09/10/21 1300   Present Prior to Hospital Arrival?:    Side: Left   Location: Other (see comments)   Orientation:    Incision Type:    Closure Method:    Additional Comments:    Removal Indication and Assessment:    Wound Outcome: Amputation   Removal Indications:    Wound Image   05/02/22 1100   Dressing Appearance Intact;Moist drainage 05/02/22 1100   Drainage Amount Moderate 05/02/22 1100   Drainage Characteristics/Odor Serosanguineous;Creamy 05/02/22 1100   Appearance Red;Moist 05/02/22 1100   Red (%), Wound Tissue Color 100 % 05/02/22 1100   Periwound Area Satellite lesion;Dry 05/02/22 1100   Wound Edges Open 05/02/22 1100   Wound Length (cm) 0.5 cm 05/02/22 1100   Wound Width (cm) 0.6 cm 05/02/22 1100   Wound Depth (cm) 2.5 cm 05/02/22 1100   Wound Volume (cm^3) 0.75 cm^3 05/02/22 1100   Wound Surface Area (cm^2) 0.3 cm^2 05/02/22 1100   Care Cleansed with:;Sterile normal saline 05/02/22 1100   Dressing Applied;Changed;Island/border 05/02/22 1100   Packing gauze, iodoform 05/02/22 1100   Packing Inserted  1 05/02/22 1100   Packing Removed 1 05/02/22 1100   Periwound Care Absorptive dressing applied;Skin barrier film applied 05/02/22 1100   Dressing Change Due 05/04/22 05/02/22 1100            Incision/Site 09/10/21 1300 Left Thigh posterior (Active)   09/10/21 1300   Present Prior to Hospital Arrival?:    Side: Left   Location: Thigh   Orientation: posterior   Incision Type:    Closure  Method:    Additional Comments: abscess   Removal Indication and Assessment:    Wound Outcome:    Removal Indications:    Wound Image   05/02/22 1100   Dressing Appearance Intact 05/02/22 1100   Drainage Amount Moderate 05/02/22 1100   Drainage Characteristics/Odor Serosanguineous;Creamy 05/02/22 1100   Appearance Red;Moist 05/02/22 1100   Red (%), Wound Tissue Color 100 % 05/02/22 1100   Periwound Area Intact 05/02/22 1100   Wound Edges Open 05/02/22 1100   Wound Length (cm) 0.5 cm 05/02/22 1100   Wound Width (cm) 0.4 cm 05/02/22 1100   Wound Depth (cm) 0.4 cm 05/02/22 1100   Wound Volume (cm^3) 0.08 cm^3 05/02/22 1100   Wound Surface Area (cm^2) 0.2 cm^2 05/02/22 1100   Care Cleansed with:;Sterile normal saline 05/02/22 1100   Dressing Applied;Changed;Island/border 05/02/22 1100   Packing gauze, iodoform 05/02/22 1100   Packing Inserted  1 05/02/22 1100   Packing Removed 1 05/02/22 1100   Periwound Care Absorptive dressing applied;Skin barrier film applied 05/02/22 1100   Dressing Change Due 05/04/22 05/02/22 1100         Assessment:         ICD-10-CM ICD-9-CM   1. Non-healing amputation site  T87.89 997.69   2. Pressure injury of left thigh, stage 3  L89.223 707.09     707.23   3. Incisional abscess  T81.49XA 998.59   4. S/P AKA (above knee amputation) unilateral, left  Z89.612 V49.76         Plan:   Tissue pathology and/or culture taken:  [] Yes [x] No   Sharp debridement performed:   [] Yes [x] No   Labs ordered this visit:   [] Yes [x] No   Imaging ordered this visit:   [] Yes [x] No           Orders Placed This Encounter   Procedures    Change dressing     Left Amputation Incisional Site   Cleanse wound with: Normal Saline   Lidocaine: PRN   Silver nitrate: PRN   Periwound care: Cavilon   Primary dressing: Antimicrobial packing packed lightly to wound   Secondary dressing: Border foam     Left posterior thigh   Cleanse wound with: Normal Saline   Lidocaine: PRN   Silver nitrate: PRN   Periwound care: Cavilon    Primary dressing: Antimicrobial packing packed lightly to wound   Secondary dressing: Border foam     Frequency: Monday, Wed, Fri and Prn     Follow-up: with Dr. Conroy in 2 weeks    Home Health: [facility]: Fax #[phone] Change dressings Monday, Wed, Fridays and   Prn, except when in clinic.        Follow up in about 2 weeks (around 5/16/2022).

## 2022-05-02 NOTE — PROGRESS NOTES
"Patient referred for evaluation after treatment for chornic wound with IV antibiotics. 70 yo male with PVD, s/p Left femoral ORIF IN Nov 2019 at [facility], who had L AKA in March 2020 for "bad circulation and gangrene." Needed revision of left AKA on May 5, 2020, but eventually healed by July 2020.    Left AKA turned red in Jan 2021, by history, and eventually he came to [facility] in September 2021 for wound care of left AKA. Treated in wound care since September, multiple cultures last culture on 3/25/22 grew P.mirabilis and MRSA. Finishing 2 weeks of IV ertapenem today.     CT of left femur on 4/7 showed "There is a soft tissue track are defect along the left lateral thigh more proximally with extension to the lateral edge of the femoral neck screw is with a tiny air bubble, series 2, image 173, could represent a small fistulous track, to correlate clinically."    Also: "No significant lucency surrounding the long intramedullary austen." and "No definite soft tissue fluid collection."    Patient is not able to give history - wife gives history. No record of treatment at [facility] is available.     1. Chronic osteomyelitis with draining sinus, left femur    2. Pain of left thigh    3. MRSA infection    4. Proteus infection    5. Infected hardware in left lower extremity, initial encounter        Recommendation:  Stop ertapenem. Midline removed today.  Repeat cultures.  MRI of left femur to evaluate chronic osteomyelitis and infected hardware.  Consider debridement and screw removal for source control.   I spent over 45 minutes on this encounter today, mostly reviewing old records and obtaining history.       "

## 2022-05-10 ENCOUNTER — DOCUMENT SCAN (OUTPATIENT)
Dept: HOME HEALTH SERVICES | Facility: HOSPITAL | Age: 72
End: 2022-05-10
Payer: MEDICARE

## 2022-05-11 ENCOUNTER — DOCUMENT SCAN (OUTPATIENT)
Dept: HOME HEALTH SERVICES | Facility: HOSPITAL | Age: 72
End: 2022-05-11
Payer: MEDICARE

## 2022-05-17 ENCOUNTER — HOSPITAL ENCOUNTER (OUTPATIENT)
Dept: RADIOLOGY | Facility: HOSPITAL | Age: 72
Discharge: HOME | End: 2022-05-17
Attending: INTERNAL MEDICINE
Payer: MEDICARE

## 2022-05-17 DIAGNOSIS — I50.9 CHF (CONGESTIVE HEART FAILURE): ICD-10-CM

## 2022-05-17 DIAGNOSIS — I73.9 PERIPHERAL VASCULAR DISEASE: ICD-10-CM

## 2022-05-17 DIAGNOSIS — M79.652 PAIN OF LEFT THIGH: ICD-10-CM

## 2022-05-17 DIAGNOSIS — Z78.9 IMPAIRED MOBILITY AND ACTIVITIES OF DAILY LIVING: ICD-10-CM

## 2022-05-17 DIAGNOSIS — I10 ESSENTIAL HYPERTENSION: ICD-10-CM

## 2022-05-17 DIAGNOSIS — E03.9 HYPOTHYROIDISM: ICD-10-CM

## 2022-05-17 DIAGNOSIS — R79.89 ELEVATED LACTIC ACID LEVEL: ICD-10-CM

## 2022-05-17 DIAGNOSIS — I62.03 ACUTE ON CHRONIC INTRACRANIAL SUBDURAL HEMATOMA: ICD-10-CM

## 2022-05-17 DIAGNOSIS — T81.49XA INCISIONAL ABSCESS: ICD-10-CM

## 2022-05-17 DIAGNOSIS — E11.9 TYPE 2 DIABETES MELLITUS: ICD-10-CM

## 2022-05-17 DIAGNOSIS — T87.89 NON-HEALING AMPUTATION SITE: ICD-10-CM

## 2022-05-17 DIAGNOSIS — I62.01 ACUTE ON CHRONIC INTRACRANIAL SUBDURAL HEMATOMA: ICD-10-CM

## 2022-05-17 DIAGNOSIS — Z74.09 IMPAIRED MOBILITY AND ACTIVITIES OF DAILY LIVING: ICD-10-CM

## 2022-05-17 DIAGNOSIS — Z89.612 S/P AKA (ABOVE KNEE AMPUTATION) UNILATERAL, LEFT: ICD-10-CM

## 2022-05-17 DIAGNOSIS — E78.5 HYPERLIPIDEMIA: ICD-10-CM

## 2022-05-17 DIAGNOSIS — I69.30 HISTORY OF CVA WITH RESIDUAL DEFICIT: ICD-10-CM

## 2022-05-17 DIAGNOSIS — L89.223 PRESSURE INJURY OF LEFT THIGH, STAGE 3: Primary | ICD-10-CM

## 2022-05-17 DIAGNOSIS — G40.409 TONIC CLONIC SEIZURES: ICD-10-CM

## 2022-05-17 LAB
CREAT SERPL-MCNC: 0.9 MG/DL (ref 0.5–1.4)
SAMPLE: NORMAL

## 2022-05-17 PROCEDURE — 73720 MRI FEMUR W WO CONTRAST LEFT: ICD-10-PCS | Mod: 26,LT,, | Performed by: RADIOLOGY

## 2022-05-17 PROCEDURE — 25500020 PHARM REV CODE 255: Performed by: INTERNAL MEDICINE

## 2022-05-17 PROCEDURE — A9585 GADOBUTROL INJECTION: HCPCS | Performed by: INTERNAL MEDICINE

## 2022-05-17 PROCEDURE — 73720 MRI LWR EXTREMITY W/O&W/DYE: CPT | Mod: 26,LT,, | Performed by: RADIOLOGY

## 2022-05-17 PROCEDURE — 73720 MRI LWR EXTREMITY W/O&W/DYE: CPT | Mod: TC,LT

## 2022-05-17 RX ORDER — GADOBUTROL 604.72 MG/ML
7.5 INJECTION INTRAVENOUS
Status: COMPLETED | OUTPATIENT
Start: 2022-05-17 | End: 2022-05-17

## 2022-05-17 RX ADMIN — GADOBUTROL 7.5 ML: 604.72 INJECTION INTRAVENOUS at 10:05

## 2022-05-23 ENCOUNTER — CLINICAL SUPPORT (OUTPATIENT)
Dept: INFECTIOUS DISEASES | Facility: CLINIC | Age: 72
End: 2022-05-23
Payer: MEDICARE

## 2022-05-23 ENCOUNTER — HOSPITAL ENCOUNTER (OUTPATIENT)
Dept: WOUND CARE | Facility: HOSPITAL | Age: 72
Discharge: HOME | End: 2022-05-23
Attending: PREVENTIVE MEDICINE
Payer: MEDICARE

## 2022-05-23 VITALS
HEART RATE: 91 BPM | BODY MASS INDEX: 29.41 KG/M2 | DIASTOLIC BLOOD PRESSURE: 66 MMHG | TEMPERATURE: 98 F | HEIGHT: 63 IN | SYSTOLIC BLOOD PRESSURE: 137 MMHG | WEIGHT: 166 LBS

## 2022-05-23 DIAGNOSIS — A49.8 PROTEUS MIRABILIS INFECTION: ICD-10-CM

## 2022-05-23 DIAGNOSIS — Z89.612 S/P AKA (ABOVE KNEE AMPUTATION) UNILATERAL, LEFT: ICD-10-CM

## 2022-05-23 DIAGNOSIS — T81.49XA INCISIONAL ABSCESS: ICD-10-CM

## 2022-05-23 DIAGNOSIS — L89.223 PRESSURE INJURY OF LEFT THIGH, STAGE 3: ICD-10-CM

## 2022-05-23 DIAGNOSIS — M86.452 CHRONIC OSTEOMYELITIS OF LEFT FEMUR WITH DRAINING SINUS: Primary | ICD-10-CM

## 2022-05-23 DIAGNOSIS — T87.89 NON-HEALING AMPUTATION SITE: ICD-10-CM

## 2022-05-23 DIAGNOSIS — A49.02 MRSA INFECTION: ICD-10-CM

## 2022-05-23 DIAGNOSIS — T87.89 NON-HEALING AMPUTATION SITE: Primary | ICD-10-CM

## 2022-05-23 DIAGNOSIS — M65.052: ICD-10-CM

## 2022-05-23 PROCEDURE — 87184 SC STD DISK METHOD PER PLATE: CPT | Performed by: PREVENTIVE MEDICINE

## 2022-05-23 PROCEDURE — 87077 CULTURE AEROBIC IDENTIFY: CPT | Mod: 59 | Performed by: PREVENTIVE MEDICINE

## 2022-05-23 PROCEDURE — 99213 PR OFFICE/OUTPT VISIT, EST, LEVL III, 20-29 MIN: ICD-10-PCS | Mod: S$GLB,,, | Performed by: INTERNAL MEDICINE

## 2022-05-23 PROCEDURE — 87186 SC STD MICRODIL/AGAR DIL: CPT | Mod: 59 | Performed by: PREVENTIVE MEDICINE

## 2022-05-23 PROCEDURE — 87075 CULTR BACTERIA EXCEPT BLOOD: CPT | Performed by: PREVENTIVE MEDICINE

## 2022-05-23 PROCEDURE — 99214 OFFICE O/P EST MOD 30 MIN: CPT

## 2022-05-23 PROCEDURE — 87070 CULTURE OTHR SPECIMN AEROBIC: CPT | Performed by: PREVENTIVE MEDICINE

## 2022-05-23 PROCEDURE — 99213 OFFICE O/P EST LOW 20 MIN: CPT | Mod: S$GLB,,, | Performed by: INTERNAL MEDICINE

## 2022-05-23 RX ORDER — RIFAMPIN 300 MG/1
300 CAPSULE ORAL EVERY 12 HOURS
Qty: 60 CAPSULE | Refills: 5 | Status: SHIPPED | OUTPATIENT
Start: 2022-05-23 | End: 2022-06-22

## 2022-05-23 RX ORDER — AMOXICILLIN AND CLAVULANATE POTASSIUM 875; 125 MG/1; MG/1
1 TABLET, FILM COATED ORAL 2 TIMES DAILY
Qty: 60 TABLET | Refills: 5 | Status: SHIPPED | OUTPATIENT
Start: 2022-05-23 | End: 2022-06-22

## 2022-05-23 NOTE — PROGRESS NOTES
"Drainage from left thigh wound continues. Amputation site without drainage. No fevers or chills. No pain in or around the wound. Thinks that his orthopedist was "Dr. PITT, from [facility]" - cannot remember name right now.   Wound has no periwound erythema, no tenderness.   MRI - 5/17  "Status post left above-the-knee amputation and IM austen fixation of chronic proximal femoral fracture with fistulous tract/sinus communicating with the distal tip of the femoral stump strongly suggestive of osteomyelitis.     Fluid collection deep to the skin incision at the insertion site of the femoral neck screw.  Although this is a common site for postoperative collection, sterility cannot be determined and soft tissue abscess is not excluded."    Cultures growing MRSA and Proteus mirabilis.    1. 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    2. S/P AKA (above knee amputation) unilateral, left    3. MRSA infection    4. Non-healing amputation site    5. Incisional abscess    6. Proteus mirabilis infection        Will treat with augmentin and rifampin for now. Recommend removal of femoral screw with debridement.   Will treat indefinitely for chronic osteomyelitis.     Patient's wife will get name of physician who performed ORIF on left leg and make follow up appointment.     Follow up in 1 month. I spent over 20 minutes on this encounter today.      "

## 2022-05-23 NOTE — PROGRESS NOTES
Subjective:       Patient ID: Raleigh Blair is a 71 y.o. male.    Chief Complaint: Non-healing Wound Follow Up    5/23/22:  Nurse visit for wound assessment and dressing change. Patient seen by ID today in clinic.  Culture obtained Left posterior leg wound.  Care tolerated well.  F/U with ID and Dr. Conroy in 4 weeks.  Rx sent to patient pharmacy for Ampicillin and Rifampin.      Review of Systems      Objective:        Physical Exam       Incision/Site 09/10/21 1300 Left Other (see comments) (Active)   09/10/21 1300   Present Prior to Hospital Arrival?:    Side: Left   Location: Other (see comments)   Orientation:    Incision Type:    Closure Method:    Additional Comments:    Removal Indication and Assessment:    Wound Outcome: Amputation   Removal Indications:    Dressing Appearance Intact;Moist drainage 05/23/22 1300   Drainage Amount Moderate 05/23/22 1300   Drainage Characteristics/Odor Serosanguineous 05/23/22 1300   Appearance Red;Moist 05/23/22 1300   Red (%), Wound Tissue Color 100 % 05/23/22 1300   Periwound Area Satellite lesion;Dry 05/23/22 1300   Wound Edges Open 05/23/22 1300   Wound Length (cm) 0.5 cm 05/23/22 1300   Wound Width (cm) 0.6 cm 05/23/22 1300   Wound Depth (cm) 2.5 cm 05/23/22 1300   Wound Volume (cm^3) 0.75 cm^3 05/23/22 1300   Wound Surface Area (cm^2) 0.3 cm^2 05/23/22 1300   Care Cleansed with:;Sterile normal saline 05/23/22 1300   Dressing Applied;Changed;Island/border 05/23/22 1300   Packing gauze, iodoform;packed with 05/23/22 1300   Packing Inserted  1 05/23/22 1300   Periwound Care Absorptive dressing applied;Skin barrier film applied 05/23/22 1300   Dressing Change Due 05/25/22 05/23/22 1300            Incision/Site 09/10/21 1300 Left Thigh posterior (Active)   09/10/21 1300   Present Prior to Hospital Arrival?:    Side: Left   Location: Thigh   Orientation: posterior   Incision Type:    Closure Method:    Additional Comments: abscess   Removal Indication and Assessment:     Wound Outcome:    Removal Indications:    Dressing Appearance Intact;Moist drainage 05/23/22 1300   Drainage Amount Moderate 05/23/22 1300   Drainage Characteristics/Odor Serosanguineous 05/23/22 1300   Appearance Red;Moist 05/23/22 1300   Red (%), Wound Tissue Color 100 % 05/23/22 1300   Periwound Area Intact 05/23/22 1300   Wound Edges Open 05/23/22 1300   Wound Length (cm) 0.5 cm 05/23/22 1300   Wound Width (cm) 0.4 cm 05/23/22 1300   Wound Depth (cm) 0.4 cm 05/23/22 1300   Wound Volume (cm^3) 0.08 cm^3 05/23/22 1300   Wound Surface Area (cm^2) 0.2 cm^2 05/23/22 1300   Care Cleansed with:;Sterile normal saline 05/23/22 1300   Dressing Applied;Changed;Island/border 05/23/22 1300   Packing packed with;gauze, iodoform 05/23/22 1300   Packing Inserted  1 05/23/22 1300   Periwound Care Absorptive dressing applied;Skin barrier film applied 05/23/22 1300   Dressing Change Due 05/25/22 05/23/22 1300         Assessment:         ICD-10-CM ICD-9-CM   1. Non-healing amputation site  T87.89 997.69   2. Pressure injury of left thigh, stage 3  L89.223 707.09     707.23   3. Incisional abscess  T81.49XA 998.59   4. S/P AKA (above knee amputation) unilateral, left  Z89.612 V49.76   5. Abscess of tendon of left thigh  M65.052 727.89       Left Amputation Incisional Site   Cleanse wound with: Normal Saline   Lidocaine: PRN   Silver nitrate: PRN   Periwound care: Cavilon   Primary dressing: Antimicrobial packing packed lightly to wound   Secondary dressing: Border foam     Left posterior thigh   Cleanse wound with: Normal Saline   Lidocaine: PRN   Silver nitrate: PRN   Periwound care: Cavilon   Primary dressing: Antimicrobial packing packed lightly to wound   Secondary dressing: Border foam     Frequency: Monday, Wed, Fri and Prn     Follow-up: with Dr. Conroy in 1wk    [facility]: [facility]: Fax #[phone] Change dressings Monday, Wed, Fridays and   Prn, except when in clinic.  Plan:   Tissue pathology and/or culture  taken:  [x] Yes [] No   Sharp debridement performed:   [] Yes [x] No   Labs ordered this visit:   [] Yes [x] No   Imaging ordered this visit:   [] Yes [x] No           No orders of the defined types were placed in this encounter.       Follow up in about 1 week (around 5/30/2022).

## 2022-05-27 LAB
BACTERIA SPEC AEROBE CULT: ABNORMAL
BACTERIA SPEC AEROBE CULT: ABNORMAL

## 2022-05-30 LAB — BACTERIA SPEC ANAEROBE CULT: NORMAL

## 2022-06-08 ENCOUNTER — TELEPHONE (OUTPATIENT)
Dept: WOUND CARE | Facility: HOSPITAL | Age: 72
End: 2022-06-08
Payer: MEDICARE

## 2022-06-10 RX ORDER — DOXYCYCLINE HYCLATE 100 MG
100 TABLET ORAL 2 TIMES DAILY
Qty: 60 TABLET | Refills: 6 | Status: SHIPPED | OUTPATIENT
Start: 2022-06-10 | End: 2022-07-10

## 2022-06-10 NOTE — TELEPHONE ENCOUNTER
Dr Gordon ordered doxycycline 100mg BID, sent to pt's pharmacy, informed Mrs Blair.  Pt will be having the infected Pin and Joseph removed on 6/20/22, Mrs Blair stated they would call after to reschedule.

## 2022-08-05 ENCOUNTER — HOSPITAL ENCOUNTER (OUTPATIENT)
Dept: WOUND CARE | Facility: HOSPITAL | Age: 72
Discharge: HOME | End: 2022-08-05
Attending: PREVENTIVE MEDICINE
Payer: MEDICARE

## 2022-08-05 VITALS
BODY MASS INDEX: 29.41 KG/M2 | TEMPERATURE: 98 F | HEART RATE: 110 BPM | SYSTOLIC BLOOD PRESSURE: 114 MMHG | WEIGHT: 166 LBS | HEIGHT: 63 IN | DIASTOLIC BLOOD PRESSURE: 58 MMHG

## 2022-08-05 DIAGNOSIS — M86.452 CHRONIC OSTEOMYELITIS OF LEFT FEMUR WITH DRAINING SINUS: ICD-10-CM

## 2022-08-05 DIAGNOSIS — T81.49XA INCISIONAL ABSCESS: ICD-10-CM

## 2022-08-05 DIAGNOSIS — Z89.612 S/P AKA (ABOVE KNEE AMPUTATION) UNILATERAL, LEFT: ICD-10-CM

## 2022-08-05 DIAGNOSIS — T87.89 NON-HEALING AMPUTATION SITE: Primary | ICD-10-CM

## 2022-08-05 PROCEDURE — 87076 CULTURE ANAEROBE IDENT EACH: CPT | Performed by: PREVENTIVE MEDICINE

## 2022-08-05 PROCEDURE — 87075 CULTR BACTERIA EXCEPT BLOOD: CPT | Mod: 59 | Performed by: PREVENTIVE MEDICINE

## 2022-08-05 PROCEDURE — 87186 SC STD MICRODIL/AGAR DIL: CPT | Performed by: PREVENTIVE MEDICINE

## 2022-08-05 PROCEDURE — 99203 OFFICE O/P NEW LOW 30 MIN: CPT

## 2022-08-05 PROCEDURE — 87070 CULTURE OTHR SPECIMN AEROBIC: CPT | Performed by: PREVENTIVE MEDICINE

## 2022-08-05 PROCEDURE — 87077 CULTURE AEROBIC IDENTIFY: CPT | Mod: 59 | Performed by: PREVENTIVE MEDICINE

## 2022-08-05 NOTE — PROGRESS NOTES
[facility]    Subjective:       Patient ID: Raleigh Blair is a 71 y.o. male.    Chief Complaint: Non-healing Wound Follow Up     Patient re-admitted to clinic for non-healing wounds to left AKA and left hip sites after recent revision/hardwear removal surgery. Hardware was sent to path/culture but it could not be run.  Reported significant pain at site.  Wound cultures obtained. New orders routed to home health.    Review of Systems   All other systems reviewed and are negative.        Objective:        Physical Exam       Altered Skin Integrity 08/05/22 0920 Left lateral Hip (Active)   08/05/22 0920   Altered Skin Integrity Present on Admission:    Side: Left   Orientation: lateral   Location: Hip   Wound Number:    Is this injury device related?:    Primary Wound Type:    Description of Altered Skin Integrity:    Removal Indication and Assessment:    Wound Outcome:    (Retired) Wound Length (cm):    (Retired) Wound Width (cm):    (Retired) Depth (cm):    Wound Description (Comments):    Removal Indications:    Wound Image    08/05/22 1008   Description of Altered Skin Integrity Full thickness tissue loss. Subcutaneous fat may be visible but bone, tendon or muscle are not exposed 08/05/22 1008   Drainage Amount Scant 08/05/22 1008   Drainage Characteristics/Odor Serosanguineous 08/05/22 1008   Appearance Red 08/05/22 1008   Red (%), Wound Tissue Color 100 % 08/05/22 1008   Wound Edges Defined;Open 08/05/22 1008   Wound Length (cm) 1.5 cm 08/05/22 1008   Wound Width (cm) 0.4 cm 08/05/22 1008   Wound Depth (cm) 0.1 cm 08/05/22 1008   Wound Volume (cm^3) 0.06 cm^3 08/05/22 1008   Wound Surface Area (cm^2) 0.6 cm^2 08/05/22 1008   Care Cleansed with:;Sterile normal saline 08/05/22 1008   Dressing Foam;Calcium alginate;Applied 08/05/22 1008   Dressing Change Due 08/08/22 08/05/22 1008            Wound 08/05/22 0930  Left distal;lateral Thigh (Active)   08/05/22 0930     Pre-existing:    Primary Wound Type:    Side: Left   Orientation: distal;lateral   Location: Thigh   Wound Number:    Ankle-Brachial Index:    Pulses:    Removal Indication and Assessment:    Wound Outcome:    (Retired) Wound Type:    (Retired) Wound Length (cm):    (Retired) Wound Width (cm):    (Retired) Depth (cm):    Wound Description (Comments):    Removal Indications:    Wound Image   08/05/22 0929   Dressing Appearance Moist drainage 08/05/22 1008   Drainage Amount Moderate 08/05/22 1008   Drainage Characteristics/Odor Serosanguineous 08/05/22 1008   Appearance Red 08/05/22 1008   Tissue loss description Full thickness 08/05/22 1008   Red (%), Wound Tissue Color 100 % 08/05/22 1008   Periwound Area Intact;Dry 08/05/22 1008   Wound Edges Open;Defined 08/05/22 1008   Wound Length (cm) 0.5 cm 08/05/22 1008   Wound Width (cm) 1.2 cm 08/05/22 1008   Wound Depth (cm) 1.5 cm 08/05/22 1008   Wound Volume (cm^3) 0.9 cm^3 08/05/22 1008   Wound Surface Area (cm^2) 0.6 cm^2 08/05/22 1008   Care Cleansed with:;Sterile normal saline 08/05/22 1008   Dressing Foam 08/05/22 1008   Packing packed with;packing removed;hydrofiber/alginate 08/05/22 1008   Dressing Change Due 08/08/22 08/05/22 1008            Wound 08/05/22 0949  Left distal;medial Thigh (Active)   08/05/22 0949    Pre-existing:    Primary Wound Type:    Side: Left   Orientation: distal;medial   Location: Thigh   Wound Number:    Ankle-Brachial Index:    Pulses:    Removal Indication and Assessment:    Wound Outcome:    (Retired) Wound Type:    (Retired) Wound Length (cm):    (Retired) Wound Width (cm):    (Retired) Depth (cm):    Wound Description (Comments):    Removal Indications:    Dressing Appearance Moist drainage 08/05/22 1008   Drainage Characteristics/Odor Serosanguineous 08/05/22 1008   Appearance Red 08/05/22 1008   Tissue loss description Full thickness 08/05/22 1008   Red (%), Wound Tissue Color 100 % 08/05/22 1008   Periwound Area Dry;Intact  08/05/22 1008   Wound Length (cm) 1.2 cm 08/05/22 1008   Wound Width (cm) 0.8 cm 08/05/22 1008   Wound Depth (cm) 2 cm 08/05/22 1008   Wound Volume (cm^3) 1.92 cm^3 08/05/22 1008   Wound Surface Area (cm^2) 0.96 cm^2 08/05/22 1008   Care Cleansed with:;Soap and water;Sterile normal saline 08/05/22 1008   Dressing Foam 08/05/22 1008   Packing packed with;hydrofiber/alginate 08/05/22 1008   Dressing Change Due 08/08/22 08/05/22 1008            Wound 08/05/22 0910 Other (comment) Left lateral Hip (Active)   08/05/22 0910    Pre-existing:    Primary Wound Type: Other   Side: Left   Orientation: lateral   Location: Hip   Wound Number:    Ankle-Brachial Index:    Pulses:    Removal Indication and Assessment:    Wound Outcome:    (Retired) Wound Type:    (Retired) Wound Length (cm):    (Retired) Wound Width (cm):    (Retired) Depth (cm):    Wound Description (Comments):    Removal Indications:    Wound Image   08/05/22 1008   Dressing Appearance Saturated 08/05/22 1008   Drainage Amount Large 08/05/22 1008   Drainage Characteristics/Odor Serous 08/05/22 1008   Appearance Moist 08/05/22 1008   Tissue loss description Full thickness 08/05/22 1008   Red (%), Wound Tissue Color 50 % 08/05/22 1008   Yellow (%), Wound Tissue Color 50 % 08/05/22 1008   Periwound Area Intact;Dry 08/05/22 1008   Wound Edges Defined;Open 08/05/22 1008   Wound Length (cm) 0.5 cm 08/05/22 1008   Wound Width (cm) 0.8 cm 08/05/22 1008   Wound Depth (cm) 1.8 cm 08/05/22 1008   Wound Volume (cm^3) 0.72 cm^3 08/05/22 1008   Wound Surface Area (cm^2) 0.4 cm^2 08/05/22 1008   Undermining (depth (cm)/location) 7-11=5.0cm 08/05/22 1008   Care Sterile normal saline 08/05/22 1008   Dressing Foam 08/05/22 1008   Packing packed with;hydrofiber/alginate 08/05/22 1008   Dressing Change Due 08/08/22 08/05/22 1008         Assessment:         ICD-10-CM ICD-9-CM   1. Non-healing amputation site  T87.89 997.69   2. Incisional abscess  T81.49XA 998.59   3. S/P AKA (above  knee amputation) unilateral, left  Z89.612 V49.76   4. 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  M86.452 730.15         Plan:   Tissue pathology and/or culture taken:  [x] Yes [] No   Sharp debridement performed:   [] Yes [x] No   Labs ordered this visit:   [] Yes [x] No   Imaging ordered this visit:   [] Yes [x] No     Will need close ID follow up and local wound care.      Orders Placed This Encounter   Procedures    Aerobic culture          Anaerobic culture          Aerobic culture          Anaerobic culture          Change dressing     Left AKA site +left hip wounds     Cleanse wounds with: NS  Lidocaine:prn  Silver nitrate:prn    Primary dressing: pack wounds with Aquacel AG rope  Secondary dressing: bordered foam dressing    Frequency: 3 x week  Follow-up: 2 weeks with Dr Conroy and ID  Home Health: [facility], please perform skilled nursing care with orders above  Other Orders:  left AKA and left hip cultures -done 8/5/22        Follow up in about 2 weeks (around 8/19/2022) for DR Conroy.

## 2022-08-09 LAB
BACTERIA SPEC AEROBE CULT: ABNORMAL

## 2022-08-10 LAB
BACTERIA SPEC ANAEROBE CULT: ABNORMAL
BACTERIA SPEC ANAEROBE CULT: NORMAL

## 2022-08-13 NOTE — ASSESSMENT & PLAN NOTE
-History of CHF per chart review, no ECHO available  -ECHO, CXR   -resume home digoxin, digoxin level 0.6   phlebotomy unable to draw labs this morning

## 2022-08-19 ENCOUNTER — HOSPITAL ENCOUNTER (OUTPATIENT)
Dept: WOUND CARE | Facility: HOSPITAL | Age: 72
Discharge: HOME | End: 2022-08-19
Attending: PREVENTIVE MEDICINE
Payer: MEDICARE

## 2022-08-19 DIAGNOSIS — L89.223 PRESSURE INJURY OF LEFT THIGH, STAGE 3: ICD-10-CM

## 2022-08-19 DIAGNOSIS — T87.89 NON-HEALING AMPUTATION SITE: Primary | ICD-10-CM

## 2022-08-19 DIAGNOSIS — Z89.612 S/P AKA (ABOVE KNEE AMPUTATION) UNILATERAL, LEFT: ICD-10-CM

## 2022-08-19 DIAGNOSIS — T81.49XA INCISIONAL ABSCESS: ICD-10-CM

## 2022-08-19 DIAGNOSIS — M86.452 CHRONIC OSTEOMYELITIS OF LEFT FEMUR WITH DRAINING SINUS: ICD-10-CM

## 2022-08-19 PROCEDURE — 99215 OFFICE O/P EST HI 40 MIN: CPT

## 2022-08-19 RX ORDER — AMOXICILLIN AND CLAVULANATE POTASSIUM 875; 125 MG/1; MG/1
1 TABLET, FILM COATED ORAL EVERY 12 HOURS
Qty: 20 TABLET | Refills: 0 | Status: SHIPPED | OUTPATIENT
Start: 2022-08-19 | End: 2022-08-29

## 2022-08-19 RX ORDER — LEVOFLOXACIN 750 MG/1
750 TABLET ORAL DAILY
Qty: 10 TABLET | Refills: 0 | Status: SHIPPED | OUTPATIENT
Start: 2022-08-19 | End: 2022-08-29

## 2022-08-19 NOTE — PROGRESS NOTES
[facility]    Subjective:       Patient ID: Raleigh Blair is a 71 y.o. male.    Chief Complaint: Wound Care    Patient presents for follow up regarding his left leg wounds. As per his wife there as been no increase in drainage since the last visit, but the wounds are still draining. No other acute complaints today.    Review of Systems   All other systems reviewed and are negative.        Objective:        Physical Exam       Altered Skin Integrity 08/05/22 0920 Left lateral Hip (Active)   08/05/22 0920   Altered Skin Integrity Present on Admission:    Side: Left   Orientation: lateral   Location: Hip   Wound Number:    Is this injury device related?:    Primary Wound Type:    Description of Altered Skin Integrity:    Removal Indication and Assessment:    Wound Outcome:    (Retired) Wound Length (cm):    (Retired) Wound Width (cm):    (Retired) Depth (cm):    Wound Description (Comments):    Removal Indications:    Wound Image   08/19/22 1203   Dressing Appearance Intact;Moist drainage 08/19/22 1203   Drainage Amount Moderate 08/19/22 1203   Drainage Characteristics/Odor Yellow 08/19/22 1203   Appearance Red;Moist 08/19/22 1203   Red (%), Wound Tissue Color 100 % 08/19/22 1203   Wound Edges Open 08/19/22 1203   Wound Length (cm) 0.4 cm 08/19/22 1203   Wound Width (cm) 0.1 cm 08/19/22 1203   Wound Depth (cm) 0.1 cm 08/19/22 1203   Wound Volume (cm^3) 0.004 cm^3 08/19/22 1203   Wound Surface Area (cm^2) 0.04 cm^2 08/19/22 1203   Care Cleansed with:;Soap and water;Sterile normal saline 08/19/22 1203   Dressing Applied;Calcium alginate;Silver;Foam;Island/border 08/19/22 1203   Packing packed with;hydrofiber/alginate 08/19/22 1203   Packing Inserted  1 08/19/22 1203   Packing Removed 1 08/19/22 1203   Periwound Care Dry periwound area maintained 08/19/22 1203   Dressing Change Due 08/22/22 08/19/22 1203            Wound 08/05/22 0930  Left distal;lateral Thigh (Active)    08/05/22 0930    Pre-existing:    Primary Wound Type:    Side: Left   Orientation: distal;lateral   Location: Thigh   Wound Number:    Ankle-Brachial Index:    Pulses:    Removal Indication and Assessment:    Wound Outcome:    (Retired) Wound Type:    (Retired) Wound Length (cm):    (Retired) Wound Width (cm):    (Retired) Depth (cm):    Wound Description (Comments):    Removal Indications:    Wound Image   08/19/22 1100   Dressing Appearance Moist drainage 08/19/22 1100   Drainage Amount Moderate 08/19/22 1100   Drainage Characteristics/Odor Serosanguineous 08/19/22 1100   Appearance Red;Moist 08/19/22 1100   Tissue loss description Full thickness 08/19/22 1100   Red (%), Wound Tissue Color 100 % 08/19/22 1100   Periwound Area Intact;Dry 08/19/22 1100   Wound Edges Open;Defined 08/19/22 1100   Wound Length (cm) 3.5 cm 08/19/22 1100   Wound Width (cm) 0.6 cm 08/19/22 1100   Wound Depth (cm) 1.2 cm 08/19/22 1100   Wound Volume (cm^3) 2.52 cm^3 08/19/22 1100   Wound Surface Area (cm^2) 2.1 cm^2 08/19/22 1100   Care Cleansed with:;Soap and water;Sterile normal saline 08/19/22 1100   Dressing Applied;Silver;Calcium alginate;Foam;Island/border 08/19/22 1100   Packing packed with;hydrofiber/alginate 08/19/22 1100   Periwound Care Dry periwound area maintained 08/19/22 1100   Dressing Change Due 08/22/22 08/19/22 1100            Wound 08/05/22 0910 Other (comment) Left lateral Hip (Active)   08/05/22 0910    Pre-existing:    Primary Wound Type: Other   Side: Left   Orientation: lateral   Location: Hip   Wound Number:    Ankle-Brachial Index:    Pulses:    Removal Indication and Assessment:    Wound Outcome:    (Retired) Wound Type:    (Retired) Wound Length (cm):    (Retired) Wound Width (cm):    (Retired) Depth (cm):    Wound Description (Comments):    Removal Indications:    Wound Image   08/19/22 1205   Dressing Appearance Intact;Moist drainage 08/19/22 1205   Drainage Amount Moderate 08/19/22 1205   Drainage  Characteristics/Odor Yellow 08/19/22 1205   Appearance Red;Moist 08/19/22 1205   Red (%), Wound Tissue Color 100 % 08/19/22 1205   Periwound Area Intact;Dry 08/19/22 1205   Wound Edges Open 08/19/22 1205   Wound Length (cm) 1.2 cm 08/19/22 1205   Wound Width (cm) 1.3 cm 08/19/22 1205   Wound Depth (cm) 0.7 cm 08/19/22 1205   Wound Volume (cm^3) 1.092 cm^3 08/19/22 1205   Wound Surface Area (cm^2) 1.56 cm^2 08/19/22 1205   Tunneling (depth (cm)/location) 1.2cm 12oclock 08/19/22 1205   Care Cleansed with:;Soap and water;Sterile normal saline 08/19/22 1205   Dressing Applied;Calcium alginate;Silver;Foam;Island/border 08/19/22 1205   Packing packing removed;hydrofiber/alginate;packed with 08/19/22 1205   Packing Inserted  1 08/19/22 1205   Packing Removed 1 08/19/22 1205   Periwound Care Dry periwound area maintained 08/19/22 1205   Dressing Change Due 08/22/22 08/19/22 1205         Assessment:         ICD-10-CM ICD-9-CM   1. Non-healing amputation site  T87.89 997.69   2. Pressure injury of left thigh, stage 3  L89.223 707.09     707.23   3. Incisional abscess  T81.49XA 998.59   4. S/P AKA (above knee amputation) unilateral, left  Z89.612 V49.76   5. 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 Will continue same dressings at this time. Also seen by ID during this visit.  Plan:   Tissue pathology and/or culture taken:  [] Yes [x] No   Sharp debridement performed:   [] Yes [x] No   Labs ordered this visit:   [] Yes [x] No   Imaging ordered this visit:   [] Yes [x] No     .      Orders Placed This Encounter   Procedures    Change dressing     Left AKA site +left hip wounds     Cleanse wounds with: NS   Lidocaine:prn   Silver nitrate:prn     Primary dressing: pack wounds with Aquacel AG rope   Secondary dressing: bordered foam dressing     Frequency: 3 x week   Follow-up: 2 weeks with Dr Conroy and ID 9/2/22  Home Health: [facility],  please perform skilled nursing care with orders above   Other Orders:  Pt starting Levaquin 750mg daily and Augmentin BID        Follow up in about 2 weeks (around 9/2/2022) for Dr Conroy.

## 2022-09-02 ENCOUNTER — HOSPITAL ENCOUNTER (OUTPATIENT)
Dept: WOUND CARE | Facility: HOSPITAL | Age: 72
Discharge: HOME | End: 2022-09-02
Attending: PREVENTIVE MEDICINE
Payer: MEDICARE

## 2022-09-02 ENCOUNTER — CLINICAL SUPPORT (OUTPATIENT)
Dept: INFECTIOUS DISEASES | Facility: CLINIC | Age: 72
End: 2022-09-02
Payer: MEDICARE

## 2022-09-02 VITALS
HEIGHT: 63 IN | DIASTOLIC BLOOD PRESSURE: 55 MMHG | WEIGHT: 166 LBS | TEMPERATURE: 98 F | HEART RATE: 89 BPM | SYSTOLIC BLOOD PRESSURE: 120 MMHG | BODY MASS INDEX: 29.41 KG/M2

## 2022-09-02 DIAGNOSIS — T87.89 NON-HEALING AMPUTATION SITE: ICD-10-CM

## 2022-09-02 DIAGNOSIS — L89.223 PRESSURE INJURY OF LEFT THIGH, STAGE 3: ICD-10-CM

## 2022-09-02 DIAGNOSIS — Z89.612 S/P AKA (ABOVE KNEE AMPUTATION) UNILATERAL, LEFT: ICD-10-CM

## 2022-09-02 DIAGNOSIS — Z16.13 INFECTION DUE TO CARBAPENEM RESISTANT PSEUDOMONAS AERUGINOSA: ICD-10-CM

## 2022-09-02 DIAGNOSIS — T81.49XA INCISIONAL ABSCESS: ICD-10-CM

## 2022-09-02 DIAGNOSIS — Z89.612 S/P AKA (ABOVE KNEE AMPUTATION) UNILATERAL, LEFT: Primary | ICD-10-CM

## 2022-09-02 DIAGNOSIS — M86.452 CHRONIC OSTEOMYELITIS OF LEFT FEMUR WITH DRAINING SINUS: Primary | ICD-10-CM

## 2022-09-02 DIAGNOSIS — M86.452 CHRONIC OSTEOMYELITIS OF LEFT FEMUR WITH DRAINING SINUS: ICD-10-CM

## 2022-09-02 DIAGNOSIS — A49.8 PROTEUS MIRABILIS INFECTION: ICD-10-CM

## 2022-09-02 DIAGNOSIS — A49.8 INFECTION DUE TO CARBAPENEM RESISTANT PSEUDOMONAS AERUGINOSA: ICD-10-CM

## 2022-09-02 PROCEDURE — 99213 OFFICE O/P EST LOW 20 MIN: CPT | Mod: S$GLB,,, | Performed by: INTERNAL MEDICINE

## 2022-09-02 PROCEDURE — 99214 OFFICE O/P EST MOD 30 MIN: CPT

## 2022-09-02 PROCEDURE — 99213 PR OFFICE/OUTPT VISIT, EST, LEVL III, 20-29 MIN: ICD-10-PCS | Mod: S$GLB,,, | Performed by: INTERNAL MEDICINE

## 2022-09-02 RX ORDER — AMOXICILLIN AND CLAVULANATE POTASSIUM 875; 125 MG/1; MG/1
1 TABLET, FILM COATED ORAL 2 TIMES DAILY
Qty: 60 TABLET | Refills: 5 | Status: SHIPPED | OUTPATIENT
Start: 2022-09-02

## 2022-09-02 RX ORDER — LEVOFLOXACIN 750 MG/1
750 TABLET ORAL DAILY
Qty: 30 TABLET | Refills: 5 | Status: SHIPPED | OUTPATIENT
Start: 2022-09-02

## 2022-09-02 NOTE — PROGRESS NOTES
[facility]    Subjective:       Patient ID: Raleigh Blair is a 71 y.o. male.    Chief Complaint: Non-healing Wound (Left AKA)    F/U for wounds to the left AKA with underlying osteomyelitis s/p removal of hardware. As per Wife wounds are improving. No reports of increased drainage.  Review of Systems   All other systems reviewed and are negative.      Objective:      Physical Exam       Wound 08/05/22 0930  Left distal;lateral Thigh (Active)   08/05/22 0930    Pre-existing:    Primary Wound Type:    Side: Left   Orientation: distal;lateral   Location: Thigh   Wound Number:    Ankle-Brachial Index:    Pulses:    Removal Indication and Assessment:    Wound Outcome:    (Retired) Wound Type:    (Retired) Wound Length (cm):    (Retired) Wound Width (cm):    (Retired) Depth (cm):    Wound Description (Comments):    Removal Indications:    Dressing Appearance Moist drainage 09/02/22 1000   Drainage Amount Moderate 09/02/22 1000   Drainage Characteristics/Odor Serosanguineous 09/02/22 1000   Appearance Red;Moist 09/02/22 1000   Tissue loss description Full thickness 09/02/22 1000   Red (%), Wound Tissue Color 100 % 09/02/22 1000   Periwound Area Dry 09/02/22 1000   Wound Edges Open 09/02/22 1000   Wound Length (cm) 0.4 cm 09/02/22 1000   Wound Width (cm) 1 cm 09/02/22 1000   Wound Depth (cm) 1.5 cm 09/02/22 1000   Wound Volume (cm^3) 0.6 cm^3 09/02/22 1000   Wound Surface Area (cm^2) 0.4 cm^2 09/02/22 1000   Care Cleansed with:;Sterile normal saline 09/02/22 1000   Dressing Applied;Calcium alginate;Silver;Island/border 09/02/22 1000   Packing packed with;other (see comment) 09/02/22 1000   Packing Inserted  1 09/02/22 1000   Packing Removed 1 09/02/22 1000   Periwound Care Skin barrier film applied 09/02/22 1000   Dressing Change Due 09/05/22 09/02/22 1000            Wound 08/05/22 0949  Left distal;medial Thigh (Active)   08/05/22 0949    Pre-existing:    Primary Wound  Type:    Side: Left   Orientation: distal;medial   Location: Thigh   Wound Number:    Ankle-Brachial Index:    Pulses:    Removal Indication and Assessment:    Wound Outcome:    (Retired) Wound Type:    (Retired) Wound Length (cm):    (Retired) Wound Width (cm):    (Retired) Depth (cm):    Wound Description (Comments):    Removal Indications:    Wound Image   09/02/22 1000   Dressing Appearance Moist drainage 09/02/22 1000   Drainage Amount Moderate 09/02/22 1000   Drainage Characteristics/Odor Serosanguineous 09/02/22 1000   Appearance Red;Moist 09/02/22 1000   Tissue loss description Full thickness 09/02/22 1000   Red (%), Wound Tissue Color 100 % 09/02/22 1000   Periwound Area Dry 09/02/22 1000   Wound Edges Open 09/02/22 1000   Wound Length (cm) 0.4 cm 09/02/22 1000   Wound Width (cm) 1 cm 09/02/22 1000   Wound Depth (cm) 1.5 cm 09/02/22 1000   Wound Volume (cm^3) 0.6 cm^3 09/02/22 1000   Wound Surface Area (cm^2) 0.4 cm^2 09/02/22 1000   Care Cleansed with:;Sterile normal saline 09/02/22 1000   Dressing Applied;Calcium alginate;Silver;Island/border 09/02/22 1000   Packing packed with 09/02/22 1000   Packing Inserted  1 09/02/22 1000   Packing Removed 1 09/02/22 1000   Periwound Care Skin barrier film applied 09/02/22 1000   Dressing Change Due 09/05/22 09/02/22 1000            Wound 08/05/22 0910 Other (comment) Left lateral Hip (Active)   08/05/22 0910    Pre-existing:    Primary Wound Type: Other   Side: Left   Orientation: lateral   Location: Hip   Wound Number:    Ankle-Brachial Index:    Pulses:    Removal Indication and Assessment:    Wound Outcome:    (Retired) Wound Type:    (Retired) Wound Length (cm):    (Retired) Wound Width (cm):    (Retired) Depth (cm):    Wound Description (Comments):    Removal Indications:    Wound Image   09/02/22 1000   Dressing Appearance Moist drainage 09/02/22 1000   Drainage Amount Moderate 09/02/22 1000   Drainage Characteristics/Odor Serosanguineous 09/02/22 1000    Appearance Red;Moist 09/02/22 1000   Tissue loss description Full thickness 09/02/22 1000   Red (%), Wound Tissue Color 100 % 09/02/22 1000   Periwound Area Dry 09/02/22 1000   Wound Edges Open 09/02/22 1000   Wound Length (cm) 0.5 cm 09/02/22 1000   Wound Width (cm) 0.5 cm 09/02/22 1000   Wound Depth (cm) 1 cm 09/02/22 1000   Wound Volume (cm^3) 0.25 cm^3 09/02/22 1000   Wound Surface Area (cm^2) 0.25 cm^2 09/02/22 1000   Care Cleansed with:;Sterile normal saline 09/02/22 1000   Dressing Applied;Calcium alginate;Silver;Island/border 09/02/22 1000   Periwound Care Skin barrier film applied 09/02/22 1000   Dressing Change Due 09/05/22 09/02/22 1000         Assessment:         ICD-10-CM ICD-9-CM   1. 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 Non-healing amputation site  T87.89 997.69   3. Pressure injury of left thigh, stage 3  L89.223 707.09     707.23   4. Incisional abscess  T81.49XA 998.59   5. S/P AKA (above knee amputation) unilateral, left  Z89.612 V49.76       Wounds are improving. No gross signs of infection.  Continue oral antibiotics as per Dr. Conn      Plan:   Tissue pathology and/or culture taken:  [] Yes [x] No   Sharp debridement performed:   [] Yes [x] No   Labs ordered this visit:   [] Yes [x] No   Imaging ordered this visit:   [] Yes [x] No           Orders Placed This Encounter   Procedures    Change dressing     Left AKA site +left hip wounds     Cleanse wounds with: NS   Lidocaine:prn   Silver nitrate:prn     Primary dressing: pack wounds with Aquacel AG rope   Secondary dressing: bordered foam dressing     Frequency: 3 x week   Follow-up: 2 weeks with Dr Conroy and ID 9/16/22  Home Health: [facility], please perform skilled nursing care with orders above   Other Orders:  Continue Levaquin 750mg daily and Augmentin BID        Follow up in about 2 weeks (around 9/16/2022).

## 2022-09-02 NOTE — PROGRESS NOTES
Patient returns. Last saw patient on 8/19 - taking Levaquin and augmentin. Patient ran out of medications today. Has not missed doses.  Left AKA stump - wounds look clean, no periwound erythema. No bone palpated in both stump wounds or in left lateral wound. All wounds without drainage. All probe to 1.5 cm in depth.         1. S/P AKA (above knee amputation) unilateral, left    2. Pressure injury of left thigh, stage 3    3. Non-healing amputation site    4. 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 Proteus mirabilis infection    6. 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 Continue wound care, pressure off loading, and nutritional support. Follow up in 2 weeks

## 2022-09-16 ENCOUNTER — HOSPITAL ENCOUNTER (OUTPATIENT)
Dept: WOUND CARE | Facility: HOSPITAL | Age: 72
Discharge: HOME | End: 2022-09-16
Attending: PREVENTIVE MEDICINE
Payer: MEDICARE

## 2022-09-16 VITALS
HEART RATE: 94 BPM | BODY MASS INDEX: 29.41 KG/M2 | TEMPERATURE: 99 F | HEIGHT: 63 IN | SYSTOLIC BLOOD PRESSURE: 123 MMHG | DIASTOLIC BLOOD PRESSURE: 59 MMHG | WEIGHT: 166 LBS

## 2022-09-16 DIAGNOSIS — M86.452 CHRONIC OSTEOMYELITIS OF LEFT FEMUR WITH DRAINING SINUS: Primary | ICD-10-CM

## 2022-09-16 DIAGNOSIS — L89.223 PRESSURE INJURY OF LEFT THIGH, STAGE 3: ICD-10-CM

## 2022-09-16 DIAGNOSIS — T87.89 NON-HEALING AMPUTATION SITE: ICD-10-CM

## 2022-09-16 PROCEDURE — 11043 DBRDMT MUSC&/FSCA 1ST 20/<: CPT

## 2022-09-16 PROCEDURE — 11042 DBRDMT SUBQ TIS 1ST 20SQCM/<: CPT

## 2022-09-16 NOTE — PROGRESS NOTES
[facility]    Subjective:       Patient ID: Raleigh Blair is a 71 y.o. male.    Chief Complaint: Wound Care    Follow up appt. Wounds improving slowly. Continues to take antibiotics as prescribed.  No complaints or request. No changes in plan of care and instructed patient and care giver. Understanding voiced. Patient is waiting to see ID. F/U in 3 weeks.  Review of Systems   All other systems reviewed and are negative.      Objective:      Physical Exam       Wound 08/05/22 0930  Left distal;lateral Thigh (Active)   08/05/22 0930    Pre-existing:    Primary Wound Type:    Side: Left   Orientation: distal;lateral   Location: Thigh   Wound Number:    Ankle-Brachial Index:    Pulses:    Removal Indication and Assessment:    Wound Outcome:    (Retired) Wound Type:    (Retired) Wound Length (cm):    (Retired) Wound Width (cm):    (Retired) Depth (cm):    Wound Description (Comments):    Removal Indications:    Wound Image   09/16/22 1100   Dressing Appearance Moist drainage 09/16/22 1100   Drainage Amount Moderate 09/16/22 1100   Drainage Characteristics/Odor Serosanguineous 09/16/22 1100   Appearance Red;Moist 09/16/22 1100   Tissue loss description Full thickness 09/16/22 1100   Red (%), Wound Tissue Color 100 % 09/16/22 1100   Periwound Area Dry 09/16/22 1100   Wound Edges Open 09/16/22 1100   Wound Length (cm) 0.4 cm 09/16/22 1100   Wound Width (cm) 1 cm 09/16/22 1100   Wound Depth (cm) 1.2 cm 09/16/22 1100   Wound Volume (cm^3) 0.48 cm^3 09/16/22 1100   Wound Surface Area (cm^2) 0.4 cm^2 09/16/22 1100   Care Cleansed with:;Sterile normal saline 09/16/22 1100   Dressing Applied;Calcium alginate;Silver;Island/border 09/16/22 1100   Periwound Care Skin barrier film applied 09/16/22 1100   Dressing Change Due 09/19/22 09/16/22 1100            Wound 08/05/22 0949  Left distal;medial Thigh (Active)   08/05/22 0949    Pre-existing:    Primary Wound Type:    Side: Left    Orientation: distal;medial   Location: Thigh   Wound Number:    Ankle-Brachial Index:    Pulses:    Removal Indication and Assessment:    Wound Outcome:    (Retired) Wound Type:    (Retired) Wound Length (cm):    (Retired) Wound Width (cm):    (Retired) Depth (cm):    Wound Description (Comments):    Removal Indications:    Dressing Appearance Moist drainage;Intact 09/16/22 1100   Drainage Amount Moderate 09/16/22 1100   Drainage Characteristics/Odor Serosanguineous 09/16/22 1100   Appearance Red;Moist;Yellow 09/16/22 1100   Tissue loss description Full thickness 09/16/22 1100   Red (%), Wound Tissue Color 90 % 09/16/22 1100   Yellow (%), Wound Tissue Color 10 % 09/16/22 1100   Periwound Area Dry 09/16/22 1100   Wound Edges Open 09/16/22 1100   Wound Length (cm) 0.4 cm 09/16/22 1100   Wound Width (cm) 0.8 cm 09/16/22 1100   Wound Depth (cm) 2.3 cm 09/16/22 1100   Wound Volume (cm^3) 0.736 cm^3 09/16/22 1100   Wound Surface Area (cm^2) 0.32 cm^2 09/16/22 1100   Care Cleansed with:;Sterile normal saline 09/16/22 1100   Dressing Applied;Calcium alginate;Silver;Island/border 09/16/22 1100   Packing packed with 09/16/22 1100   Periwound Care Skin barrier film applied 09/16/22 1100   Dressing Change Due 09/19/22 09/16/22 1100            Wound 08/05/22 0910 Other (comment) Left lateral Hip (Active)   08/05/22 0910    Pre-existing:    Primary Wound Type: Other   Side: Left   Orientation: lateral   Location: Hip   Wound Number:    Ankle-Brachial Index:    Pulses:    Removal Indication and Assessment:    Wound Outcome:    (Retired) Wound Type:    (Retired) Wound Length (cm):    (Retired) Wound Width (cm):    (Retired) Depth (cm):    Wound Description (Comments):    Removal Indications:    Wound Image   09/16/22 1100   Dressing Appearance Intact;Moist drainage 09/16/22 1100   Drainage Amount Moderate 09/16/22 1100   Drainage Characteristics/Odor Serosanguineous 09/16/22 1100   Appearance Red;Moist 09/16/22 1100   Tissue loss  description Full thickness 09/16/22 1100   Red (%), Wound Tissue Color 100 % 09/16/22 1100   Periwound Area Dry;Intact 09/16/22 1100   Wound Edges Open 09/16/22 1100   Wound Length (cm) 0.5 cm 09/16/22 1100   Wound Width (cm) 0.7 cm 09/16/22 1100   Wound Depth (cm) 1 cm 09/16/22 1100   Wound Volume (cm^3) 0.35 cm^3 09/16/22 1100   Wound Surface Area (cm^2) 0.35 cm^2 09/16/22 1100   Tunneling (depth (cm)/location) 2.2cm @ 12 09/16/22 1100   Care Cleansed with:;Sterile normal saline 09/16/22 1100   Dressing Applied;Calcium alginate;Silver;Island/border 09/16/22 1100   Packing packed with 09/16/22 1100   Periwound Care Skin barrier film applied 09/16/22 1100   Dressing Change Due 09/19/22 09/16/22 1100         Assessment:         ICD-10-CM ICD-9-CM   1. 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 Pressure injury of left thigh, stage 3  L89.223 707.09     707.23   3. Non-healing amputation site  T87.89 997.69         Plan:   Tissue pathology and/or culture taken:  [] Yes [x] No   Sharp debridement performed:   [x] Yes [] No   Labs ordered this visit:   [] Yes [x] No   Imaging ordered this visit:   [] Yes [x] No           Orders Placed This Encounter   Procedures    Change dressing     Left AKA site +left hip wounds     Cleanse wounds with: NS   Lidocaine:prn   Silver nitrate:prn     Primary dressing: pack wounds with Aquacel AG rope   Secondary dressing: bordered foam dressing     Frequency: 3 x week   Follow-up: 3 weeks with Dr Conroy and ID   Home Health: [facility], please perform skilled nursing care with orders above   Other Orders:  Continue Levaquin 750mg daily and Augmentin BID        Follow up in about 3 weeks (around 10/7/2022).

## 2022-09-16 NOTE — PROCEDURES
"Debridement    Date/Time: 9/16/2022 9:50 AM  Performed by: Boyd Conroy MD  Authorized by: Boyd Conroy MD     Time out: Immediately prior to procedure a "time out" was called to verify the correct patient, procedure, equipment, support staff and site/side marked as required.    Consent Done?:  Yes (Written)  Local anesthesia used?: Yes    Local anesthetic:  Topical anesthetic    Wound Details:    Location:  Left leg    Type of Debridement:  Excisional       Length (cm):  0.4       Area (sq cm):  0.4       Width (cm):  1       Percent Debrided (%):  100       Depth (cm):  1.2       Total Area Debrided (sq cm):  0.4    Depth of debridement:  Muscle/fascia/tendon    Tissue debrided:  Adipose and Muscle    Devitalized tissue debrided:  Slough, Fibrin and Exudate    Instruments:  Curette, Nippers and Scissors    Bleeding:  Minimal  Hemostasis Achieved: Yes    Method Used:  Pressure  Patient tolerance:  Patient tolerated the procedure well with no immediate complications  "

## 2022-10-05 ENCOUNTER — EXTERNAL HOME HEALTH (OUTPATIENT)
Dept: HOME HEALTH SERVICES | Facility: HOSPITAL | Age: 72
End: 2022-10-05
Payer: MEDICARE

## 2022-10-14 ENCOUNTER — HOSPITAL ENCOUNTER (OUTPATIENT)
Dept: WOUND CARE | Facility: HOSPITAL | Age: 72
Discharge: HOME | End: 2022-10-14
Attending: PREVENTIVE MEDICINE
Payer: MEDICARE

## 2022-10-14 ENCOUNTER — CLINICAL SUPPORT (OUTPATIENT)
Dept: INFECTIOUS DISEASES | Facility: CLINIC | Age: 72
End: 2022-10-14
Payer: MEDICARE

## 2022-10-14 VITALS
WEIGHT: 166 LBS | HEIGHT: 63 IN | SYSTOLIC BLOOD PRESSURE: 126 MMHG | BODY MASS INDEX: 29.41 KG/M2 | HEART RATE: 109 BPM | TEMPERATURE: 99 F | DIASTOLIC BLOOD PRESSURE: 69 MMHG

## 2022-10-14 DIAGNOSIS — Z89.612 S/P AKA (ABOVE KNEE AMPUTATION) UNILATERAL, LEFT: ICD-10-CM

## 2022-10-14 DIAGNOSIS — T81.49XA INCISIONAL ABSCESS: ICD-10-CM

## 2022-10-14 DIAGNOSIS — T87.89 NON-HEALING AMPUTATION SITE: ICD-10-CM

## 2022-10-14 DIAGNOSIS — L89.223 PRESSURE INJURY OF LEFT THIGH, STAGE 3: Primary | ICD-10-CM

## 2022-10-14 DIAGNOSIS — L89.223 PRESSURE INJURY OF LEFT THIGH, STAGE 3: ICD-10-CM

## 2022-10-14 DIAGNOSIS — A49.8 INFECTION DUE TO CARBAPENEM RESISTANT PSEUDOMONAS AERUGINOSA: Primary | ICD-10-CM

## 2022-10-14 DIAGNOSIS — M86.452 CHRONIC OSTEOMYELITIS OF LEFT FEMUR WITH DRAINING SINUS: ICD-10-CM

## 2022-10-14 DIAGNOSIS — Z16.13 INFECTION DUE TO CARBAPENEM RESISTANT PSEUDOMONAS AERUGINOSA: Primary | ICD-10-CM

## 2022-10-14 PROCEDURE — 87070 CULTURE OTHR SPECIMN AEROBIC: CPT | Performed by: PREVENTIVE MEDICINE

## 2022-10-14 PROCEDURE — 87186 SC STD MICRODIL/AGAR DIL: CPT | Performed by: PREVENTIVE MEDICINE

## 2022-10-14 PROCEDURE — 87075 CULTR BACTERIA EXCEPT BLOOD: CPT | Performed by: PREVENTIVE MEDICINE

## 2022-10-14 PROCEDURE — 99215 OFFICE O/P EST HI 40 MIN: CPT

## 2022-10-14 PROCEDURE — 87077 CULTURE AEROBIC IDENTIFY: CPT | Performed by: PREVENTIVE MEDICINE

## 2022-10-14 NOTE — PROGRESS NOTES
[facility]    Subjective:       Patient ID: Raleigh Blair is a 71 y.o. male.    Chief Complaint: Wound Care    Follow up related to left AKA site and left lateral hip wounds.  Wounds are stable. On antibiotics per ID  Review of Systems   All other systems reviewed and are negative.      Objective:      Physical Exam       Wound 08/05/22 0930  Left distal;lateral Thigh (Active)   08/05/22 0930    Pre-existing:    Primary Wound Type:    Side: Left   Orientation: distal;lateral   Location: Thigh   Wound Number:    Ankle-Brachial Index:    Pulses:    Removal Indication and Assessment:    Wound Outcome:    (Retired) Wound Type:    (Retired) Wound Length (cm):    (Retired) Wound Width (cm):    (Retired) Depth (cm):    Wound Description (Comments):    Removal Indications:    Wound Image   10/14/22 1000   Dressing Appearance Intact;Moist drainage 10/14/22 1000   Drainage Amount Moderate 10/14/22 1000   Drainage Characteristics/Odor Serosanguineous 10/14/22 1000   Appearance Red;Moist 10/14/22 1000   Tissue loss description Full thickness 10/14/22 1000   Red (%), Wound Tissue Color 100 % 10/14/22 1000   Periwound Area Intact;Dry 10/14/22 1000   Wound Edges Open 10/14/22 1000   Wound Length (cm) 0.5 cm 10/14/22 1000   Wound Width (cm) 0.7 cm 10/14/22 1000   Wound Depth (cm) 2.1 cm 10/14/22 1000   Wound Volume (cm^3) 0.735 cm^3 10/14/22 1000   Wound Surface Area (cm^2) 0.35 cm^2 10/14/22 1000   Care Cleansed with:;Soap and water;Sterile normal saline 10/14/22 1000   Dressing Applied;Silver;Calcium alginate;Foam;Island/border 10/14/22 1000   Packing packing removed;packed with;hydrofiber/alginate 10/14/22 1000   Packing Inserted  1 10/14/22 1000   Packing Removed 1 10/14/22 1000   Periwound Care Dry periwound area maintained 10/14/22 1000   Dressing Change Due 10/17/22 10/14/22 1000            Wound 08/05/22 0949  Left distal;medial Thigh (Active)   08/05/22 0949     Pre-existing:    Primary Wound Type:    Side: Left   Orientation: distal;medial   Location: Thigh   Wound Number:    Ankle-Brachial Index:    Pulses:    Removal Indication and Assessment:    Wound Outcome:    (Retired) Wound Type:    (Retired) Wound Length (cm):    (Retired) Wound Width (cm):    (Retired) Depth (cm):    Wound Description (Comments):    Removal Indications:    Wound Image   10/14/22 1000   Dressing Appearance Intact;Moist drainage 10/14/22 1000   Drainage Amount Moderate 10/14/22 1000   Drainage Characteristics/Odor Serosanguineous 10/14/22 1000   Appearance Red;Moist 10/14/22 1000   Tissue loss description Full thickness 10/14/22 1000   Red (%), Wound Tissue Color 100 % 10/14/22 1000   Periwound Area Dry;Intact 10/14/22 1000   Wound Edges Open 10/14/22 1000   Wound Length (cm) 1 cm 10/14/22 1000   Wound Width (cm) 0.7 cm 10/14/22 1000   Wound Depth (cm) 2.3 cm 10/14/22 1000   Wound Volume (cm^3) 1.61 cm^3 10/14/22 1000   Wound Surface Area (cm^2) 0.7 cm^2 10/14/22 1000   Care Cleansed with:;Soap and water;Sterile normal saline 10/14/22 1000   Dressing Applied;Silver;Calcium alginate;Foam;Island/border 10/14/22 1000   Packing packed with;hydrofiber/alginate;packing removed 10/14/22 1000   Packing Inserted  1 10/14/22 1000   Packing Removed 1 10/14/22 1000   Periwound Care Dry periwound area maintained 10/14/22 1000   Dressing Change Due 10/17/22 10/14/22 1000            Wound 08/05/22 0910 Other (comment) Left lateral Hip (Active)   08/05/22 0910    Pre-existing:    Primary Wound Type: Other   Side: Left   Orientation: lateral   Location: Hip   Wound Number:    Ankle-Brachial Index:    Pulses:    Removal Indication and Assessment:    Wound Outcome:    (Retired) Wound Type:    (Retired) Wound Length (cm):    (Retired) Wound Width (cm):    (Retired) Depth (cm):    Wound Description (Comments):    Removal Indications:    Wound Image   10/14/22 1000   Dressing Appearance Intact;Moist drainage 10/14/22  1000   Drainage Amount Moderate 10/14/22 1000   Drainage Characteristics/Odor Serosanguineous 10/14/22 1000   Appearance Red;Moist 10/14/22 1000   Red (%), Wound Tissue Color 100 % 10/14/22 1000   Periwound Area Intact;Dry 10/14/22 1000   Wound Edges Open 10/14/22 1000   Wound Length (cm) 0.7 cm 10/14/22 1000   Wound Width (cm) 0.7 cm 10/14/22 1000   Wound Depth (cm) 0.8 cm 10/14/22 1000   Wound Volume (cm^3) 0.392 cm^3 10/14/22 1000   Wound Surface Area (cm^2) 0.49 cm^2 10/14/22 1000   Tunneling (depth (cm)/location) 1.4cm@11-12oclock 10/14/22 1000   Care Cleansed with:;Soap and water;Sterile normal saline 10/14/22 1000   Dressing Applied;Silver;Calcium alginate;Island/border;Foam 10/14/22 1000   Packing packing removed;packed with;hydrofiber/alginate 10/14/22 1000   Packing Inserted  1 10/14/22 1000   Packing Removed 1 10/14/22 1000   Periwound Care Dry periwound area maintained 10/14/22 1000   Dressing Change Due 10/17/22 10/14/22 1000         Assessment/Plan:           ICD-10-CM ICD-9-CM   1. Pressure injury of left thigh, stage 3  L89.223 707.09     707.23   2. Non-healing amputation site  T87.89 997.69           Tissue pathology and/or culture taken:  [x] Yes [] No   Sharp debridement performed:   [] Yes [x] No   Labs ordered this visit:   [] Yes [x] No   Imaging ordered this visit:   [] Yes [x] No           Orders Placed This Encounter   Procedures    CULTURE, ANAEROBE          CULTURE, AEROBIC  (SPECIFY SOURCE)          Change dressing     Left AKA site +left hip wounds     Cleanse wounds with: NS   Lidocaine:prn   Silver nitrate:prn     Primary dressing: pack wounds with Aquacel AG rope   Secondary dressing: bordered foam dressing     Frequency: 3 x week   Follow-up: 3 weeks with Dr Conroy and ID   Home Health: [facility], please perform skilled nursing care with orders above   Other Orders:  Continue Levaquin 750mg daily and Augmentin BID    Change dressing     Left AKA site +left hip wounds      Cleanse wounds with: NS   Lidocaine:prn   Silver nitrate:prn     Primary dressing: pack wounds with Aquacel AG rope   Secondary dressing: bordered foam dressing     Frequency: 3 x week   Follow-up: 4 weeks with Dr Conroy and ID   Home Health: [facility], please perform skilled nursing care with orders above   Other Orders:  Continue Levaquin 750mg daily and Augmentin BID        Follow up in 4 weeks (on 11/11/2022) for Dr Conroy.            10/14/2022 Nurse's note.  Follow up with Dr Conroy related to left AKA site and left lateral hip wounds.  Patient disoriented, wants to get up in the chair, non-cooperative, wife at bedside keeping patient calm.  Overall wound improvement noted by MD.  Dr Conn sent refills so the patient can continue taking Levaquin and Augmentin.  Wound culture to left lateral thigh performed.  Continue plan of care.  Orders faxed to [facility].  Follow up with Dr Conroy in 4 weeks on 11/11/2022.

## 2022-10-14 NOTE — PROGRESS NOTES
Doing well. Ran out of antibiotics, but no complaints.    Exam - Left AKA -- thigh wound improved. Left AKA lateral stump wound deeper. Left AKA medial stump wound unchanged.                 1. 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 Pressure injury of left thigh, stage 3    4. S/P AKA (above knee amputation) unilateral, left       Will refill medications today. Culture of lateral wound today. Follow up in 1 month.  I spent over 10 minutes on this encounter today.

## 2022-10-17 LAB — BACTERIA SPEC AEROBE CULT: ABNORMAL

## 2022-10-19 LAB — BACTERIA SPEC ANAEROBE CULT: NORMAL

## 2022-11-11 ENCOUNTER — HOSPITAL ENCOUNTER (OUTPATIENT)
Dept: WOUND CARE | Facility: HOSPITAL | Age: 72
Discharge: HOME | End: 2022-11-11
Attending: PREVENTIVE MEDICINE
Payer: MEDICARE

## 2022-11-11 ENCOUNTER — CLINICAL SUPPORT (OUTPATIENT)
Dept: INFECTIOUS DISEASES | Facility: CLINIC | Age: 72
End: 2022-11-11
Payer: MEDICARE

## 2022-11-11 VITALS
BODY MASS INDEX: 29.41 KG/M2 | HEIGHT: 63 IN | TEMPERATURE: 98 F | WEIGHT: 166 LBS | DIASTOLIC BLOOD PRESSURE: 67 MMHG | HEART RATE: 92 BPM | SYSTOLIC BLOOD PRESSURE: 135 MMHG

## 2022-11-11 DIAGNOSIS — Z89.612 S/P AKA (ABOVE KNEE AMPUTATION) UNILATERAL, LEFT: ICD-10-CM

## 2022-11-11 DIAGNOSIS — T87.89 NON-HEALING AMPUTATION SITE: ICD-10-CM

## 2022-11-11 DIAGNOSIS — A49.8 PROTEUS MIRABILIS INFECTION: ICD-10-CM

## 2022-11-11 DIAGNOSIS — M86.452 CHRONIC OSTEOMYELITIS OF LEFT FEMUR WITH DRAINING SINUS: ICD-10-CM

## 2022-11-11 DIAGNOSIS — T81.49XA INCISIONAL ABSCESS: ICD-10-CM

## 2022-11-11 DIAGNOSIS — L89.223 PRESSURE INJURY OF LEFT THIGH, STAGE 3: Primary | ICD-10-CM

## 2022-11-11 DIAGNOSIS — Z16.13 INFECTION DUE TO CARBAPENEM RESISTANT PSEUDOMONAS AERUGINOSA: Primary | ICD-10-CM

## 2022-11-11 DIAGNOSIS — A49.8 INFECTION DUE TO CARBAPENEM RESISTANT PSEUDOMONAS AERUGINOSA: Primary | ICD-10-CM

## 2022-11-11 PROCEDURE — 99212 PR OFFICE/OUTPT VISIT, EST, LEVL II, 10-19 MIN: ICD-10-PCS | Mod: S$GLB,,, | Performed by: INTERNAL MEDICINE

## 2022-11-11 PROCEDURE — 99212 OFFICE O/P EST SF 10 MIN: CPT | Mod: S$GLB,,, | Performed by: INTERNAL MEDICINE

## 2022-11-11 PROCEDURE — 99215 OFFICE O/P EST HI 40 MIN: CPT

## 2022-11-11 NOTE — PROGRESS NOTES
Patient presents for follow up. Has not been taking any antibiotics for the past month. No fevers.  Culture from 10/14:     Exam:  Left AKA and thigh:  Wound has no periwound erythema. No drainage. Wounds smaller, less tunneling.  No fluctuance.  1. 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 Proteus mirabilis infection    3. 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    4. S/P AKA (above knee amputation) unilateral, left        Recommend:   Will hold antibiotics for now   Continue wound care.

## 2022-11-11 NOTE — PROGRESS NOTES
"                     [facility]    Subjective:       Patient ID: Raleigh Blair is a 71 y.o. male.    Chief Complaint: Wound Care    Follow up related to left aka site wounds and left hip wound.  Patient disoriented at baseline, patient's wife at bedside.  No complaints.  New skin tear to left elbow, patient's wife unsure when patient got the injury but states "they've been putting a white bandage on it for protection since he leans on it all the time".  Review of Systems   All other systems reviewed and are negative.      Objective:      Physical Exam       Altered Skin Integrity 11/11/22 1000 Left posterior Elbow Skin Tear Partial thickness tissue loss. Shallow open ulcer with a red or pink wound bed, without slough. Intact or Open/Ruptured Serum-filled blister. (Active)   11/11/22 1000   Altered Skin Integrity Present on Admission: yes   Side: Left   Orientation: posterior   Location: Elbow   Wound Number:    Is this injury device related?: No   Primary Wound Type: Skin tear   Description of Altered Skin Integrity: Partial thickness tissue loss. Shallow open ulcer with a red or pink wound bed, without slough. Intact or Open/Ruptured Serum-filled blister.   Ankle-Brachial Index:    Pulses:    Removal Indication and Assessment:    Wound Outcome:    (Retired) Wound Length (cm):    (Retired) Wound Width (cm):    (Retired) Depth (cm):    Wound Description (Comments):    Removal Indications:    Wound Image   11/11/22 1000   Dressing Appearance Intact;Moist drainage 11/11/22 1000   Drainage Amount Scant 11/11/22 1000   Drainage Characteristics/Odor Serosanguineous 11/11/22 1000   Appearance Pink;Moist 11/11/22 1000   Tissue loss description Partial thickness 11/11/22 1000   Red (%), Wound Tissue Color 100 % 11/11/22 1000   Periwound Area Intact;Dry 11/11/22 1000   Wound Edges Open 11/11/22 1000   Wound Length (cm) 1.1 cm 11/11/22 1000   Wound Width (cm) 1.1 cm 11/11/22 1000   Wound Depth " (cm) 0 cm 11/11/22 1000   Wound Volume (cm^3) 0 cm^3 11/11/22 1000   Wound Surface Area (cm^2) 1.21 cm^2 11/11/22 1000   Care Cleansed with:;Soap and water;Sterile normal saline 11/11/22 1000   Dressing Applied;Foam;Island/border 11/11/22 1000   Periwound Care Dry periwound area maintained 11/11/22 1000   Dressing Change Due 11/14/22 11/11/22 1000            Wound 08/05/22 0930  Left distal;lateral Thigh (Active)   08/05/22 0930    Pre-existing:    Primary Wound Type:    Side: Left   Orientation: distal;lateral   Location: Thigh   Wound Number:    Ankle-Brachial Index:    Pulses:    Removal Indication and Assessment:    Wound Outcome:    (Retired) Wound Type:    (Retired) Wound Length (cm):    (Retired) Wound Width (cm):    (Retired) Depth (cm):    Wound Description (Comments):    Removal Indications:    Wound Image   11/11/22 1000   Dressing Appearance Intact;Moist drainage 11/11/22 1000   Drainage Amount Moderate 11/11/22 1000   Drainage Characteristics/Odor Serosanguineous 11/11/22 1000   Appearance Red;Moist 11/11/22 1000   Tissue loss description Full thickness 11/11/22 1000   Red (%), Wound Tissue Color 100 % 11/11/22 1000   Periwound Area Intact;Dry 11/11/22 1000   Wound Edges Open 11/11/22 1000   Wound Length (cm) 0.3 cm 11/11/22 1000   Wound Width (cm) 0.7 cm 11/11/22 1000   Wound Depth (cm) 0.5 cm 11/11/22 1000   Wound Volume (cm^3) 0.105 cm^3 11/11/22 1000   Wound Surface Area (cm^2) 0.21 cm^2 11/11/22 1000   Care Cleansed with:;Soap and water;Sterile normal saline 11/11/22 1000   Dressing Applied;Silver;Calcium alginate;Foam;Island/border 11/11/22 1000   Packing packing removed;packed with;hydrofiber/alginate 11/11/22 1000   Packing Inserted  1 11/11/22 1000   Packing Removed 1 11/11/22 1000   Periwound Care Dry periwound area maintained 11/11/22 1000   Dressing Change Due 11/14/22 11/11/22 1000            Wound 08/05/22 0949  Left distal;medial Thigh (Active)   08/05/22 0949    Pre-existing:    Primary  Wound Type:    Side: Left   Orientation: distal;medial   Location: Thigh   Wound Number:    Ankle-Brachial Index:    Pulses:    Removal Indication and Assessment:    Wound Outcome:    (Retired) Wound Type:    (Retired) Wound Length (cm):    (Retired) Wound Width (cm):    (Retired) Depth (cm):    Wound Description (Comments):    Removal Indications:    Wound Image   11/11/22 1000   Dressing Appearance Intact;Moist drainage 11/11/22 1000   Drainage Amount Moderate 11/11/22 1000   Drainage Characteristics/Odor Serosanguineous 11/11/22 1000   Appearance Red;Moist 11/11/22 1000   Tissue loss description Full thickness 11/11/22 1000   Red (%), Wound Tissue Color 100 % 11/11/22 1000   Periwound Area Dry;Intact 11/11/22 1000   Wound Edges Open 11/11/22 1000   Wound Length (cm) 0.5 cm 11/11/22 1000   Wound Width (cm) 0.7 cm 11/11/22 1000   Wound Depth (cm) 1.8 cm 11/11/22 1000   Wound Volume (cm^3) 0.63 cm^3 11/11/22 1000   Wound Surface Area (cm^2) 0.35 cm^2 11/11/22 1000   Care Cleansed with:;Soap and water;Sterile normal saline 11/11/22 1000   Dressing Applied;Silver;Calcium alginate;Foam;Island/border 11/11/22 1000   Packing packing removed;packed with;hydrofiber/alginate 11/11/22 1000   Packing Inserted  1 11/11/22 1000   Packing Removed 1 11/11/22 1000   Periwound Care Dry periwound area maintained 11/11/22 1000   Dressing Change Due 11/14/22 11/11/22 1000            Wound 08/05/22 0910 Other (comment) Left lateral Hip (Active)   08/05/22 0910    Pre-existing:    Primary Wound Type: Other   Side: Left   Orientation: lateral   Location: Hip   Wound Number:    Ankle-Brachial Index:    Pulses:    Removal Indication and Assessment:    Wound Outcome:    (Retired) Wound Type:    (Retired) Wound Length (cm):    (Retired) Wound Width (cm):    (Retired) Depth (cm):    Wound Description (Comments):    Removal Indications:    Wound Image   11/11/22 1000   Dressing Appearance Intact;Moist drainage 11/11/22 1000   Drainage Amount  Moderate 11/11/22 1000   Drainage Characteristics/Odor Serosanguineous 11/11/22 1000   Appearance Red;Moist 11/11/22 1000   Tissue loss description Full thickness 11/11/22 1000   Red (%), Wound Tissue Color 100 % 11/11/22 1000   Periwound Area Intact;Dry 11/11/22 1000   Wound Edges Open 11/11/22 1000   Wound Length (cm) 0.5 cm 11/11/22 1000   Wound Width (cm) 0.8 cm 11/11/22 1000   Wound Depth (cm) 0.8 cm 11/11/22 1000   Wound Volume (cm^3) 0.32 cm^3 11/11/22 1000   Wound Surface Area (cm^2) 0.4 cm^2 11/11/22 1000   Tunneling (depth (cm)/location) 1.0cm@12oclock 11/11/22 1000   Care Cleansed with:;Soap and water;Sterile normal saline 11/11/22 1000   Dressing Applied;Silver;Calcium alginate;Foam;Island/border 11/11/22 1000   Packing packing removed;packed with;hydrofiber/alginate 11/11/22 1000   Packing Inserted  1 11/11/22 1000   Packing Removed 1 11/11/22 1000   Periwound Care Dry periwound area maintained 11/11/22 1000   Dressing Change Due 11/14/22 11/11/22 1000         Assessment/Plan:             ICD-10-CM ICD-9-CM   1. Pressure injury of left thigh, stage 3  L89.223 707.09     707.23   2. Non-healing amputation site  T87.89 997.69   3. Incisional abscess  T81.49XA 998.59   4. S/P AKA (above knee amputation) unilateral, left  Z89.612 V49.76   5. 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  M86.452 730.15           Tissue pathology and/or culture taken:  [] Yes [x] No   Sharp debridement performed:   [] Yes [x] No   Labs ordered this visit:   [] Yes [x] No   Imaging ordered this visit:   [] Yes [x] No     Wounds improving. No signs of infection.        Orders Placed This Encounter   Procedures    Change dressing     Left AKA site +left hip wounds     Cleanse wounds with: NS   Lidocaine:prn   Silver nitrate:prn   Primary dressing: pack wounds with Aquacel AG rope   Secondary dressing: bordered foam dressing     Left elbow skin tear    Cleanse wounds with: NS   Lidocaine:prn   Silver nitrate:prn  "  Primary dressing: bordered foam dressing     Frequency: 3 x week   Follow-up: 4 weeks with Dr Conroy and ID   Home Health: [facility], please perform skilled nursing care with orders above   Other Orders:  Stop taking antibiotics at this time        Follow up in about 4 weeks (around 12/9/2022) for Dr Conroy.          11/11/2022 Nurse's note.  Follow up with Dr Conroy and Dr Conn related to left aka site wounds and left hip wound.  Patient disoriented at baseline, patient's wife at bedside.  No complaints.  New skin tear to left elbow, patient's wife unsure when patient got the injury but states "they've been putting a white bandage on it for protection since he leans on it all the time".  Overall wound improvement noted to previous wounds by MD.  Dr Conn ordered the patient stop taking antibiotics at this time.  Plan of care updated.  Orders faxed to [facility].  Follow up with Dr Conroy and Dr Conn in 4 weeks on 12/9/22.        "

## 2022-12-09 ENCOUNTER — HOSPITAL ENCOUNTER (OUTPATIENT)
Dept: WOUND CARE | Facility: HOSPITAL | Age: 72
Discharge: HOME | End: 2022-12-09
Attending: PREVENTIVE MEDICINE
Payer: MEDICARE

## 2022-12-09 ENCOUNTER — CLINICAL SUPPORT (OUTPATIENT)
Dept: INFECTIOUS DISEASES | Facility: CLINIC | Age: 72
End: 2022-12-09
Payer: MEDICARE

## 2022-12-09 VITALS
HEART RATE: 86 BPM | WEIGHT: 166 LBS | SYSTOLIC BLOOD PRESSURE: 163 MMHG | BODY MASS INDEX: 29.41 KG/M2 | HEIGHT: 63 IN | TEMPERATURE: 98 F | DIASTOLIC BLOOD PRESSURE: 79 MMHG

## 2022-12-09 DIAGNOSIS — T87.89 NON-HEALING AMPUTATION SITE: Primary | ICD-10-CM

## 2022-12-09 DIAGNOSIS — L89.229 DECUBITUS ULCER OF TROCHANTERIC REGION OF LEFT HIP, UNSPECIFIED ULCER STAGE: ICD-10-CM

## 2022-12-09 DIAGNOSIS — A49.8 INFECTION DUE TO CARBAPENEM RESISTANT PSEUDOMONAS AERUGINOSA: ICD-10-CM

## 2022-12-09 DIAGNOSIS — M86.452 CHRONIC OSTEOMYELITIS OF LEFT FEMUR WITH DRAINING SINUS: ICD-10-CM

## 2022-12-09 DIAGNOSIS — Z89.612 S/P AKA (ABOVE KNEE AMPUTATION) UNILATERAL, LEFT: ICD-10-CM

## 2022-12-09 DIAGNOSIS — E11.9 TYPE 2 DIABETES MELLITUS WITHOUT COMPLICATION, WITHOUT LONG-TERM CURRENT USE OF INSULIN: ICD-10-CM

## 2022-12-09 DIAGNOSIS — T87.89 NON-HEALING AMPUTATION SITE: ICD-10-CM

## 2022-12-09 DIAGNOSIS — A49.02 MRSA INFECTION: ICD-10-CM

## 2022-12-09 DIAGNOSIS — M86.452 CHRONIC OSTEOMYELITIS OF LEFT FEMUR WITH DRAINING SINUS: Primary | ICD-10-CM

## 2022-12-09 DIAGNOSIS — Z16.13 INFECTION DUE TO CARBAPENEM RESISTANT PSEUDOMONAS AERUGINOSA: ICD-10-CM

## 2022-12-09 DIAGNOSIS — L89.223 PRESSURE INJURY OF LEFT THIGH, STAGE 3: ICD-10-CM

## 2022-12-09 PROCEDURE — 99214 OFFICE O/P EST MOD 30 MIN: CPT

## 2022-12-09 PROCEDURE — 87070 CULTURE OTHR SPECIMN AEROBIC: CPT | Performed by: PREVENTIVE MEDICINE

## 2022-12-09 PROCEDURE — 87186 SC STD MICRODIL/AGAR DIL: CPT | Mod: 59 | Performed by: PREVENTIVE MEDICINE

## 2022-12-09 PROCEDURE — 99212 PR OFFICE/OUTPT VISIT, EST, LEVL II, 10-19 MIN: ICD-10-PCS | Mod: S$GLB,,, | Performed by: INTERNAL MEDICINE

## 2022-12-09 PROCEDURE — 87077 CULTURE AEROBIC IDENTIFY: CPT | Mod: 59 | Performed by: PREVENTIVE MEDICINE

## 2022-12-09 PROCEDURE — 99212 OFFICE O/P EST SF 10 MIN: CPT | Mod: S$GLB,,, | Performed by: INTERNAL MEDICINE

## 2022-12-09 PROCEDURE — 87147 CULTURE TYPE IMMUNOLOGIC: CPT | Performed by: PREVENTIVE MEDICINE

## 2022-12-09 PROCEDURE — 87076 CULTURE ANAEROBE IDENT EACH: CPT | Performed by: PREVENTIVE MEDICINE

## 2022-12-09 PROCEDURE — 87075 CULTR BACTERIA EXCEPT BLOOD: CPT | Mod: 59 | Performed by: PREVENTIVE MEDICINE

## 2022-12-09 NOTE — PROGRESS NOTES
[facility]    Subjective:       Patient ID: Raleigh Blair is a 72 y.o. male.    Chief Complaint: Pressure Ulcer (Left hip)    Sites unchanged. Culture of left hip and left AKA done. Patient also seen by ID. Continue POC. Orders sent to . Return in 4 weeks.  Review of Systems   All other systems reviewed and are negative.      Objective:      Physical Exam       Wound 08/05/22 0930  Left distal;lateral Thigh (Active)   08/05/22 0930    Pre-existing:    Primary Wound Type:    Side: Left   Orientation: distal;lateral   Location: Thigh   Wound Number:    Ankle-Brachial Index:    Pulses:    Removal Indication and Assessment:    Wound Outcome:    (Retired) Wound Type:    (Retired) Wound Length (cm):    (Retired) Wound Width (cm):    (Retired) Depth (cm):    Wound Description (Comments):    Removal Indications:    Wound Image   12/09/22 1030   Dressing Appearance Moist drainage 12/09/22 1030   Drainage Amount Moderate 12/09/22 1030   Drainage Characteristics/Odor Serosanguineous 12/09/22 1030   Appearance Red;Moist 12/09/22 1030   Tissue loss description Full thickness 12/09/22 1030   Red (%), Wound Tissue Color 100 % 12/09/22 1030   Periwound Area Dry 12/09/22 1030   Wound Edges Open 12/09/22 1030   Wound Length (cm) 0.5 cm 12/09/22 1030   Wound Width (cm) 0.5 cm 12/09/22 1030   Wound Depth (cm) 1 cm 12/09/22 1030   Wound Volume (cm^3) 0.25 cm^3 12/09/22 1030   Wound Surface Area (cm^2) 0.25 cm^2 12/09/22 1030   Care Cleansed with:;Sterile normal saline 12/09/22 1030   Dressing Applied;Calcium alginate;Silver 12/09/22 1030   Packing packed with 12/09/22 1030   Packing Inserted  1 12/09/22 1030   Packing Removed 1 12/09/22 1030   Periwound Care Dry periwound area maintained 12/09/22 1030   Dressing Change Due 12/12/22 12/09/22 1030            Wound 08/05/22 0949  Left distal;medial Thigh (Active)   08/05/22 0949    Pre-existing:    Primary Wound Type:    Side: Left    Orientation: distal;medial   Location: Thigh   Wound Number:    Ankle-Brachial Index:    Pulses:    Removal Indication and Assessment:    Wound Outcome:    (Retired) Wound Type:    (Retired) Wound Length (cm):    (Retired) Wound Width (cm):    (Retired) Depth (cm):    Wound Description (Comments):    Removal Indications:    Wound Image   12/09/22 1030   Dressing Appearance Moist drainage 12/09/22 1030   Drainage Amount Moderate 12/09/22 1030   Drainage Characteristics/Odor Serosanguineous 12/09/22 1030   Appearance Red;Moist 12/09/22 1030   Tissue loss description Full thickness 12/09/22 1030   Red (%), Wound Tissue Color 100 % 12/09/22 1030   Periwound Area Dry 12/09/22 1030   Wound Edges Open 12/09/22 1030   Wound Length (cm) 0.5 cm 12/09/22 1030   Wound Width (cm) 0.5 cm 12/09/22 1030   Wound Depth (cm) 2 cm 12/09/22 1030   Wound Volume (cm^3) 0.5 cm^3 12/09/22 1030   Wound Surface Area (cm^2) 0.25 cm^2 12/09/22 1030   Care Cleansed with:;Sterile normal saline 12/09/22 1030   Dressing Applied;Calcium alginate;Silver;Island/border 12/09/22 1030   Packing packed with;other (see comment) 12/09/22 1030   Packing Inserted  1 12/09/22 1030   Packing Removed 1 12/09/22 1030   Periwound Care Dry periwound area maintained 12/09/22 1030   Dressing Change Due 12/12/22 12/09/22 1030            Wound 08/05/22 0910 Other (comment) Left lateral Hip (Active)   08/05/22 0910    Pre-existing:    Primary Wound Type: Other   Side: Left   Orientation: lateral   Location: Hip   Wound Number:    Ankle-Brachial Index:    Pulses:    Removal Indication and Assessment:    Wound Outcome:    (Retired) Wound Type:    (Retired) Wound Length (cm):    (Retired) Wound Width (cm):    (Retired) Depth (cm):    Wound Description (Comments):    Removal Indications:    Wound Image   12/09/22 1030   Dressing Appearance Moist drainage 12/09/22 1030   Drainage Amount Moderate 12/09/22 1030   Drainage Characteristics/Odor Serosanguineous 12/09/22 1030    Appearance Red;Moist 12/09/22 1030   Tissue loss description Full thickness 12/09/22 1030   Red (%), Wound Tissue Color 100 % 12/09/22 1030   Periwound Area Dry 12/09/22 1030   Wound Edges Open 12/09/22 1030   Wound Length (cm) 0.7 cm 12/09/22 1030   Wound Width (cm) 0.5 cm 12/09/22 1030   Wound Depth (cm) 0.7 cm 12/09/22 1030   Wound Volume (cm^3) 0.245 cm^3 12/09/22 1030   Wound Surface Area (cm^2) 0.35 cm^2 12/09/22 1030   Tunneling (depth (cm)/location) 1.8 @ 12 12/09/22 1030   Care Cleansed with:;Sterile normal saline 12/09/22 1030   Dressing Applied;Calcium alginate;Silver;Island/border 12/09/22 1030   Packing packed with;other (see comment) 12/09/22 1030   Packing Inserted  1 12/09/22 1030   Packing Removed 1 12/09/22 1030   Periwound Care Dry periwound area maintained 12/09/22 1030   Dressing Change Due 12/12/22 12/09/22 1030         Assessment/Plan:         ICD-10-CM ICD-9-CM   1. Non-healing amputation site  T87.89 997.69   2. Decubitus ulcer of trochanteric region of left hip, unspecified ulcer stage  L89.229 707.04     707.20   3. S/P AKA (above knee amputation) unilateral, left  Z89.612 V49.76   4. 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  M86.452 730.15       Stable wounds. Slight increase in drainage/odor.    Tissue pathology and/or culture taken:  [x] Yes [] No   Sharp debridement performed:   [] Yes [x] No   Labs ordered this visit:   [] Yes [x] No   Imaging ordered this visit:   [] Yes [x] No           Orders Placed This Encounter   Procedures    Aerobic culture          Anaerobic culture          Aerobic culture          Anaerobic culture          Change dressing     Left AKA site +left hip wounds     Cleanse wounds with: NS   Lidocaine:prn   Silver nitrate:prn   Primary dressing: pack wounds with Aquacel AG rope   Secondary dressing: bordered foam dressing     Left elbow skin tear     Cleanse wounds with: NS   Lidocaine:prn   Silver nitrate:prn   Primary dressing: bordered foam  dressing     Frequency: 3 x week   Follow-up: 4 weeks with Dr Conroy and ID 1/6/23  Home Health: [facility], please perform skilled nursing care with orders above   Other Orders:  Stop taking antibiotics at this time. Culture x 2 12/9/22.        Follow up in about 4 weeks (around 1/6/2023).

## 2022-12-09 NOTE — PROGRESS NOTES
71 yo male who presents for follow up. No fevers. Wound at the end of the left leg stump re-opened after closing. No redness, no drainage. Denies any trauma, but wife does say that he sometimes hits or rubs this area with transfers.     Patient has been off antibiotics.  Exam - left femoral wounds without erythema, tenderness, or drainage. Cultures taken.     1. 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    2. S/P AKA (above knee amputation) unilateral, left    3. Pressure injury of left thigh, stage 3    4. Non-healing amputation site    5. Type 2 diabetes mellitus without complication, without long-term current use of insulin    6. 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 MRSA infection      Continue wound care. Discussed a silicone sleeve for protection. Cultures taken - no evidence of active infection on exam.  Discussed with wound care physician at the bedside - will follow cultures but hold treatment for now.

## 2022-12-12 LAB
BACTERIA SPEC AEROBE CULT: ABNORMAL
BACTERIA SPEC AEROBE CULT: ABNORMAL

## 2022-12-13 LAB
BACTERIA SPEC AEROBE CULT: ABNORMAL
BACTERIA SPEC AEROBE CULT: ABNORMAL

## 2022-12-14 ENCOUNTER — EXTERNAL HOME HEALTH (OUTPATIENT)
Dept: HOME HEALTH SERVICES | Facility: HOSPITAL | Age: 72
End: 2022-12-14
Payer: MEDICARE

## 2022-12-14 LAB
BACTERIA SPEC ANAEROBE CULT: ABNORMAL
BACTERIA SPEC ANAEROBE CULT: NORMAL

## 2023-01-06 ENCOUNTER — HOSPITAL ENCOUNTER (OUTPATIENT)
Dept: WOUND CARE | Facility: HOSPITAL | Age: 73
Discharge: HOME | End: 2023-01-06
Attending: PREVENTIVE MEDICINE
Payer: MEDICARE

## 2023-01-06 ENCOUNTER — CLINICAL SUPPORT (OUTPATIENT)
Dept: INFECTIOUS DISEASES | Facility: CLINIC | Age: 73
End: 2023-01-06
Payer: MEDICARE

## 2023-01-06 VITALS
HEIGHT: 63 IN | HEART RATE: 78 BPM | SYSTOLIC BLOOD PRESSURE: 154 MMHG | WEIGHT: 166 LBS | DIASTOLIC BLOOD PRESSURE: 67 MMHG | TEMPERATURE: 98 F | BODY MASS INDEX: 29.41 KG/M2

## 2023-01-06 DIAGNOSIS — A49.8 PROTEUS MIRABILIS INFECTION: ICD-10-CM

## 2023-01-06 DIAGNOSIS — T87.89 NON-HEALING AMPUTATION SITE: Primary | ICD-10-CM

## 2023-01-06 DIAGNOSIS — Z89.612 S/P AKA (ABOVE KNEE AMPUTATION) UNILATERAL, LEFT: ICD-10-CM

## 2023-01-06 DIAGNOSIS — A49.02 MRSA INFECTION: ICD-10-CM

## 2023-01-06 DIAGNOSIS — L89.223 PRESSURE INJURY OF LEFT THIGH, STAGE 3: ICD-10-CM

## 2023-01-06 DIAGNOSIS — L89.229 DECUBITUS ULCER OF TROCHANTERIC REGION OF LEFT HIP, UNSPECIFIED ULCER STAGE: ICD-10-CM

## 2023-01-06 DIAGNOSIS — Z16.13 INFECTION DUE TO CARBAPENEM RESISTANT PSEUDOMONAS AERUGINOSA: ICD-10-CM

## 2023-01-06 DIAGNOSIS — A49.8 INFECTION DUE TO CARBAPENEM RESISTANT PSEUDOMONAS AERUGINOSA: ICD-10-CM

## 2023-01-06 DIAGNOSIS — M86.452 CHRONIC OSTEOMYELITIS OF LEFT FEMUR WITH DRAINING SINUS: ICD-10-CM

## 2023-01-06 DIAGNOSIS — M86.452 CHRONIC OSTEOMYELITIS OF LEFT FEMUR WITH DRAINING SINUS: Primary | ICD-10-CM

## 2023-01-06 PROCEDURE — 99215 OFFICE O/P EST HI 40 MIN: CPT

## 2023-01-06 PROCEDURE — 99212 PR OFFICE/OUTPT VISIT, EST, LEVL II, 10-19 MIN: ICD-10-PCS | Mod: S$GLB,,, | Performed by: INTERNAL MEDICINE

## 2023-01-06 PROCEDURE — 99212 OFFICE O/P EST SF 10 MIN: CPT | Mod: S$GLB,,, | Performed by: INTERNAL MEDICINE

## 2023-01-06 NOTE — PROGRESS NOTES
No complaints. Wounds are improved. No fever, no drainage. Off antibiotics.     Exam - wounds have shorter tunnels - no periwound erythema, fluctuance, or drainage.     Recommendation:  Continue wound care, stay off antibiotics. Patient and wife expressed understanding and agreement.   1. 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    2. MRSA infection    3. Proteus mirabilis infection    4. 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 Pressure injury of left thigh, stage 3    6. S/P AKA (above knee amputation) unilateral, left      Follow up in 1 month.

## 2023-01-06 NOTE — PROGRESS NOTES
[facility]    Subjective:       Patient ID: Raleigh Blair is a 72 y.o. male.    Chief Complaint: Wound Care    Follow up related to left AKA sites and left posterior leg wound. No acute complaints today. Received one dose of Dalvance.  Review of Systems   All other systems reviewed and are negative.      Objective:     Vitals:    01/06/23 1042   BP: (!) 154/67   Pulse: 78   Temp: 98.1 °F (36.7 °C)         Physical Exam       Wound 08/05/22 0930  Left distal;lateral Thigh (Active)   08/05/22 0930    Pre-existing:    Primary Wound Type:    Side: Left   Orientation: distal;lateral   Location: Thigh   Wound Number:    Ankle-Brachial Index:    Pulses:    Removal Indication and Assessment:    Wound Outcome:    (Retired) Wound Type:    (Retired) Wound Length (cm):    (Retired) Wound Width (cm):    (Retired) Depth (cm):    Wound Description (Comments):    Removal Indications:    Wound Image   01/06/23 1000   Dressing Appearance Moist drainage 01/06/23 1000   Drainage Amount Moderate 01/06/23 1000   Drainage Characteristics/Odor Sanguineous 01/06/23 1000   Appearance Red;Moist 01/06/23 1000   Tissue loss description Full thickness 01/06/23 1000   Red (%), Wound Tissue Color 100 % 01/06/23 1000   Periwound Area Intact;Dry 01/06/23 1000   Wound Edges Open 01/06/23 1000   Wound Length (cm) 0.4 cm 01/06/23 1000   Wound Width (cm) 0.7 cm 01/06/23 1000   Wound Depth (cm) 0.7 cm 01/06/23 1000   Wound Volume (cm^3) 0.196 cm^3 01/06/23 1000   Wound Surface Area (cm^2) 0.28 cm^2 01/06/23 1000   Care Cleansed with:;Soap and water;Sterile normal saline 01/06/23 1000   Dressing Applied;Foam;Island/border 01/06/23 1000   Packing packed with;hydrofiber/alginate 01/06/23 1000   Packing Inserted  1 01/06/23 1000   Packing Removed 1 01/06/23 1000   Periwound Care Dry periwound area maintained 01/06/23 1000   Dressing Change Due 01/09/23 01/06/23 1000            Wound 08/05/22 0949  Left  distal;medial Thigh (Active)   08/05/22 0949    Pre-existing:    Primary Wound Type:    Side: Left   Orientation: distal;medial   Location: Thigh   Wound Number:    Ankle-Brachial Index:    Pulses:    Removal Indication and Assessment:    Wound Outcome:    (Retired) Wound Type:    (Retired) Wound Length (cm):    (Retired) Wound Width (cm):    (Retired) Depth (cm):    Wound Description (Comments):    Removal Indications:    Wound Image   01/06/23 1000   Dressing Appearance Moist drainage 01/06/23 1000   Drainage Amount Moderate 01/06/23 1000   Drainage Characteristics/Odor Sanguineous 01/06/23 1000   Appearance Red;Moist 01/06/23 1000   Tissue loss description Full thickness 01/06/23 1000   Red (%), Wound Tissue Color 100 % 01/06/23 1000   Periwound Area Intact;Dry 01/06/23 1000   Wound Edges Open 01/06/23 1000   Wound Length (cm) 0.4 cm 01/06/23 1000   Wound Width (cm) 0.6 cm 01/06/23 1000   Wound Depth (cm) 0.8 cm 01/06/23 1000   Wound Volume (cm^3) 0.192 cm^3 01/06/23 1000   Wound Surface Area (cm^2) 0.24 cm^2 01/06/23 1000   Care Cleansed with:;Soap and water;Sterile normal saline 01/06/23 1000   Dressing Applied;Foam;Island/border 01/06/23 1000   Packing packed with;hydrofiber/alginate 01/06/23 1000   Packing Inserted  1 01/06/23 1000   Packing Removed 1 01/06/23 1000   Periwound Care Dry periwound area maintained 01/06/23 1000   Dressing Change Due 01/09/23 01/06/23 1000            Wound 08/05/22 0910 Other (comment) Left lateral Hip (Active)   08/05/22 0910    Pre-existing:    Primary Wound Type: Other   Side: Left   Orientation: lateral   Location: Hip   Wound Number:    Ankle-Brachial Index:    Pulses:    Removal Indication and Assessment:    Wound Outcome:    (Retired) Wound Type:    (Retired) Wound Length (cm):    (Retired) Wound Width (cm):    (Retired) Depth (cm):    Wound Description (Comments):    Removal Indications:    Wound Image   01/06/23 1000   Dressing Appearance Intact;Moist drainage 01/06/23  1000   Drainage Amount Moderate 01/06/23 1000   Drainage Characteristics/Odor Serosanguineous;Yellow 01/06/23 1000   Appearance Red;Moist 01/06/23 1000   Tissue loss description Full thickness 01/06/23 1000   Red (%), Wound Tissue Color 100 % 01/06/23 1000   Periwound Area Intact;Dry 01/06/23 1000   Wound Edges Open 01/06/23 1000   Wound Length (cm) 0.5 cm 01/06/23 1000   Wound Width (cm) 0.5 cm 01/06/23 1000   Wound Depth (cm) 0.6 cm 01/06/23 1000   Wound Volume (cm^3) 0.15 cm^3 01/06/23 1000   Wound Surface Area (cm^2) 0.25 cm^2 01/06/23 1000   Tunneling (depth (cm)/location) 1cm@12oclock 01/06/23 1000   Care Cleansed with:;Soap and water;Sterile normal saline 01/06/23 1000   Dressing Applied;Foam;Island/border 01/06/23 1000   Packing packed with;hydrofiber/alginate 01/06/23 1000   Packing Inserted  1 01/06/23 1000   Packing Removed 1 01/06/23 1000   Periwound Care Dry periwound area maintained 01/06/23 1000   Dressing Change Due 01/09/23 01/06/23 1000         Assessment/Plan:           ICD-10-CM ICD-9-CM   1. Non-healing amputation site  T87.89 997.69   2. Decubitus ulcer of trochanteric region of left hip, unspecified ulcer stage  L89.229 707.04     707.20   3. S/P AKA (above knee amputation) unilateral, left  Z89.612 V49.76   4. 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  M86.452 730.15       Seen by ID today. No further antibiotics.  Wounds stable.    Tissue pathology and/or culture taken:  [] Yes [x] No   Sharp debridement performed:   [] Yes [x] No   Labs ordered this visit:   [] Yes [x] No   Imaging ordered this visit:   [] Yes [x] No           Orders Placed This Encounter   Procedures    Change dressing     Left AKA site +left hip wounds     Cleanse wounds with: NS   Lidocaine:prn   Silver nitrate:prn   Primary dressing: pack wounds with Aquacel AG rope   Secondary dressing: bordered foam dressing     Frequency: 3 x week   Follow-up: 6 weeks with Dr Conroy and ID 2/17/23  Home Health: [facility], please perform skilled nursing care with orders above three times weekly except when the patient is in clinic.  Thank you.  Other Orders:  Stop taking antibiotics at this time.  Apply a bordered foam to the patient's left elbow for protection.        Follow up in about 6 weeks (around 2/17/2023) for Dr Conroy.

## 2023-01-27 ENCOUNTER — EXTERNAL HOME HEALTH (OUTPATIENT)
Dept: HOME HEALTH SERVICES | Facility: HOSPITAL | Age: 73
End: 2023-01-27
Payer: MEDICARE

## 2023-02-17 ENCOUNTER — CLINICAL SUPPORT (OUTPATIENT)
Dept: INFECTIOUS DISEASES | Facility: CLINIC | Age: 73
End: 2023-02-17
Payer: MEDICARE

## 2023-02-17 ENCOUNTER — HOSPITAL ENCOUNTER (OUTPATIENT)
Dept: WOUND CARE | Facility: HOSPITAL | Age: 73
Discharge: HOME | End: 2023-02-17
Attending: PREVENTIVE MEDICINE
Payer: MEDICARE

## 2023-02-17 VITALS
BODY MASS INDEX: 29.41 KG/M2 | HEART RATE: 80 BPM | TEMPERATURE: 98 F | WEIGHT: 166 LBS | DIASTOLIC BLOOD PRESSURE: 58 MMHG | HEIGHT: 63 IN | SYSTOLIC BLOOD PRESSURE: 125 MMHG

## 2023-02-17 DIAGNOSIS — Z16.13 INFECTION DUE TO CARBAPENEM RESISTANT PSEUDOMONAS AERUGINOSA: ICD-10-CM

## 2023-02-17 DIAGNOSIS — A49.8 PROTEUS MIRABILIS INFECTION: ICD-10-CM

## 2023-02-17 DIAGNOSIS — A49.02 MRSA INFECTION: Primary | ICD-10-CM

## 2023-02-17 DIAGNOSIS — A49.8 INFECTION DUE TO CARBAPENEM RESISTANT PSEUDOMONAS AERUGINOSA: ICD-10-CM

## 2023-02-17 DIAGNOSIS — Z89.612 S/P AKA (ABOVE KNEE AMPUTATION) UNILATERAL, LEFT: ICD-10-CM

## 2023-02-17 DIAGNOSIS — T81.49XA INCISIONAL ABSCESS: ICD-10-CM

## 2023-02-17 DIAGNOSIS — T87.89 NON-HEALING AMPUTATION SITE: Primary | ICD-10-CM

## 2023-02-17 DIAGNOSIS — M86.452 CHRONIC OSTEOMYELITIS OF LEFT FEMUR WITH DRAINING SINUS: ICD-10-CM

## 2023-02-17 DIAGNOSIS — T87.89 NON-HEALING AMPUTATION SITE: ICD-10-CM

## 2023-02-17 DIAGNOSIS — L89.229 DECUBITUS ULCER OF TROCHANTERIC REGION OF LEFT HIP, UNSPECIFIED ULCER STAGE: ICD-10-CM

## 2023-02-17 PROCEDURE — 99212 OFFICE O/P EST SF 10 MIN: CPT | Mod: S$GLB,,, | Performed by: INTERNAL MEDICINE

## 2023-02-17 PROCEDURE — 99212 PR OFFICE/OUTPT VISIT, EST, LEVL II, 10-19 MIN: ICD-10-PCS | Mod: S$GLB,,, | Performed by: INTERNAL MEDICINE

## 2023-02-17 PROCEDURE — 99213 OFFICE O/P EST LOW 20 MIN: CPT

## 2023-02-17 NOTE — PROGRESS NOTES
[facility]    Subjective:       Patient ID: Raleigh Blair is a 72 y.o. male.    Chief Complaint: Wound Care    Wounds improving. No apparent signs of infection. Continuing same plan of care.  Review of Systems   All other systems reviewed and are negative.      Objective:     Vitals:    02/17/23 1041   BP: (!) 125/58   Pulse: 80   Temp: 98.1 °F (36.7 °C)         Physical Exam       Wound 08/05/22 0930  Left distal;lateral Thigh (Active)   08/05/22 0930    Pre-existing:    Primary Wound Type:    Side: Left   Orientation: distal;lateral   Location: Thigh   Wound Number:    Ankle-Brachial Index:    Pulses:    Removal Indication and Assessment:    Wound Outcome:    (Retired) Wound Type:    (Retired) Wound Length (cm):    (Retired) Wound Width (cm):    (Retired) Depth (cm):    Wound Description (Comments):    Removal Indications:    Dressing Appearance Moist drainage 02/17/23 1033   Drainage Amount Moderate 02/17/23 1033   Drainage Characteristics/Odor Serosanguineous 02/17/23 1033   Appearance Red;Moist 02/17/23 1033   Tissue loss description Full thickness 02/17/23 1033   Red (%), Wound Tissue Color 100 % 02/17/23 1033   Periwound Area Intact;Dry 02/17/23 1033   Wound Edges Open 02/17/23 1033   Wound Length (cm) 0.4 cm 02/17/23 1033   Wound Width (cm) 0.6 cm 02/17/23 1033   Wound Depth (cm) 0.6 cm 02/17/23 1033   Wound Volume (cm^3) 0.144 cm^3 02/17/23 1033   Wound Surface Area (cm^2) 0.24 cm^2 02/17/23 1033   Care Cleansed with:;Sterile normal saline 02/17/23 1033   Dressing Applied;Calcium alginate;Silver;Island/border 02/17/23 1033   Packing packed with;hydrofiber/alginate 02/17/23 1033   Packing Inserted  1 02/17/23 1033   Packing Removed 1 02/17/23 1033   Periwound Care Dry periwound area maintained 02/17/23 1033   Dressing Change Due 02/20/23 02/17/23 1033            Wound 08/05/22 0949  Left distal;medial Thigh (Active)   08/05/22 0949    Pre-existing:     Primary Wound Type:    Side: Left   Orientation: distal;medial   Location: Thigh   Wound Number:    Ankle-Brachial Index:    Pulses:    Removal Indication and Assessment:    Wound Outcome:    (Retired) Wound Type:    (Retired) Wound Length (cm):    (Retired) Wound Width (cm):    (Retired) Depth (cm):    Wound Description (Comments):    Removal Indications:    Dressing Appearance Moist drainage 02/17/23 1033   Drainage Amount Moderate 02/17/23 1033   Drainage Characteristics/Odor Serosanguineous 02/17/23 1033   Appearance Red;Moist 02/17/23 1033   Tissue loss description Full thickness 02/17/23 1033   Red (%), Wound Tissue Color 100 % 02/17/23 1033   Periwound Area Intact;Dry 02/17/23 1033   Wound Edges Open 02/17/23 1033   Wound Length (cm) 0.4 cm 02/17/23 1033   Wound Width (cm) 0.5 cm 02/17/23 1033   Wound Depth (cm) 0.6 cm 02/17/23 1033   Wound Volume (cm^3) 0.12 cm^3 02/17/23 1033   Wound Surface Area (cm^2) 0.2 cm^2 02/17/23 1033   Care Cleansed with:;Sterile normal saline 02/17/23 1033   Dressing Applied;Calcium alginate;Silver;Island/border 02/17/23 1033   Packing packed with;hydrofiber/alginate 02/17/23 1033   Packing Inserted  1 02/17/23 1033   Packing Removed 1 02/17/23 1033   Periwound Care Dry periwound area maintained 02/17/23 1033   Dressing Change Due 02/20/23 02/17/23 1033            Wound 08/05/22 0910 Other (comment) Left lateral Hip (Active)   08/05/22 0910    Pre-existing:    Primary Wound Type: Other   Side: Left   Orientation: lateral   Location: Hip   Wound Number:    Ankle-Brachial Index:    Pulses:    Removal Indication and Assessment:    Wound Outcome:    (Retired) Wound Type:    (Retired) Wound Length (cm):    (Retired) Wound Width (cm):    (Retired) Depth (cm):    Wound Description (Comments):    Removal Indications:    Dressing Appearance Intact;Moist drainage 02/17/23 1033   Drainage Amount Moderate 02/17/23 1033   Drainage Characteristics/Odor Serosanguineous 02/17/23 1031    Appearance Red;Moist 02/17/23 1033   Tissue loss description Full thickness 02/17/23 1033   Red (%), Wound Tissue Color 100 % 02/17/23 1033   Periwound Area Intact;Dry 02/17/23 1033   Wound Edges Open 02/17/23 1033   Wound Length (cm) 0.5 cm 02/17/23 1033   Wound Width (cm) 0.5 cm 02/17/23 1033   Wound Depth (cm) 0.5 cm 02/17/23 1033   Wound Volume (cm^3) 0.125 cm^3 02/17/23 1033   Wound Surface Area (cm^2) 0.25 cm^2 02/17/23 1033   Tunneling (depth (cm)/location) 0.9cm @12 02/17/23 1033   Care Cleansed with:;Sterile normal saline 02/17/23 1033   Dressing Applied;Calcium alginate;Silver;Island/border 02/17/23 1033   Packing packed with;hydrofiber/alginate 02/17/23 1033   Packing Inserted  1 02/17/23 1033   Packing Removed 1 02/17/23 1033   Periwound Care Dry periwound area maintained 02/17/23 1033   Dressing Change Due 02/20/23 02/17/23 1033         Assessment/Plan:         ICD-10-CM ICD-9-CM   1. Non-healing amputation site  T87.89 997.69   2. S/P AKA (above knee amputation) unilateral, left  Z89.612 V49.76   3. Decubitus ulcer of trochanteric region of left hip, unspecified ulcer stage  L89.229 707.04     707.20       Wounds slowly improving. No signs of infection or necrosis.      Tissue pathology and/or culture taken:  [] Yes [x] No   Sharp debridement performed:   [] Yes [x] No   Labs ordered this visit:   [] Yes [x] No   Imaging ordered this visit:   [] Yes [x] No           Orders Placed This Encounter   Procedures    Change dressing     Left AKA site +left hip wounds     Cleanse wounds with: NS   Lidocaine:prn   Silver nitrate:prn   Primary dressing: pack wounds with Aquacel AG rope   Secondary dressing: bordered foam dressing     Frequency: 3 x week   Follow-up: 6 weeks with Dr Conroy  [facility]: [facility], please perform skilled nursing care with orders above three times weekly except when the patient is in clinic.  Thank you.   Other Orders  Apply a bordered foam to the patient's left elbow for  protection.        Follow up in about 6 weeks (around 3/31/2023).

## 2023-02-17 NOTE — PROGRESS NOTES
Doing well.Off antibiotics  Wounds are smaller and shallower.   No periwound erythema, no drainage.  Discussed case with Dr. Conroy at the bedside.  Continue off antibiotics with wound care.    1. MRSA infection    2. Proteus mirabilis infection    3. 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 Incisional abscess    5. 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    6. Non-healing amputation site

## 2023-03-13 ENCOUNTER — TELEPHONE (OUTPATIENT)
Dept: WOUND CARE | Facility: HOSPITAL | Age: 73
End: 2023-03-13
Payer: MEDICARE

## 2023-03-13 DIAGNOSIS — T87.89 NON-HEALING AMPUTATION SITE: Primary | ICD-10-CM

## 2023-03-13 NOTE — TELEPHONE ENCOUNTER
Amena with [facility] called requesting decrease frequency for dressing changes  to 2 x per week by home health due to patient's insurance [employer] not wiling to pay for 3 x per week dressing changes.  Updated orders sent to [facility].

## 2023-03-17 NOTE — TELEPHONE ENCOUNTER
Problem: Depressed Mood (Adult/Pediatric)  Goal: *STG: Participates in treatment plan  Outcome: Progressing Towards Goal  EXPRESSES INTEREST IN ATTENDANCE     Goal: *STG: Demonstrates reduction in symptoms and increase in insight into coping skills/future focused  3/17/2023 0820 by Simon Otero RN  Outcome: Not Progressing Towards Goal  OUTBURST OF YELLING FOR NO KNOWN REASON  Goal: *STG: Remains safe in hospital  3/17/2023 0820 by Simon Otero RN  Outcome: Progressing Towards Goal  Pt denies any suicidal or homicidal thoughts. Contracts for safety. Remains on q 15 min safety checks.        [address] [facility] 60  Master Treatment Plan Willem Barrera        Date Treatment Plan Initiated: 3/17/23      Treatment Plan Modalities:3/17/23    Type of Modality Amount  (x minutes) Frequency (x/week) Duration (x days) Name of Responsible Staff   Community & wrap-up meetings to encourage peer interactions    15    7    1      JAYDON PACKER,   Group psychotherapy to assist in building coping skills and internal controls    60    7    1    [facility]   Therapeutic activity groups to build coping skills    60    7    1     STUART OLIVERA   Psychoeducation in group setting to address:   Medication education    15    7    1    MADAI BARKER RN   Coping skills    20    7    1    CAROL TOBAR   Relaxation techniques        STAFF   Symptom management        STAFF   Discharge planning    15    7    1    VOLODYMYR    Spirituality     60    7    1    [facility] 66    60    7    1    Volunteer from [employer]   [facility]/AA/NA    61    7    1    Volunteer from [address] [place] [address] medication management    13    7    1    Dr. Bridger East NP   Family meeting/discharge planning V/mail received from [facility], pt unable to take the Rifampin 300mg BID, every time he takes it he starts throwing up.  Requesting return call to either [facility]  or pts wife at .

## 2023-03-31 ENCOUNTER — HOSPITAL ENCOUNTER (OUTPATIENT)
Dept: WOUND CARE | Facility: HOSPITAL | Age: 73
Discharge: HOME | End: 2023-03-31
Attending: PREVENTIVE MEDICINE
Payer: MEDICARE

## 2023-03-31 VITALS
TEMPERATURE: 97 F | HEART RATE: 89 BPM | WEIGHT: 166 LBS | HEIGHT: 63 IN | BODY MASS INDEX: 29.41 KG/M2 | DIASTOLIC BLOOD PRESSURE: 67 MMHG | SYSTOLIC BLOOD PRESSURE: 138 MMHG

## 2023-03-31 DIAGNOSIS — L89.223 PRESSURE INJURY OF LEFT THIGH, STAGE 3: ICD-10-CM

## 2023-03-31 DIAGNOSIS — M86.452 CHRONIC OSTEOMYELITIS OF LEFT FEMUR WITH DRAINING SINUS: ICD-10-CM

## 2023-03-31 DIAGNOSIS — Z89.612 S/P AKA (ABOVE KNEE AMPUTATION) UNILATERAL, LEFT: ICD-10-CM

## 2023-03-31 DIAGNOSIS — T87.89 NON-HEALING AMPUTATION SITE: Primary | ICD-10-CM

## 2023-03-31 PROCEDURE — 87186 SC STD MICRODIL/AGAR DIL: CPT | Mod: 59 | Performed by: PREVENTIVE MEDICINE

## 2023-03-31 PROCEDURE — 87077 CULTURE AEROBIC IDENTIFY: CPT | Mod: 59 | Performed by: PREVENTIVE MEDICINE

## 2023-03-31 PROCEDURE — 87070 CULTURE OTHR SPECIMN AEROBIC: CPT | Performed by: PREVENTIVE MEDICINE

## 2023-03-31 PROCEDURE — 99215 OFFICE O/P EST HI 40 MIN: CPT

## 2023-03-31 PROCEDURE — 87075 CULTR BACTERIA EXCEPT BLOOD: CPT | Performed by: PREVENTIVE MEDICINE

## 2023-03-31 NOTE — PROGRESS NOTES
[facility]    Subjective:       Patient ID: Raleigh Blair is a 72 y.o. male.    Chief Complaint: Non-healing Wound Follow Up    Patient seen for non-healing wounds to left AKA and left hip. Culture of left hip wound obtained.  New orders routed to home health.  Review of Systems   All other systems reviewed and are negative.      Objective:     Vitals:    03/31/23 1018   BP: 138/67   Pulse: 89   Temp: 97.1 °F (36.2 °C)         Physical Exam       (Retired) Wound 08/05/22 0930  Left lateral Other (see comments) (Active)   08/05/22 0930    Pre-existing:    Primary Wound Type:    Side: Left   Orientation: lateral   Location: Other (see comments)   Wound Number:    Ankle-Brachial Index:    Pulses:    Removal Indication and Assessment:    Wound Outcome:    (Retired) Wound Type:    (Retired) Wound Length (cm):    (Retired) Wound Width (cm):    (Retired) Depth (cm):    Wound Description (Comments):    Removal Indications:    Wound Image   03/31/23 1050   Dressing Appearance Moist drainage 03/31/23 1050   Drainage Amount Moderate 03/31/23 1050   Drainage Characteristics/Odor Serosanguineous 03/31/23 1050   Appearance Pink;Moist 03/31/23 1050   Tissue loss description Full thickness 03/31/23 1050   Periwound Area Intact;Dry 03/31/23 1050   Wound Edges Open;Defined 03/31/23 1050   Wound Length (cm) 0.2 cm 03/31/23 1050   Wound Width (cm) 0.8 cm 03/31/23 1050   Wound Depth (cm) 1.4 cm 03/31/23 1050   Wound Volume (cm^3) 0.224 cm^3 03/31/23 1050   Wound Surface Area (cm^2) 0.16 cm^2 03/31/23 1050   Care Cleansed with:;Sterile normal saline 03/31/23 1050   Dressing Applied;Calcium alginate;Silver;Foam 03/31/23 1050   Packing packed with 03/31/23 1050   Packing Inserted  1 03/31/23 1050   Dressing Change Due 04/03/23 03/31/23 1050            (Retired) Wound 08/05/22 0949  Left medial Other (see comments) (Active)   08/05/22 0949    Pre-existing:    Primary Wound Type:    Side:  Left   Orientation: medial   Location: Other (see comments)   Wound Number:    Ankle-Brachial Index:    Pulses:    Removal Indication and Assessment:    Wound Outcome:    (Retired) Wound Type:    (Retired) Wound Length (cm):    (Retired) Wound Width (cm):    (Retired) Depth (cm):    Wound Description (Comments):    Removal Indications:    Wound Image   03/31/23 1050   Dressing Appearance Moist drainage 03/31/23 1050   Drainage Amount Small 03/31/23 1050   Drainage Characteristics/Odor Serosanguineous 03/31/23 1050   Appearance Moist;Red 03/31/23 1050   Tissue loss description Full thickness 03/31/23 1050   Red (%), Wound Tissue Color 100 % 03/31/23 1050   Periwound Area Dry;Intact 03/31/23 1050   Wound Edges Defined;Open 03/31/23 1050   Wound Length (cm) 0.2 cm 03/31/23 1050   Wound Width (cm) 0.8 cm 03/31/23 1050   Wound Depth (cm) 2 cm 03/31/23 1050   Wound Volume (cm^3) 0.32 cm^3 03/31/23 1050   Wound Surface Area (cm^2) 0.16 cm^2 03/31/23 1050   Care Cleansed with:;Sterile normal saline 03/31/23 1050   Dressing Applied;Calcium alginate;Silver;Foam 03/31/23 1050   Packing packed with 03/31/23 1050   Packing Inserted  1 03/31/23 1050   Dressing Change Due 04/03/23 03/31/23 1050            Wound 08/05/22 0910 Other (comment) Left lateral Hip (Active)   08/05/22 0910    Pre-existing:    Primary Wound Type: Other   Side: Left   Orientation: lateral   Location: Hip   Wound Number:    Ankle-Brachial Index:    Pulses:    Removal Indication and Assessment:    Wound Outcome:    (Retired) Wound Type:    (Retired) Wound Length (cm):    (Retired) Wound Width (cm):    (Retired) Depth (cm):    Wound Description (Comments):    Removal Indications:    Wound Image   03/31/23 1050   Dressing Appearance Moist drainage 03/31/23 1050   Drainage Amount Large 03/31/23 1050   Drainage Characteristics/Odor Serous;Anderson 03/31/23 1050   Appearance Moist 03/31/23 1050   Tissue loss description Full thickness 03/31/23 1050   Periwound Area  Intact;Dry 03/31/23 1050   Wound Edges Open;Defined 03/31/23 1050   Wound Length (cm) 0.3 cm 03/31/23 1050   Wound Width (cm) 0.3 cm 03/31/23 1050   Wound Depth (cm) 1.4 cm 03/31/23 1050   Wound Volume (cm^3) 0.126 cm^3 03/31/23 1050   Wound Surface Area (cm^2) 0.09 cm^2 03/31/23 1050   Care Cleansed with:;Sterile normal saline 03/31/23 1050   Dressing Applied;Calcium alginate;Silver;Foam 03/31/23 1050   Packing packed with 03/31/23 1050   Packing Inserted  1 03/31/23 1050   Packing Removed 1 03/31/23 1050   Dressing Change Due 04/03/23 03/31/23 1050         Assessment/Plan:         ICD-10-CM ICD-9-CM   1. Non-healing amputation site  T87.89 997.69   2. Pressure injury of left thigh, stage 3  L89.223 707.09     707.23   3. S/P AKA (above knee amputation) unilateral, left  Z89.612 V49.76   4. 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  M86.452 730.15       All wounds are less deep. Lateral thigh wound with increased drainage.    Tissue pathology and/or culture taken:  [x] Yes [] No   Sharp debridement performed:   [] Yes [x] No   Labs ordered this visit:   [] Yes [x] No   Imaging ordered this visit:   [] Yes [x] No           Orders Placed This Encounter   Procedures    Aerobic culture          Anaerobic culture          Change dressing     Left AKA site +left hip wounds     Cleanse wounds with: NS   Lidocaine:prn   Silver nitrate:prn   Primary dressing: pack wounds with Aquacel AG rope   Secondary dressing: bordered foam dressing     Frequency: 2 x week and PRN per home health   Follow-up: 5 weeks with Dr Conroy 5/5/23   Home Health: [facility], please perform skilled nursing care with orders as  above except when the patient is in clinic.  Thank you.     Other Orders  Apply a bordered foam to the patient's left hip/groin for protection.  Left hip culture - done 3/31/23        Follow up in about 5 weeks (around 5/5/2023) for Dr Conroy.

## 2023-04-03 LAB
BACTERIA SPEC AEROBE CULT: ABNORMAL
BACTERIA SPEC AEROBE CULT: ABNORMAL

## 2023-04-05 LAB — BACTERIA SPEC ANAEROBE CULT: NORMAL

## 2023-04-14 ENCOUNTER — EXTERNAL HOME HEALTH (OUTPATIENT)
Dept: HOME HEALTH SERVICES | Facility: HOSPITAL | Age: 73
End: 2023-04-14
Payer: MEDICARE

## 2023-05-05 ENCOUNTER — HOSPITAL ENCOUNTER (OUTPATIENT)
Dept: WOUND CARE | Facility: HOSPITAL | Age: 73
Discharge: HOME | End: 2023-05-05
Attending: PREVENTIVE MEDICINE
Payer: MEDICARE

## 2023-05-05 ENCOUNTER — CLINICAL SUPPORT (OUTPATIENT)
Dept: INFECTIOUS DISEASES | Facility: CLINIC | Age: 73
End: 2023-05-05
Payer: MEDICARE

## 2023-05-05 VITALS
DIASTOLIC BLOOD PRESSURE: 74 MMHG | WEIGHT: 166 LBS | HEART RATE: 94 BPM | HEIGHT: 63 IN | BODY MASS INDEX: 29.41 KG/M2 | TEMPERATURE: 98 F | SYSTOLIC BLOOD PRESSURE: 165 MMHG

## 2023-05-05 DIAGNOSIS — M86.452 CHRONIC OSTEOMYELITIS OF LEFT FEMUR WITH DRAINING SINUS: ICD-10-CM

## 2023-05-05 DIAGNOSIS — T81.49XA INCISIONAL ABSCESS: Primary | ICD-10-CM

## 2023-05-05 DIAGNOSIS — A49.8 INFECTION DUE TO CARBAPENEM RESISTANT PSEUDOMONAS AERUGINOSA: ICD-10-CM

## 2023-05-05 DIAGNOSIS — A49.8 PROTEUS MIRABILIS INFECTION: ICD-10-CM

## 2023-05-05 DIAGNOSIS — Z89.612 S/P AKA (ABOVE KNEE AMPUTATION) UNILATERAL, LEFT: ICD-10-CM

## 2023-05-05 DIAGNOSIS — Z16.13 INFECTION DUE TO CARBAPENEM RESISTANT PSEUDOMONAS AERUGINOSA: ICD-10-CM

## 2023-05-05 DIAGNOSIS — L89.223 PRESSURE INJURY OF LEFT THIGH, STAGE 3: ICD-10-CM

## 2023-05-05 DIAGNOSIS — A49.02 MRSA INFECTION: ICD-10-CM

## 2023-05-05 DIAGNOSIS — T87.89 NON-HEALING AMPUTATION SITE: Primary | ICD-10-CM

## 2023-05-05 PROCEDURE — 99214 OFFICE O/P EST MOD 30 MIN: CPT

## 2023-05-05 PROCEDURE — 99212 PR OFFICE/OUTPT VISIT, EST, LEVL II, 10-19 MIN: ICD-10-PCS | Mod: S$GLB,,, | Performed by: INTERNAL MEDICINE

## 2023-05-05 PROCEDURE — 99212 OFFICE O/P EST SF 10 MIN: CPT | Mod: S$GLB,,, | Performed by: INTERNAL MEDICINE

## 2023-05-05 NOTE — PROGRESS NOTES
Not taking any antibiotics.  No fevers. Wounds healing slowly - no new drainage.     Culture from 3/31 growing few Klebsiella and Proteus.     Exam Left AKA site - wounds healing slowly - no drainage. No erythema.                 1. Incisional abscess    2. 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 MRSA infection    4. 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 Proteus mirabilis infection    6. S/P AKA (above knee amputation) unilateral, left      Plan - continue wound care. Stay off antibiotics for now. I spent 10 minutes on this encounter today.

## 2023-05-05 NOTE — PROGRESS NOTES
[facility]    Subjective:       Patient ID: Raleigh Blair is a 72 y.o. male.    Chief Complaint: Non-healing Wound Follow Up    5/5/23 Patient seen for non-healing left amputation sites and left hip wounds.  No s/sx infection presented.  New orders routed to home health.  Patient was also seen by Dr Eastman.  Review of Systems   All other systems reviewed and are negative.      Objective:     Vitals:    05/05/23 1020   BP: (!) 165/74   Pulse: 94   Temp: 97.9 °F (36.6 °C)         Physical Exam       (Retired) Wound 08/05/22 0930  Left lateral Other (see comments) (Active)   08/05/22 0930    Pre-existing:    Primary Wound Type:    Side: Left   Orientation: lateral   Location: Other (see comments)   Wound Number:    Ankle-Brachial Index:    Pulses:    Removal Indication and Assessment:    Wound Outcome:    (Retired) Wound Type:    (Retired) Wound Length (cm):    (Retired) Wound Width (cm):    (Retired) Depth (cm):    Wound Description (Comments):    Removal Indications:    Wound Image   05/05/23 1028   Dressing Appearance Moist drainage 05/05/23 1028   Drainage Amount Scant 05/05/23 1028   Drainage Characteristics/Odor Serosanguineous 05/05/23 1028   Appearance Pink;Moist 05/05/23 1028   Tissue loss description Full thickness 05/05/23 1028   Red (%), Wound Tissue Color 100 % 05/05/23 1028   Periwound Area Intact;Dry 05/05/23 1028   Wound Edges Defined;Open 05/05/23 1028   Wound Length (cm) 0.2 cm 05/05/23 1028   Wound Width (cm) 0.7 cm 05/05/23 1028   Wound Depth (cm) 0.7 cm 05/05/23 1028   Wound Volume (cm^3) 0.098 cm^3 05/05/23 1028   Wound Surface Area (cm^2) 0.14 cm^2 05/05/23 1028   Care Cleansed with:;Sterile normal saline 05/05/23 1028   Dressing Applied;Calcium alginate;Foam 05/05/23 1028   Packing packed with 05/05/23 1028   Packing Inserted  1 05/05/23 1028   Dressing Change Due 05/09/23 05/05/23 1028            (Retired) Wound 08/05/22 0949  Left medial  Other (see comments) (Active)   08/05/22 0949    Pre-existing:    Primary Wound Type:    Side: Left   Orientation: medial   Location: Other (see comments)   Wound Number:    Ankle-Brachial Index:    Pulses:    Removal Indication and Assessment:    Wound Outcome:    (Retired) Wound Type:    (Retired) Wound Length (cm):    (Retired) Wound Width (cm):    (Retired) Depth (cm):    Wound Description (Comments):    Removal Indications:    Wound Image   05/05/23 1028   Dressing Appearance Dried drainage 05/05/23 1028   Drainage Amount Small 05/05/23 1028   Drainage Characteristics/Odor Serosanguineous 05/05/23 1028   Appearance Pink;Moist 05/05/23 1028   Tissue loss description Full thickness 05/05/23 1028   Red (%), Wound Tissue Color 100 % 05/05/23 1028   Periwound Area Intact;Dry 05/05/23 1028   Wound Edges Defined;Rolled/closed 05/05/23 1028   Wound Length (cm) 0.2 cm 05/05/23 1028   Wound Width (cm) 0.7 cm 05/05/23 1028   Wound Depth (cm) 1.2 cm 05/05/23 1028   Wound Volume (cm^3) 0.168 cm^3 05/05/23 1028   Wound Surface Area (cm^2) 0.14 cm^2 05/05/23 1028   Care Cleansed with:;Sterile normal saline 05/05/23 1028   Dressing Applied;Calcium alginate;Foam 05/05/23 1028   Packing packed with;hydrofiber/alginate 05/05/23 1028   Packing Inserted  1 05/05/23 1028   Dressing Change Due 05/09/23 05/05/23 1028            Wound 08/05/22 0910 Other (comment) Left lateral Hip (Active)   08/05/22 0910    Pre-existing:    Primary Wound Type: Other   Side: Left   Orientation: lateral   Location: Hip   Wound Number:    Ankle-Brachial Index:    Pulses:    Removal Indication and Assessment:    Wound Outcome:    (Retired) Wound Type:    (Retired) Wound Length (cm):    (Retired) Wound Width (cm):    (Retired) Depth (cm):    Wound Description (Comments):    Removal Indications:    Wound Image   05/05/23 1028   Dressing Appearance Moist drainage 05/05/23 1028   Drainage Amount Moderate 05/05/23 1028   Drainage Characteristics/Odor  Serous;Anderson 05/05/23 1028   Appearance Moist 05/05/23 1028   Tissue loss description Full thickness 05/05/23 1028   Periwound Area Intact;Dry 05/05/23 1028   Wound Edges Open;Rolled/closed 05/05/23 1028   Wound Length (cm) 0.2 cm 05/05/23 1028   Wound Width (cm) 0.2 cm 05/05/23 1028   Wound Depth (cm) 1.8 cm 05/05/23 1028   Wound Volume (cm^3) 0.072 cm^3 05/05/23 1028   Wound Surface Area (cm^2) 0.04 cm^2 05/05/23 1028   Care Cleansed with:;Sterile normal saline 05/05/23 1028   Dressing Applied;Calcium alginate;Foam 05/05/23 1028   Packing packed with;hydrofiber/alginate 05/05/23 1028   Packing Inserted  1 05/05/23 1028   Periwound Care Dry periwound area maintained 05/05/23 1028   Dressing Change Due 05/09/23 05/05/23 1028         Assessment/Plan:         ICD-10-CM ICD-9-CM   1. Non-healing amputation site  T87.89 997.69   2. Pressure injury of left thigh, stage 3  L89.223 707.09     707.23   3. S/P AKA (above knee amputation) unilateral, left  Z89.612 V49.76   4. 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 No signs of infection or necrosis.      Tissue pathology and/or culture taken:  [] Yes [x] No   Sharp debridement performed:   [] Yes [x] No   Labs ordered this visit:   [] Yes [x] No   Imaging ordered this visit:   [] Yes [x] No           Orders Placed This Encounter   Procedures    Change dressing     Left AKA site +left hip wounds     Cleanse wounds with: NS   Lidocaine:prn   Silver nitrate:prn   Primary dressing: pack wounds with Aquacel AG rope   Secondary dressing: bordered foam dressing     Frequency: 2 x week and PRN per home health   Follow-up: 6 weeks with Dr Conroy   Critical access hospital: [facility], please perform skilled nursing care with orders as above except when the patient is in clinic.      Other Orders  Apply a bordered foam to the patient's left hip/groin for protection.        Follow up in about 6 weeks (around 6/16/2023) for   Conroy.

## 2023-06-30 ENCOUNTER — HOSPITAL ENCOUNTER (OUTPATIENT)
Dept: WOUND CARE | Facility: HOSPITAL | Age: 73
Discharge: HOME | End: 2023-06-30
Attending: PREVENTIVE MEDICINE
Payer: MEDICARE

## 2023-06-30 VITALS
RESPIRATION RATE: 18 BRPM | HEART RATE: 83 BPM | DIASTOLIC BLOOD PRESSURE: 83 MMHG | WEIGHT: 166 LBS | BODY MASS INDEX: 29.41 KG/M2 | TEMPERATURE: 98 F | HEIGHT: 63 IN | SYSTOLIC BLOOD PRESSURE: 124 MMHG

## 2023-06-30 DIAGNOSIS — L89.223 PRESSURE INJURY OF LEFT THIGH, STAGE 3: ICD-10-CM

## 2023-06-30 DIAGNOSIS — Z89.612 S/P AKA (ABOVE KNEE AMPUTATION) UNILATERAL, LEFT: ICD-10-CM

## 2023-06-30 DIAGNOSIS — M86.452 CHRONIC OSTEOMYELITIS OF LEFT FEMUR WITH DRAINING SINUS: ICD-10-CM

## 2023-06-30 DIAGNOSIS — T87.89 NON-HEALING AMPUTATION SITE: Primary | ICD-10-CM

## 2023-06-30 PROCEDURE — 99214 OFFICE O/P EST MOD 30 MIN: CPT

## 2023-06-30 NOTE — PROGRESS NOTES
[facility]    Subjective:       Patient ID: Raleigh Blair is a 72 y.o. male.    Chief Complaint: Non-healing Wound Follow Up    Wound care follow up for left AKA and left hip ulcers. Per wife no drainage from left hip - area with pin hole opening. Pinpoint opening noted to lateral aspect of AKA, no drainage, medial opening smaller and unable to be packed. Patient met with ID during visit. Continue plan of care, return to clinic in 8 weeks.  Review of Systems   All other systems reviewed and are negative.      Objective:     Vitals:    06/30/23 0948   BP: 124/83   Pulse: 83   Resp: 18   Temp: 97.5 °F (36.4 °C)         Physical Exam       (Retired) Wound 08/05/22 0930  Left lateral Other (see comments) (Active)   08/05/22 0930    Pre-existing:    Primary Wound Type:    Side: Left   Orientation: lateral   Location: Other (see comments)   Wound Number:    Ankle-Brachial Index:    Pulses:    Removal Indication and Assessment:    Wound Outcome:    (Retired) Wound Type:    (Retired) Wound Length (cm):    (Retired) Wound Width (cm):    (Retired) Depth (cm):    Wound Description (Comments):    Removal Indications:    Wound Image   06/30/23 1022   Dressing Appearance Dry 06/30/23 1022   Drainage Amount None 06/30/23 1022   Periwound Area Intact 06/30/23 1022   Wound Edges Defined 06/30/23 1022   Wound Length (cm) 0.1 cm 06/30/23 1022   Wound Width (cm) 0.1 cm 06/30/23 1022   Wound Depth (cm) 0.1 cm 06/30/23 1022   Wound Volume (cm^3) 0.001 cm^3 06/30/23 1022   Wound Surface Area (cm^2) 0.01 cm^2 06/30/23 1022   Care Cleansed with:;Sterile normal saline 06/30/23 1022   Dressing Applied;Silver;Calcium alginate;Island/border 06/30/23 1022   Dressing Change Due 07/04/23 06/30/23 1022            (Retired) Wound 08/05/22 0949  Left medial Other (see comments) (Active)   08/05/22 0949    Pre-existing:    Primary Wound Type:    Side: Left   Orientation: medial   Location: Other (see  comments)   Wound Number:    Ankle-Brachial Index:    Pulses:    Removal Indication and Assessment:    Wound Outcome:    (Retired) Wound Type:    (Retired) Wound Length (cm):    (Retired) Wound Width (cm):    (Retired) Depth (cm):    Wound Description (Comments):    Removal Indications:    Wound Image   06/30/23 1022   Dressing Appearance Moist drainage 06/30/23 1022   Drainage Amount Small 06/30/23 1022   Drainage Characteristics/Odor Serosanguineous 06/30/23 1022   Appearance Red 06/30/23 1022   Tissue loss description Full thickness 06/30/23 1022   Red (%), Wound Tissue Color 100 % 06/30/23 1022   Periwound Area Intact 06/30/23 1022   Wound Edges Open 06/30/23 1022   Wound Length (cm) 0.2 cm 06/30/23 1022   Wound Width (cm) 0.5 cm 06/30/23 1022   Wound Depth (cm) 0.7 cm 06/30/23 1022   Wound Volume (cm^3) 0.07 cm^3 06/30/23 1022   Wound Surface Area (cm^2) 0.1 cm^2 06/30/23 1022   Care Cleansed with:;Sterile normal saline 06/30/23 1022   Dressing Applied;Silver;Calcium alginate;Island/border 06/30/23 1022   Dressing Change Due 07/04/23 06/30/23 1022            Wound 08/05/22 0910 Other (comment) Left lateral Hip (Active)   08/05/22 0910    Pre-existing:    Primary Wound Type: Other   Side: Left   Orientation: lateral   Location: Hip   Wound Number:    Ankle-Brachial Index:    Pulses:    Removal Indication and Assessment:    Wound Outcome:    (Retired) Wound Type:    (Retired) Wound Length (cm):    (Retired) Wound Width (cm):    (Retired) Depth (cm):    Wound Description (Comments):    Removal Indications:    Wound Image   06/30/23 1022   Dressing Appearance Dry 06/30/23 1022   Drainage Amount None 06/30/23 1022   Periwound Area Intact 06/30/23 1022   Wound Length (cm) 0.1 cm 06/30/23 1022   Wound Width (cm) 0.1 cm 06/30/23 1022   Wound Depth (cm) 0.1 cm 06/30/23 1022   Wound Volume (cm^3) 0.001 cm^3 06/30/23 1022   Wound Surface Area (cm^2) 0.01 cm^2 06/30/23 1022   Care Cleansed with:;Sterile normal saline  06/30/23 1022   Dressing Applied;Island/border 06/30/23 1022   Dressing Change Due 07/04/23 06/30/23 1022         Assessment/Plan:         ICD-10-CM ICD-9-CM   1. Non-healing amputation site  T87.89 997.69   2. S/P AKA (above knee amputation) unilateral, left  Z89.612 V49.76   3. Pressure injury of left thigh, stage 3  L89.223 707.09     707.23   4. 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  M86.452 730.15           Tissue pathology and/or culture taken:  [] Yes [x] No   Sharp debridement performed:   [] Yes [x] No   Labs ordered this visit:   [] Yes [x] No   Imaging ordered this visit:   [] Yes [x] No           Orders Placed This Encounter   Procedures    Change dressing     Left AKA site +left hip wounds     Cleanse wounds with: NS   Lidocaine:prn   Silver nitrate:prn   Primary dressing:  Aquacel AG   Secondary dressing: bordered foam dressing     Frequency: 2 x week and PRN per home health   Follow-up: 8 weeks with Dr Conroy  and ID  Home Health: [facility], [facility], please perform skilled nursing care with orders as above except when the patient is in clinic.       Other Orders  Apply a bordered foam to the patient's left hip/groin for protection.        Follow up in about 8 weeks (around 8/25/2023) for  and ID.

## 2023-07-12 ENCOUNTER — EXTERNAL HOME HEALTH (OUTPATIENT)
Dept: HOME HEALTH SERVICES | Facility: HOSPITAL | Age: 73
End: 2023-07-12
Payer: MEDICARE

## 2025-01-09 NOTE — ASSESSMENT & PLAN NOTE
-Present on admission, repeat pending  -Procal negative  -Afebrile and WBC WNL  -Blood cx from [facility] pending    Resolved   n/a